# Patient Record
Sex: FEMALE | Race: WHITE | NOT HISPANIC OR LATINO | Employment: OTHER | URBAN - NONMETROPOLITAN AREA
[De-identification: names, ages, dates, MRNs, and addresses within clinical notes are randomized per-mention and may not be internally consistent; named-entity substitution may affect disease eponyms.]

---

## 2017-01-03 ENCOUNTER — OFFICE VISIT (OUTPATIENT)
Dept: UROLOGY | Facility: CLINIC | Age: 57
End: 2017-01-03

## 2017-01-03 ENCOUNTER — OUTSIDE FACILITY SERVICE (OUTPATIENT)
Dept: UROLOGY | Facility: CLINIC | Age: 57
End: 2017-01-03

## 2017-01-03 VITALS
WEIGHT: 250 LBS | HEIGHT: 70 IN | HEART RATE: 77 BPM | DIASTOLIC BLOOD PRESSURE: 73 MMHG | BODY MASS INDEX: 35.79 KG/M2 | SYSTOLIC BLOOD PRESSURE: 145 MMHG | RESPIRATION RATE: 16 BRPM | OXYGEN SATURATION: 97 %

## 2017-01-03 DIAGNOSIS — N20.1 URETERAL CALCULI: Primary | ICD-10-CM

## 2017-01-03 PROCEDURE — S0260 H&P FOR SURGERY: HCPCS | Performed by: UROLOGY

## 2017-01-03 PROCEDURE — 99204 OFFICE O/P NEW MOD 45 MIN: CPT | Performed by: UROLOGY

## 2017-01-03 NOTE — PROGRESS NOTES
Chief Complaint  Nephrolithiasis (8mm left ureteral stone.)      HPI  Katelyn Wilson is a 56 y.o.female who is referred by the emergency room for evaluation of an 8 mm calculus in her distal left ureter. She's been having intermittent pain for a week and was previously seen at an emergency room in [place].  She has now formed 3 stones since she started taking Topamax. This the first one that she has not been able to pass spontaneously.  In the office today she is vomiting and is in uncontrollable pain so I suggested admitting her for ureteroscopy and laser lithotripsy in the a.m.    Vitals:    01/03/17 1511   BP: 145/73   Pulse: 77   Resp: 16   SpO2: 97%       Past Medical History  Past Medical History   Diagnosis Date   • Ataxia    • Calculus of distal right ureter    • Chest pain      [facility] feb 2003-near normal coronaries, Cardiolite 2007-Exercised for 7 minutes and 22 seconds-no evidence of ischemia, LVEF 84%, ER presentation on 3/17/16 Dr. 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    • Hyperlipidemia    • Hypertension    • Muscle weakness (generalized)    • Neck pain    • Obesity    • Osteoarthritis    • Stroke      MRI of brain 2001 revealing Microvascular changes and evidence of 3mm, left basal fanglia lacunar infarct with initiation of Plavix   • Syncope      neg Tilt Table 11/09/2011   • Tendonitis    • Ureteral stone        Past Surgical History  Past Surgical History   Procedure Laterality Date   • Appendectomy     • Cholecystectomy     • Hysterectomy     • Rotator cuff repair Left        Medications  has a current medication list which includes the following prescription(s): albuterol, amitriptyline, aspirin, clopidogrel,  cyanocobalamin, ezetimibe, gabapentin, isosorbide mononitrate, meloxicam, metoprolol tartrate, montelukast, nitroglycerin, salsalate, and topiramate.    Allergies  Allergies   Allergen Reactions   • Atorvastatin    • Codeine Other (See Comments)     Tachycardia   • Diltiazem Other (See Comments)     Headache   • Estrogens    • Oxycodone    • Propoxyphene    • Diovan [Valsartan] Rash       Social History  Social History     Social History   • Marital status:      Spouse name: N/A   • Number of children: N/A   • Years of education: N/A     Occupational History   • Not on file.     Social History Main Topics   • Smoking status: Former Smoker     Types: Cigarettes     Quit date: 1992   • Smokeless tobacco: Never Used   • Alcohol use No   • Drug use: No   • Sexual activity: Defer     Other Topics Concern   • Not on file     Social History Narrative       Family History  Family History   Problem Relation Age of Onset   • Colon cancer Mother    • Diabetes Mother    • Hypertension Mother    • Colon cancer Father    • Hypertension Father    • Prostate cancer Father    • Diabetes Maternal Grandmother    • Hypertension Maternal Grandmother    • Diabetes Maternal Grandfather    • Prostate cancer Maternal Grandfather    • Hypertension Paternal Grandmother    • Lung cancer Paternal Grandfather    • Prostate cancer Paternal Grandfather        Review of Systems  Review of Systems  Positive for fatigue and weight gain itching chronic headaches-nosebleeds diarrhea hematuria kidney stones back pain joint pain dizziness depression easy bruising  Physical Exam  Physical Exam   Constitutional: She is oriented to person, place, and time. She appears well-developed and well-nourished.   HENT:   Head: Normocephalic and atraumatic.   Neck: Normal range of motion.   Cardiovascular: Normal rate, regular rhythm, normal heart sounds and intact distal pulses.    Pulmonary/Chest: Effort normal. No respiratory distress. She has no wheezes.    Abdominal: Soft. She exhibits no distension and no mass. There is no tenderness. No hernia.   Musculoskeletal: Normal range of motion.   Lymphadenopathy:     She has no cervical adenopathy.   Neurological: She is alert and oriented to person, place, and time.   Skin: Skin is warm and dry.   Psychiatric: She has a normal mood and affect. Her behavior is normal.   Vitals reviewed.      Labs recent and today in the office:  Results for orders placed or performed during the hospital encounter of 01/03/17   Urinalysis With / Culture If Indicated   Result Value Ref Range    Color, UA Yellow     Appearance, UA Slightly Cloudy     Glucose, UA Negative NEGATIVE    Bilirubin, UA Negative NEGATIVE    Ketones, UA Negative NEGATIVE    Specific Gravity, UA 1.020 1.003 - 1.035    Blood, UA Moderate NEGATIVE    pH, UA 7.0 5.0 - 8.0    Protein, UA Negative NEGATIVE    Urobilinogen, UA 0.2 0.2    Nitrite, UA Negative NEGATIVE    Leukocytes, UA Negative NEGATIVE    WBC, UA 0-3 0 - 3    RBC, UA 10-20 0 - 3    Epithelial Cells, UA 4-10 0 - 3    Bacteria, UA TRACE NONE SEEN    Amorphous Crystals, UA TRACE    Basic Metabolic Panel   Result Value Ref Range    Sodium 141 137 - 145 mmol/L    Potassium 4.0 3.5 - 5.1 mmol/L    Chloride 100 98 - 107 mmol/L    CO2 30 26 - 30 mmol/L    Anion Gap 16 10 - 20 mmol/L    BUN 19 7 - 20 mg/dL    Creatinine 1.1 0.6 - 1.3 mg/dL    eGFR 51 mL/min    Glucose 227 (H) 74 - 98 mg/dL    Calcium 9.3 8.4 - 10.2 mg/dL   CBC & Differential   Result Value Ref Range    WBC 6.8 4.8 - 10.8 THOUS    RBC 4.89 4.20 - 5.40 m/uL    Hemoglobin 14.9 12.0 - 16.0 g/dL    Hematocrit 43 37 - 47 %    MCV 88.5 81.0 - 99.0 fL    MCH 30.5 27.0 - 31.0 uug    MCHC 34.4 30.0 - 37.0 g/dL    RDW 12.2 11.5 - 14.5 %    Platelets 173 130 - 400 THOUS    Neutrophil Rel % 74.8 37.0 - 80.0 %    Lymphocyte Rel % 13.7 10.0 - 50.0 %    Monocyte Rel % 8.0 0.0 - 12.0 %    Eosinophil Rel % 2.5 0.0 - 7.0 %    Basophil Rel % 0.70 0.00 - 2.50 %     Immature Granulocyte Rel % 0.30 0.00 - 2.50 %    Neutrophils Absolute 5.08 2.00 - 6.90 THOUS    Lymphocytes Absolute 0.93 0.60 - 3.40 THOUS    Monocytes Absolute 0.54 0.00 - 0.90 THOUS    Eosinophils Absolute 0.17 0.00 - 0.70 THOUS    Basophils Absolute 0.05 0.00 - 0.20 THOUS    Abs Imm Gran 0.02 0.00 - 0.60 THOUS         Assessment & Plan  She'll be admitted for parenteral hydration pain control and in the a.m. Undergo ureteroscopy laser lithotripsy and insertion of ureteral stent  Please see the separately dictated history and physical for additional detail.

## 2017-01-03 NOTE — MR AVS SNAPSHOT
Katelynjonelle Wilson   1/3/2017 3:15 PM   Office Visit    Dept Phone:  [phone]   Encounter #:  [id number]    Provider:  Jalil Mac MD   Department:  [facility] UROLOGY                Your Full Care Plan              Your Updated Medication List          This list is accurate as of: 1/3/17  4:01 PM.  Always use your most recent med list.                amitriptyline 10 MG tablet   Commonly known as:  ELAVIL       aspirin 325 MG tablet       cyanocobalamin 1000 MCG/ML injection       gabapentin 600 MG tablet   Commonly known as:  NEURONTIN       isosorbide mononitrate 60 MG 24 hr tablet   Commonly known as:  IMDUR       meloxicam 7.5 MG tablet   Commonly known as:  MOBIC       metoprolol tartrate 25 MG tablet   Commonly known as:  LOPRESSOR       nitroglycerin 0.4 MG SL tablet   Commonly known as:  NITROSTAT       PLAVIX 75 MG tablet   Generic drug:  clopidogrel       PROAIR  (90 BASE) MCG/ACT inhaler   Generic drug:  albuterol       salsalate 500 MG tablet   Commonly known as:  DISALCID       SINGULAIR 10 MG tablet   Generic drug:  montelukast       TOPAMAX 50 MG tablet   Generic drug:  topiramate       ZETIA 10 MG tablet   Generic drug:  ezetimibe               Instructions     None    Patient Instructions History      Upcoming Appointments     Visit Type Date Time Department    NEW PATIENT 1/3/2017  3:15 PM [facility]Y [place]    FOLLOW UP 12/21/2017 10:00 AM [facility] NE[facility]O [facility] [place]      MyChart Signup     Our records indicate that you have declined [facility] sign[facility]. If you would like to sign up for [facility], please email [email or web address] or call [phone] to obtain an activation code.             Other Info from Your Visit           Your Appointments     Dec 21, 2017 10:00 AM EST   Follow Up with Edvin Andrade MD   [facility] NEUROLOGY (--)    [address]  [facility] [address]    "  [phone]           Arrive 15 minutes prior to appointment.              Allergies     Atorvastatin      Codeine  Other (See Comments)    Tachycardia    Diltiazem  Other (See Comments)    Headache    Estrogens      Oxycodone      Propoxyphene      Diovan [Valsartan]  Rash      Reason for Visit     Nephrolithiasis 8mm left ureteral stone.      Vital Signs     Blood Pressure Pulse Respirations Height Weight Oxygen Saturation    145/73 77 16 69.5\" (176.5 cm) 250 lb (113 kg) 97%    Body Mass Index Smoking Status                36.39 kg/m2 Former Smoker            "

## 2017-01-04 ENCOUNTER — OUTSIDE FACILITY SERVICE (OUTPATIENT)
Dept: UROLOGY | Facility: CLINIC | Age: 57
End: 2017-01-04

## 2017-01-04 PROCEDURE — 74420 UROGRAPHY RTRGR +-KUB: CPT | Performed by: UROLOGY

## 2017-01-04 PROCEDURE — 52356 CYSTO/URETERO W/LITHOTRIPSY: CPT | Performed by: UROLOGY

## 2017-01-09 ENCOUNTER — OFFICE VISIT (OUTPATIENT)
Dept: UROLOGY | Facility: CLINIC | Age: 57
End: 2017-01-09

## 2017-01-09 VITALS
TEMPERATURE: 98.5 F | DIASTOLIC BLOOD PRESSURE: 76 MMHG | HEART RATE: 85 BPM | OXYGEN SATURATION: 97 % | RESPIRATION RATE: 18 BRPM | SYSTOLIC BLOOD PRESSURE: 144 MMHG

## 2017-01-09 DIAGNOSIS — Z87.898 NO POST-OP COMPLICATIONS: Primary | ICD-10-CM

## 2017-01-09 LAB
BILIRUB BLD-MCNC: NEGATIVE MG/DL
CLARITY, POC: CLEAR
COLOR UR: YELLOW
GLUCOSE UR STRIP-MCNC: NEGATIVE MG/DL
KETONES UR QL: NEGATIVE
LEUKOCYTE EST, POC: NEGATIVE
NITRITE UR-MCNC: NEGATIVE MG/ML
PH UR: 6 [PH] (ref 5–8)
PROT UR STRIP-MCNC: ABNORMAL MG/DL
RBC # UR STRIP: ABNORMAL /UL
SP GR UR: 1.03 (ref 1–1.03)
UROBILINOGEN UR QL: NORMAL

## 2017-01-09 PROCEDURE — 81003 URINALYSIS AUTO W/O SCOPE: CPT | Performed by: UROLOGY

## 2017-01-09 PROCEDURE — 99213 OFFICE O/P EST LOW 20 MIN: CPT | Performed by: UROLOGY

## 2017-01-09 NOTE — PROGRESS NOTES
Chief Complaint  Post-op (patient is here for post op stent removal.)      HPI  Katelyn Wilson is a 56 y.o.female who returns today after laser lithotripsy of her force ureteral calculus that she is formed since taking Topamax.  Then is easily removed with the protruding urethral suture.    Vitals:    01/09/17 1315   BP: 144/76   Pulse: 85   Resp: 18   Temp: 98.5 °F (36.9 °C)   SpO2: 97%       Past Medical History  Past Medical History   Diagnosis Date   • Ataxia    • Calculus of distal right ureter    • Chest pain      [facility] feb 2003-near normal coronaries, Cardiolite 2007-Exercised for 7 minutes and 22 seconds-no evidence of ischemia, LVEF 84%, ER presentation on 3/17/16 Dr. 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    • Hyperlipidemia    • Hypertension    • Muscle weakness (generalized)    • Neck pain    • Obesity    • Osteoarthritis    • Stroke      MRI of brain 2001 revealing Microvascular changes and evidence of 3mm, left basal fanglia lacunar infarct with initiation of Plavix   • Syncope      neg Tilt Table 11/09/2011   • Tendonitis    • Ureteral stone        Past Surgical History  Past Surgical History   Procedure Laterality Date   • Appendectomy     • Cholecystectomy     • Hysterectomy     • Rotator cuff repair Left        Medications  has a current medication list which includes the following prescription(s): albuterol, amitriptyline, aspirin, clopidogrel, cyanocobalamin, ezetimibe, gabapentin, isosorbide mononitrate, meloxicam, metoprolol tartrate, montelukast, nitroglycerin, salsalate, and topiramate.    Allergies  Allergies   Allergen Reactions   • Atorvastatin    • Codeine Other (See Comments)     Tachycardia   • Diltiazem Other  (See Comments)     Headache   • Estrogens    • Oxycodone    • Propoxyphene    • Diovan [Valsartan] Rash       Social History  Social History     Social History   • Marital status:      Spouse name: N/A   • Number of children: N/A   • Years of education: N/A     Occupational History   • Not on file.     Social History Main Topics   • Smoking status: Former Smoker     Types: Cigarettes     Quit date: 1992   • Smokeless tobacco: Never Used   • Alcohol use No   • Drug use: No   • Sexual activity: Defer     Other Topics Concern   • Not on file     Social History Narrative       Family History  Family History   Problem Relation Age of Onset   • Colon cancer Mother    • Diabetes Mother    • Hypertension Mother    • Colon cancer Father    • Hypertension Father    • Prostate cancer Father    • Diabetes Maternal Grandmother    • Hypertension Maternal Grandmother    • Diabetes Maternal Grandfather    • Prostate cancer Maternal Grandfather    • Hypertension Paternal Grandmother    • Lung cancer Paternal Grandfather    • Prostate cancer Paternal Grandfather        Review of Systems  Review of Systems   Constitutional: Negative.    Genitourinary: Negative.    All other systems reviewed and are negative.      Physical Exam  Physical Exam    Labs recent and today in the office:  Results for orders placed or performed in visit on 01/09/17   POC Urinalysis Dipstick, Automated   Result Value Ref Range    Color Yellow Yellow, Straw, Dark Yellow, Marissa    Clarity, UA Clear Clear    Glucose, UA Negative Negative, 1000 mg/dL (3+) mg/dL    Bilirubin Negative Negative    Ketones, UA Negative Negative    Specific Gravity  1.030 1.005 - 1.030    Blood, UA 3+ (A) Negative    pH, Urine 6.0 5.0 - 8.0    Protein, POC 2+ (A) Negative mg/dL    Urobilinogen, UA Normal Normal    Leukocytes Negative Negative    Nitrite, UA Negative Negative         Assessment & Plan  She is informed of the dietary changes she needs to make to reduce her risk of  additional kidney stones and informed about the possibility of a metabolic formation.  She will check back with her neurologist to see if there is an alternative to the Topamax for migraines.

## 2017-01-09 NOTE — MR AVS SNAPSHOT
Katelyn Wilson   1/9/2017 1:00 PM   Office Visit    Dept Phone:  [phone]   Encounter #:  [id number]    Provider:  Jalil Mac MD   Department:  [facility] UROLOGY                Your Full Care Plan              Your Updated Medication List          This list is accurate as of: 1/9/17  1:34 PM.  Always use your most recent med list.                amitriptyline 10 MG tablet   Commonly known as:  ELAVIL       aspirin 325 MG tablet       cyanocobalamin 1000 MCG/ML injection       gabapentin 600 MG tablet   Commonly known as:  NEURONTIN       isosorbide mononitrate 60 MG 24 hr tablet   Commonly known as:  IMDUR       meloxicam 7.5 MG tablet   Commonly known as:  MOBIC       metoprolol tartrate 25 MG tablet   Commonly known as:  LOPRESSOR       nitroglycerin 0.4 MG SL tablet   Commonly known as:  NITROSTAT       PLAVIX 75 MG tablet   Generic drug:  clopidogrel       PROAIR  (90 BASE) MCG/ACT inhaler   Generic drug:  albuterol       salsalate 500 MG tablet   Commonly known as:  DISALCID       SINGULAIR 10 MG tablet   Generic drug:  montelukast       TOPAMAX 50 MG tablet   Generic drug:  topiramate       ZETIA 10 MG tablet   Generic drug:  ezetimibe               We Performed the Following     POC Urinalysis Dipstick, Automated       You Were Diagnosed With        Codes Comments    No post-op complications    -  Primary ICD-10-CM: Z87.898  ICD-9-CM: V49.9       Instructions     None    Patient Instructions History      Upcoming Appointments     Visit Type Date Time Department    FOLLOW UP 1/9/2017  1:00 PM [facility] UROLOGY [place]    FOLLOW UP 2/22/2017  3:15 PM [facility] UROLOGY [place]    FOLLOW UP 12/21/2017 10:00 AM [facility] NEURO CONSULTS [facility]      WISE[employer] Signup     Our records indicate that you have declined [facility] signup. If you would like to sign up for [employer], please email [employer] [email or web address] or call [phone] to obtain an activation  code.             Other Info from Your Visit           Your Appointments     Feb 22, 2017  3:15 PM EST   Follow Up with Jalil Mac MD   [facility] UROLOGY (--)    [address]  [facility] [id number] [phone]           Arrive 15 minutes prior to appointment.            Dec 21, 2017 10:00 AM EST   Follow Up with Edvin Andrade MD   [facility] NEUROLOGY (--)    [address]  [facility] [address] [phone]           Arrive 15 minutes prior to appointment.              Allergies     Atorvastatin      Codeine  Other (See Comments)    Tachycardia    Diltiazem  Other (See Comments)    Headache    Estrogens      Oxycodone      Propoxyphene      Diovan [Valsartan]  Rash      Reason for Visit     Post-op patient is here for post op stent removal.      Vital Signs     Blood Pressure Pulse Temperature Respirations Oxygen Saturation Smoking Status    144/76 85 98.5 °F (36.9 °C) (Temporal Artery ) 18 97% Former Smoker      Problems and Diagnoses Noted     No post-op complications    -  Primary      Results     POC Urinalysis Dipstick, Automated      Component Value Standard Range & Units    Color Yellow Yellow, Straw, Dark Yellow, Marissa    Clarity, UA Clear Clear    Glucose, UA Negative Negative, 1000 mg/dL (3+) mg/dL    Bilirubin Negative Negative    Ketones, UA Negative Negative    Specific Gravity  1.030 1.005 - 1.030    Blood, UA 3+ Negative    pH, Urine 6.0 5.0 - 8.0    Protein, POC 2+ Negative mg/dL    Urobilinogen, UA Normal Normal    Leukocytes Negative Negative    Nitrite, UA Negative Negative

## 2017-03-29 ENCOUNTER — OFFICE VISIT (OUTPATIENT)
Dept: UROLOGY | Facility: CLINIC | Age: 57
End: 2017-03-29

## 2017-03-29 VITALS — WEIGHT: 250 LBS | HEIGHT: 70 IN | BODY MASS INDEX: 35.79 KG/M2

## 2017-03-29 DIAGNOSIS — Z87.442 PERSONAL HISTORY OF KIDNEY STONES: Primary | ICD-10-CM

## 2017-03-29 LAB
BILIRUB BLD-MCNC: NEGATIVE MG/DL
CLARITY, POC: CLEAR
COLOR UR: YELLOW
GLUCOSE UR STRIP-MCNC: NEGATIVE MG/DL
KETONES UR QL: NEGATIVE
LEUKOCYTE EST, POC: NEGATIVE
NITRITE UR-MCNC: NEGATIVE MG/ML
PH UR: 6.5 [PH] (ref 5–8)
PROT UR STRIP-MCNC: ABNORMAL MG/DL
RBC # UR STRIP: NEGATIVE /UL
SP GR UR: 1.01 (ref 1–1.03)
UROBILINOGEN UR QL: NORMAL

## 2017-03-29 PROCEDURE — 99213 OFFICE O/P EST LOW 20 MIN: CPT | Performed by: UROLOGY

## 2017-03-29 PROCEDURE — 81003 URINALYSIS AUTO W/O SCOPE: CPT | Performed by: UROLOGY

## 2017-03-29 NOTE — PROGRESS NOTES
Chief Complaint  Nephrolithiasis (6 week fup.)      KAMILA Wilson is a 56 y.o.female who returns today for follow-up 6 weeks after she required ureteroscopy laser lithotripsy for a large ureteral calculus.  She had never had stones until she was started on Topamax.  She has an appointment to see her neurologist next week to consider alternative medications.  Currently she is asymptomatic with no signs of stones in her urine is clear negative even for blood.    There were no vitals filed for this visit.    Past Medical History  Past Medical History:   Diagnosis Date   • Ataxia    • Calculus of distal right ureter    • Chest pain     [facility] feb 2003-near normal coronaries, Cardiolite 2007-Exercised for 7 minutes and 22 seconds-no evidence of ischemia, LVEF 84%, ER presentation on 3/17/16 Dr. 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    • Hyperlipidemia    • Hypertension    • Muscle weakness (generalized)    • Neck pain    • Obesity    • Osteoarthritis    • Stroke     MRI of brain 2001 revealing Microvascular changes and evidence of 3mm, left basal fanglia lacunar infarct with initiation of Plavix   • Syncope     neg Tilt Table 11/09/2011   • Tendonitis    • Ureteral stone        Past Surgical History  Past Surgical History:   Procedure Laterality Date   • APPENDECTOMY     • CHOLECYSTECTOMY     • HYSTERECTOMY     • ROTATOR CUFF REPAIR Left        Medications  has a current medication list which includes the following prescription(s): albuterol, amitriptyline, aspirin, clopidogrel, cyanocobalamin, ezetimibe, gabapentin, isosorbide mononitrate, meloxicam, metoprolol tartrate, montelukast, nitroglycerin, salsalate, and  topiramate.    Allergies  Allergies   Allergen Reactions   • Atorvastatin    • Codeine Other (See Comments)     Tachycardia   • Diltiazem Other (See Comments)     Headache   • Estrogens    • Oxycodone    • Propoxyphene    • Diovan [Valsartan] Rash       Social History  Social History     Social History   • Marital status:      Spouse name: N/A   • Number of children: N/A   • Years of education: N/A     Occupational History   • Not on file.     Social History Main Topics   • Smoking status: Former Smoker     Types: Cigarettes     Quit date: 1992   • Smokeless tobacco: Never Used   • Alcohol use No   • Drug use: No   • Sexual activity: Defer     Other Topics Concern   • Not on file     Social History Narrative       Family History  Family History   Problem Relation Age of Onset   • Colon cancer Mother    • Diabetes Mother    • Hypertension Mother    • Colon cancer Father    • Hypertension Father    • Prostate cancer Father    • Diabetes Maternal Grandmother    • Hypertension Maternal Grandmother    • Diabetes Maternal Grandfather    • Prostate cancer Maternal Grandfather    • Hypertension Paternal Grandmother    • Lung cancer Paternal Grandfather    • Prostate cancer Paternal Grandfather        Review of Systems  Review of Systems   Constitutional: Negative.    Genitourinary: Negative.    All other systems reviewed and are negative.      Physical Exam  Physical Exam    Labs recent and today in the office:  Results for orders placed or performed in visit on 03/29/17   POC Urinalysis Dipstick, Automated   Result Value Ref Range    Color Yellow Yellow, Straw, Dark Yellow, Marissa    Clarity, UA Clear Clear    Glucose, UA Negative Negative, 1000 mg/dL (3+) mg/dL    Bilirubin Negative Negative    Ketones, UA Negative Negative    Specific Gravity  1.015 1.005 - 1.030    Blood, UA Negative Negative    pH, Urine 6.5 5.0 - 8.0    Protein, POC Trace (A) Negative mg/dL    Urobilinogen, UA Normal Normal    Leukocytes  Negative Negative    Nitrite, UA Negative Negative         Assessment & Plan  I suggested a heart healthy diet with plenty of water to reduce her risk of getting kidney stones.  If she has additional formation I would recommend a metabolic evaluation.

## 2017-05-04 ENCOUNTER — OFFICE VISIT (OUTPATIENT)
Dept: NEUROLOGY | Facility: CLINIC | Age: 57
End: 2017-05-04

## 2017-05-04 VITALS
WEIGHT: 255 LBS | DIASTOLIC BLOOD PRESSURE: 78 MMHG | HEIGHT: 70 IN | BODY MASS INDEX: 36.51 KG/M2 | OXYGEN SATURATION: 97 % | SYSTOLIC BLOOD PRESSURE: 110 MMHG | HEART RATE: 81 BPM

## 2017-05-04 DIAGNOSIS — G56.03 BILATERAL CARPAL TUNNEL SYNDROME: Primary | ICD-10-CM

## 2017-05-04 DIAGNOSIS — M47.812 SPONDYLOSIS OF CERVICAL REGION WITHOUT MYELOPATHY OR RADICULOPATHY: ICD-10-CM

## 2017-05-04 DIAGNOSIS — G43.009 MIGRAINE WITHOUT AURA AND WITHOUT STATUS MIGRAINOSUS, NOT INTRACTABLE: ICD-10-CM

## 2017-05-04 DIAGNOSIS — M54.2 NECK PAIN: ICD-10-CM

## 2017-05-04 PROCEDURE — 99214 OFFICE O/P EST MOD 30 MIN: CPT | Performed by: PSYCHIATRY & NEUROLOGY

## 2017-05-04 RX ORDER — AMITRIPTYLINE HYDROCHLORIDE 50 MG/1
50 TABLET, FILM COATED ORAL NIGHTLY
Qty: 30 TABLET | Refills: 11 | Status: SHIPPED | OUTPATIENT
Start: 2017-05-04 | End: 2018-09-26

## 2017-08-25 ENCOUNTER — TRANSCRIBE ORDERS (OUTPATIENT)
Dept: ADMINISTRATIVE | Facility: HOSPITAL | Age: 57
End: 2017-08-25

## 2017-08-25 DIAGNOSIS — R51.9 HEADACHE, UNSPECIFIED HEADACHE TYPE: Primary | ICD-10-CM

## 2017-09-01 ENCOUNTER — TRANSCRIBE ORDERS (OUTPATIENT)
Dept: CT IMAGING | Facility: HOSPITAL | Age: 57
End: 2017-09-01

## 2017-09-01 ENCOUNTER — HOSPITAL ENCOUNTER (OUTPATIENT)
Dept: CT IMAGING | Facility: HOSPITAL | Age: 57
Discharge: HOME | End: 2017-09-01
Admitting: NURSE PRACTITIONER

## 2017-09-01 DIAGNOSIS — R51.9 HEADACHE, UNSPECIFIED HEADACHE TYPE: ICD-10-CM

## 2017-09-01 PROCEDURE — 70450 CT HEAD/BRAIN W/O DYE: CPT

## 2017-09-09 ENCOUNTER — APPOINTMENT (OUTPATIENT)
Dept: GENERAL RADIOLOGY | Facility: HOSPITAL | Age: 57
End: 2017-09-09

## 2017-09-09 ENCOUNTER — HOSPITAL ENCOUNTER (EMERGENCY)
Facility: HOSPITAL | Age: 57
Discharge: HOME | End: 2017-09-09
Attending: STUDENT IN AN ORGANIZED HEALTH CARE EDUCATION/TRAINING PROGRAM | Admitting: EMERGENCY MEDICINE

## 2017-09-09 ENCOUNTER — APPOINTMENT (OUTPATIENT)
Dept: CT IMAGING | Facility: HOSPITAL | Age: 57
End: 2017-09-09

## 2017-09-09 VITALS
BODY MASS INDEX: 35.79 KG/M2 | TEMPERATURE: 97.9 F | HEART RATE: 83 BPM | DIASTOLIC BLOOD PRESSURE: 88 MMHG | HEIGHT: 70 IN | OXYGEN SATURATION: 95 % | WEIGHT: 250 LBS | SYSTOLIC BLOOD PRESSURE: 120 MMHG | RESPIRATION RATE: 20 BRPM

## 2017-09-09 DIAGNOSIS — S80.01XA CONTUSION OF RIGHT KNEE, INITIAL ENCOUNTER: Primary | ICD-10-CM

## 2017-09-09 DIAGNOSIS — S40.012A CONTUSION OF LEFT SHOULDER, INITIAL ENCOUNTER: ICD-10-CM

## 2017-09-09 PROCEDURE — 73030 X-RAY EXAM OF SHOULDER: CPT

## 2017-09-09 PROCEDURE — 99283 EMERGENCY DEPT VISIT LOW MDM: CPT

## 2017-09-09 PROCEDURE — 70450 CT HEAD/BRAIN W/O DYE: CPT

## 2017-09-09 PROCEDURE — 73562 X-RAY EXAM OF KNEE 3: CPT

## 2017-09-09 RX ORDER — HYDROCODONE BITARTRATE AND ACETAMINOPHEN 5; 325 MG/1; MG/1
1 TABLET ORAL EVERY 6 HOURS PRN
Qty: 10 TABLET | Refills: 0 | Status: SHIPPED | OUTPATIENT
Start: 2017-09-09 | End: 2018-09-26

## 2017-09-09 RX ORDER — HYDROCODONE BITARTRATE AND ACETAMINOPHEN 5; 325 MG/1; MG/1
1 TABLET ORAL ONCE
Status: COMPLETED | OUTPATIENT
Start: 2017-09-09 | End: 2017-09-09

## 2017-09-09 RX ORDER — MOMETASONE FUROATE 50 UG/1
2 SPRAY, METERED NASAL DAILY
COMMUNITY
End: 2018-11-09

## 2017-09-09 RX ORDER — ESOMEPRAZOLE MAGNESIUM 40 MG/1
40 CAPSULE, DELAYED RELEASE ORAL
COMMUNITY
End: 2018-11-09

## 2017-09-09 RX ORDER — DULOXETIN HYDROCHLORIDE 60 MG/1
60 CAPSULE, DELAYED RELEASE ORAL DAILY
COMMUNITY

## 2017-09-09 RX ORDER — LISINOPRIL 10 MG/1
10 TABLET ORAL DAILY
COMMUNITY

## 2017-09-09 RX ORDER — DESLORATADINE 5 MG/1
5 TABLET ORAL DAILY
COMMUNITY

## 2017-09-09 RX ORDER — AMITRIPTYLINE HYDROCHLORIDE 10 MG/1
10 TABLET, FILM COATED ORAL 3 TIMES DAILY
COMMUNITY
End: 2018-09-26

## 2017-09-09 RX ADMIN — HYDROCODONE BITARTRATE AND ACETAMINOPHEN 1 TABLET: 5; 325 TABLET ORAL at 23:14

## 2017-09-10 NOTE — DISCHARGE INSTRUCTIONS
Patient should use warm compresses, ibuprofen.  Patient will be given small amount of Norco for the next 48 hours.  Patient should follow with primary care return for worsening symptoms.

## 2017-09-10 NOTE — ED PROVIDER NOTES
"Subjective   HPI Comments: Patient is 57-year-old female who is here today complaining of pain of the right knee.  Patient states that she fell down some stairs approximately one week prior and has had slowly worsening pain she describes as \"killing her\" of her right knee.  Patient states pain is significant that ambulation is difficult.  Stabbing shooting.  Patient also complains of continued left shoulder pain worse with movement, posterior scalp pain worse with light touch.  Patient was seen by her primary care doctor this past Wednesday.  She denied any loss of consciousness and was not seen at the hospital.  Patient states she has had previous history of injury to the right knee      History provided by:  Patient      Review of Systems   Musculoskeletal:        Right knee pain, left shoulder pain   Skin:        Ecchymosis left shoulder   All other systems reviewed and are negative.      Past Medical History:   Diagnosis Date   • Ataxia    • Calculus of distal right ureter    • Chest pain     [facility] feb 2003-near normal coronaries, Cardiolite 2007-Exercised for 7 minutes and 22 seconds-no evidence of ischemia, LVEF 84%, ER presentation on 3/17/16 Dr. 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    • Hyperlipidemia    • Hypertension    • Muscle weakness (generalized)    • Neck pain    • Obesity    • Osteoarthritis    • Stroke     MRI of brain 2001 revealing Microvascular changes and evidence of 3mm, left basal fanglia lacunar infarct with initiation of Plavix   • Syncope     neg Tilt Table 11/09/2011   • Tendonitis    • Ureteral stone        Allergies   Allergen Reactions   • Atorvastatin    • Codeine Other (See Comments)     Tachycardia   • " Diltiazem Other (See Comments)     Headache   • Estrogens    • Oxycodone    • Propoxyphene    • Diovan [Valsartan] Rash       Past Surgical History:   Procedure Laterality Date   • APPENDECTOMY     • CHOLECYSTECTOMY     • HYSTERECTOMY     • ROTATOR CUFF REPAIR Left        Family History   Problem Relation Age of Onset   • Colon cancer Mother    • Diabetes Mother    • Hypertension Mother    • Colon cancer Father    • Hypertension Father    • Prostate cancer Father    • Diabetes Maternal Grandmother    • Hypertension Maternal Grandmother    • Diabetes Maternal Grandfather    • Prostate cancer Maternal Grandfather    • Hypertension Paternal Grandmother    • Lung cancer Paternal Grandfather    • Prostate cancer Paternal Grandfather        Social History     Social History   • Marital status:      Spouse name: N/A   • Number of children: N/A   • Years of education: N/A     Social History Main Topics   • Smoking status: Former Smoker     Types: Cigarettes     Quit date: 1992   • Smokeless tobacco: Never Used   • Alcohol use No   • Drug use: No   • Sexual activity: Defer     Other Topics Concern   • None     Social History Narrative           Objective   Physical Exam   Constitutional: She is oriented to person, place, and time. She appears well-developed and well-nourished.   HENT:   Head: Normocephalic.   There is tenderness in the left posterior ear scalp, there is no ecchymosis, abrasions or redness.  There is mild soft tissue swelling in the area of tenderness.   Eyes: EOM are normal. Pupils are equal, round, and reactive to light.   Neck: Normal range of motion.   Cardiovascular: Normal rate and regular rhythm.    Pulmonary/Chest: Effort normal.   Musculoskeletal: Normal range of motion.   There is tenderness to palpation of the shoulder, right knee   Neurological: She is alert and oriented to person, place, and time.   Skin: Skin is warm and dry.   Ecchymosis of the left posterior shoulder, ecchymosis of the  right knee   Psychiatric: She has a normal mood and affect. Her behavior is normal. Thought content normal.       Procedures         ED Course  ED Course        X-rays were negative for abnormality, CT head was normal-I was specifically looking for small undiagnosed skull fracture.  I will give patient a small amount of Norco for her pain over the next 48 hours.  Patient should follow with primary care.  Possible referral to orthopedics for continued right knee pain.          MDM    Final diagnoses:   Contusion of right knee, initial encounter   Contusion of left shoulder, initial encounter            Jenelle Ye PA-C  09/09/17 [id number]

## 2017-09-13 ENCOUNTER — OFFICE VISIT (OUTPATIENT)
Dept: NEUROLOGY | Facility: CLINIC | Age: 57
End: 2017-09-13

## 2017-09-13 VITALS
HEIGHT: 69 IN | DIASTOLIC BLOOD PRESSURE: 97 MMHG | SYSTOLIC BLOOD PRESSURE: 137 MMHG | HEART RATE: 92 BPM | WEIGHT: 255 LBS | OXYGEN SATURATION: 97 % | BODY MASS INDEX: 37.77 KG/M2

## 2017-09-13 DIAGNOSIS — G43.009 MIGRAINE WITHOUT AURA AND WITHOUT STATUS MIGRAINOSUS, NOT INTRACTABLE: ICD-10-CM

## 2017-09-13 DIAGNOSIS — S09.90XA HEAD INJURY, INITIAL ENCOUNTER: ICD-10-CM

## 2017-09-13 DIAGNOSIS — G56.03 BILATERAL CARPAL TUNNEL SYNDROME: ICD-10-CM

## 2017-09-13 DIAGNOSIS — R42 DIZZINESS: Primary | ICD-10-CM

## 2017-09-13 DIAGNOSIS — M54.2 NECK PAIN: ICD-10-CM

## 2017-09-13 PROCEDURE — 99214 OFFICE O/P EST MOD 30 MIN: CPT | Performed by: PSYCHIATRY & NEUROLOGY

## 2017-10-06 ENCOUNTER — TELEPHONE (OUTPATIENT)
Dept: CARDIOLOGY | Facility: CLINIC | Age: 57
End: 2017-10-06

## 2017-10-06 NOTE — TELEPHONE ENCOUNTER
PT scheduled for right knee replacement in January or march with Dr. Deirdre Fontaine - she had a cath in 3/2016- near normal coronaries- she has a ascending aorta that measures 3.4x3.8 anis due to repeat CT whens he comes back in January 2018-  Last CT was March 2016    Ok to proceed? Should we repeat the CT first? She has no complaints at this time-

## 2018-04-12 ENCOUNTER — OFFICE VISIT (OUTPATIENT)
Dept: NEUROLOGY | Facility: CLINIC | Age: 58
End: 2018-04-12

## 2018-04-12 VITALS
BODY MASS INDEX: 34.66 KG/M2 | DIASTOLIC BLOOD PRESSURE: 85 MMHG | WEIGHT: 234 LBS | SYSTOLIC BLOOD PRESSURE: 119 MMHG | HEART RATE: 81 BPM | HEIGHT: 69 IN

## 2018-04-12 DIAGNOSIS — G56.03 BILATERAL CARPAL TUNNEL SYNDROME: ICD-10-CM

## 2018-04-12 DIAGNOSIS — R26.89 POOR BALANCE: ICD-10-CM

## 2018-04-12 DIAGNOSIS — G43.009 MIGRAINE WITHOUT AURA AND WITHOUT STATUS MIGRAINOSUS, NOT INTRACTABLE: Primary | ICD-10-CM

## 2018-04-12 DIAGNOSIS — M54.2 NECK PAIN: ICD-10-CM

## 2018-04-12 PROCEDURE — 99214 OFFICE O/P EST MOD 30 MIN: CPT | Performed by: PSYCHIATRY & NEUROLOGY

## 2018-04-12 RX ORDER — ONDANSETRON 8 MG/1
TABLET, ORALLY DISINTEGRATING ORAL
Refills: 0 | COMMUNITY
Start: 2018-03-14 | End: 2018-11-09

## 2018-04-12 RX ORDER — FEXOFENADINE HYDROCHLORIDE 180 MG/1
TABLET, FILM COATED ORAL
Refills: 0 | COMMUNITY
Start: 2018-02-09 | End: 2018-11-09

## 2018-04-12 RX ORDER — CYCLOBENZAPRINE HCL 10 MG
TABLET ORAL
Refills: 0 | COMMUNITY
Start: 2018-03-28 | End: 2018-09-26

## 2018-04-12 NOTE — PROGRESS NOTES
Subjective:     Patient ID: Katelyn Wilson is a 57 y.o. female.    CC:   Chief Complaint   Patient presents with   • Carpal Tunnel       HPI:   History of Present Illness  The following portions of the patient's history were reviewed and updated as appropriate: allergies, current medications, past family history, past medical history, past social history, past surgical history and problem list.     Reports increase in headaches, neck pain, wrist pain, poor balance. She has had another fall with possible head and neck injury. She has benefited from neck injections and wrist injections in the past.    Past Medical History:   Diagnosis Date   • Ataxia    • Calculus of distal right ureter    • Chest pain     [facility] feb 2003-near normal coronaries, Cardiolite 2007-Exercised for 7 minutes and 22 seconds-no evidence of ischemia, LVEF 84%, ER presentation on 3/17/16 Dr. 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    • Hyperlipidemia    • Hypertension    • Muscle weakness (generalized)    • Neck pain    • Obesity    • Osteoarthritis    • Stroke     MRI of brain 2001 revealing Microvascular changes and evidence of 3mm, left basal fanglia lacunar infarct with initiation of Plavix   • Syncope     neg Tilt Table 11/09/2011   • Tendonitis    • Ureteral stone        Past Surgical History:   Procedure Laterality Date   • APPENDECTOMY     • CHOLECYSTECTOMY     • HYSTERECTOMY     • ROTATOR CUFF REPAIR Left        Social History     Social History   • Marital status:      Spouse name: N/A   • Number of children: N/A   • Years of education: N/A     Occupational History   • Not on file.     Social History Main Topics   • Smoking status: Former Smoker      Types: Cigarettes     Quit date: 1992   • Smokeless tobacco: Never Used   • Alcohol use No   • Drug use: No   • Sexual activity: Defer     Other Topics Concern   • Not on file     Social History Narrative   • No narrative on file       Family History   Problem Relation Age of Onset   • Colon cancer Mother    • Diabetes Mother    • Hypertension Mother    • Colon cancer Father    • Hypertension Father    • Prostate cancer Father    • Diabetes Maternal Grandmother    • Hypertension Maternal Grandmother    • Diabetes Maternal Grandfather    • Prostate cancer Maternal Grandfather    • Hypertension Paternal Grandmother    • Lung cancer Paternal Grandfather    • Prostate cancer Paternal Grandfather         Review of Systems   Constitutional: Positive for appetite change and fatigue. Negative for chills, fever and unexpected weight change.   HENT: Positive for hearing loss and nosebleeds. Negative for ear pain, rhinorrhea and sore throat.    Eyes: Positive for visual disturbance. Negative for photophobia, pain, discharge and itching.   Respiratory: Positive for shortness of breath. Negative for cough, chest tightness and wheezing.    Cardiovascular: Negative for chest pain, palpitations and leg swelling.   Gastrointestinal: Positive for nausea. Negative for abdominal pain, blood in stool, constipation, diarrhea and vomiting.   Genitourinary: Negative for dysuria, frequency, hematuria and urgency.   Musculoskeletal: Positive for back pain, neck pain and neck stiffness. Negative for arthralgias, gait problem, joint swelling and myalgias.   Skin: Negative for rash and wound.   Allergic/Immunologic: Negative for environmental allergies and food allergies.   Neurological: Positive for dizziness, light-headedness and headaches. Negative for tremors, seizures, syncope, speech difficulty, weakness and numbness.   Hematological: Negative for adenopathy. Bruises/bleeds easily.   Psychiatric/Behavioral: Positive for dysphoric mood.  Negative for agitation, confusion, decreased concentration, hallucinations, sleep disturbance and suicidal ideas. The patient is not nervous/anxious.         Objective:    Neurologic Exam     Mental Status   Oriented to person, place, and time.       Physical Exam   Constitutional: She is oriented to person, place, and time. She appears well-developed and well-nourished.   Cardiovascular: Normal rate and regular rhythm.    Pulmonary/Chest: Effort normal.   Musculoskeletal:   Decreased ROM cspine   Neurological: She is alert and oriented to person, place, and time. She has normal reflexes.   Gait somewhat wide based, cautious   Psychiatric: She has a normal mood and affect. Her behavior is normal. Thought content normal.       Assessment/Plan:       Katelyn was seen today for carpal tunnel.    Diagnoses and all orders for this visit:    Migraine without aura and without status migrainosus, not intractable  -     MRI Brain Without Contrast    Neck pain  -     Ambulatory Referral to Physical Therapy  -     MRI Cervical Spine Without Contrast    Bilateral carpal tunnel syndrome    Poor balance  -     Ambulatory Referral to Physical Therapy  -     MRI Brain Without Contrast  -     MRI Cervical Spine Without Contrast         MRI of brain and cspine are requested upon consideration of brain base mass and cervical spinal stenosis.    Edvin Andrade MD  4/12/2018

## 2018-04-16 ENCOUNTER — TELEPHONE (OUTPATIENT)
Dept: NEUROLOGY | Facility: CLINIC | Age: 58
End: 2018-04-16

## 2018-04-16 NOTE — TELEPHONE ENCOUNTER
----- Message from Rolanda Maldonado sent at 4/13/2018  3:05 PM EDT -----  Regarding: DR. SARA GRACIA [facility] CONTACTED PATIENT ABOUT THERAPY, AND SHE DECLINED, AND IS GOING TO A FACILITY NEARER TO HER.

## 2018-05-10 ENCOUNTER — TELEPHONE (OUTPATIENT)
Dept: NEUROLOGY | Facility: CLINIC | Age: 58
End: 2018-05-10

## 2018-05-10 NOTE — TELEPHONE ENCOUNTER
----- Message from Kareen Walsh sent at 5/9/2018 11:20 AM EDT -----  Regarding: PAPERWORK   Contact: [phone]  PT CALLED ABOUT PAPERWORK,WANTING TO KNOW IF IT WAS COMPLETED...PLEASE CALL HER BACK

## 2018-05-24 ENCOUNTER — TELEPHONE (OUTPATIENT)
Dept: NEUROLOGY | Facility: CLINIC | Age: 58
End: 2018-05-24

## 2018-09-14 ENCOUNTER — APPOINTMENT (OUTPATIENT)
Dept: CT IMAGING | Facility: HOSPITAL | Age: 58
End: 2018-09-14

## 2018-09-14 ENCOUNTER — APPOINTMENT (OUTPATIENT)
Dept: GENERAL RADIOLOGY | Facility: HOSPITAL | Age: 58
End: 2018-09-14

## 2018-09-14 ENCOUNTER — HOSPITAL ENCOUNTER (EMERGENCY)
Facility: HOSPITAL | Age: 58
Discharge: HOME | End: 2018-09-15
Attending: EMERGENCY MEDICINE | Admitting: EMERGENCY MEDICINE

## 2018-09-14 DIAGNOSIS — R55 NEAR SYNCOPE: Primary | ICD-10-CM

## 2018-09-14 DIAGNOSIS — Z87.898 HISTORY OF SYNCOPE: ICD-10-CM

## 2018-09-14 DIAGNOSIS — Z86.73 HISTORY OF STROKE: ICD-10-CM

## 2018-09-14 LAB
ALBUMIN SERPL-MCNC: 4.68 G/DL (ref 3.2–4.8)
ALBUMIN/GLOB SERPL: 2.3 G/DL (ref 1.5–2.5)
ALP SERPL-CCNC: 79 U/L (ref 25–100)
ALT SERPL W P-5'-P-CCNC: 53 U/L (ref 7–40)
ANION GAP SERPL CALCULATED.3IONS-SCNC: 10 MMOL/L (ref 3–11)
AST SERPL-CCNC: 54 U/L (ref 0–33)
BASOPHILS # BLD AUTO: 0.04 10*3/MM3 (ref 0–0.2)
BASOPHILS NFR BLD AUTO: 0.6 % (ref 0–1)
BILIRUB SERPL-MCNC: 0.5 MG/DL (ref 0.3–1.2)
BNP SERPL-MCNC: <2 PG/ML (ref 0–100)
BUN BLD-MCNC: 10 MG/DL (ref 9–23)
BUN/CREAT SERPL: 13 (ref 7–25)
CALCIUM SPEC-SCNC: 9.6 MG/DL (ref 8.7–10.4)
CHLORIDE SERPL-SCNC: 99 MMOL/L (ref 99–109)
CO2 SERPL-SCNC: 28 MMOL/L (ref 20–31)
CREAT BLD-MCNC: 0.77 MG/DL (ref 0.6–1.3)
DEPRECATED RDW RBC AUTO: 43.6 FL (ref 37–54)
EOSINOPHIL # BLD AUTO: 0.18 10*3/MM3 (ref 0–0.3)
EOSINOPHIL NFR BLD AUTO: 2.7 % (ref 0–3)
ERYTHROCYTE [DISTWIDTH] IN BLOOD BY AUTOMATED COUNT: 13.2 % (ref 11.3–14.5)
GFR SERPL CREATININE-BSD FRML MDRD: 77 ML/MIN/1.73
GLOBULIN UR ELPH-MCNC: 2 GM/DL
GLUCOSE BLD-MCNC: 150 MG/DL (ref 70–100)
HCT VFR BLD AUTO: 48 % (ref 34.5–44)
HGB BLD-MCNC: 15.8 G/DL (ref 11.5–15.5)
HOLD SPECIMEN: NORMAL
IMM GRANULOCYTES # BLD: 0.02 10*3/MM3 (ref 0–0.03)
IMM GRANULOCYTES NFR BLD: 0.3 % (ref 0–0.6)
LIPASE SERPL-CCNC: 83 U/L (ref 6–51)
LYMPHOCYTES # BLD AUTO: 1.97 10*3/MM3 (ref 0.6–4.8)
LYMPHOCYTES NFR BLD AUTO: 29.5 % (ref 24–44)
MCH RBC QN AUTO: 29.9 PG (ref 27–31)
MCHC RBC AUTO-ENTMCNC: 32.9 G/DL (ref 32–36)
MCV RBC AUTO: 90.7 FL (ref 80–99)
MONOCYTES # BLD AUTO: 0.41 10*3/MM3 (ref 0–1)
MONOCYTES NFR BLD AUTO: 6.1 % (ref 0–12)
NEUTROPHILS # BLD AUTO: 4.08 10*3/MM3 (ref 1.5–8.3)
NEUTROPHILS NFR BLD AUTO: 61.1 % (ref 41–71)
PLATELET # BLD AUTO: 189 10*3/MM3 (ref 150–450)
PMV BLD AUTO: 10.8 FL (ref 6–12)
POTASSIUM BLD-SCNC: 4.1 MMOL/L (ref 3.5–5.5)
PROT SERPL-MCNC: 6.7 G/DL (ref 5.7–8.2)
RBC # BLD AUTO: 5.29 10*6/MM3 (ref 3.89–5.14)
SODIUM BLD-SCNC: 137 MMOL/L (ref 132–146)
TROPONIN I SERPL-MCNC: 0 NG/ML (ref 0–0.07)
TROPONIN I SERPL-MCNC: 0 NG/ML (ref 0–0.07)
WBC NRBC COR # BLD: 6.68 10*3/MM3 (ref 3.5–10.8)
WHOLE BLOOD HOLD SPECIMEN: NORMAL
WHOLE BLOOD HOLD SPECIMEN: NORMAL

## 2018-09-14 PROCEDURE — 93005 ELECTROCARDIOGRAM TRACING: CPT

## 2018-09-14 PROCEDURE — 80053 COMPREHEN METABOLIC PANEL: CPT | Performed by: EMERGENCY MEDICINE

## 2018-09-14 PROCEDURE — 84484 ASSAY OF TROPONIN QUANT: CPT

## 2018-09-14 PROCEDURE — 99285 EMERGENCY DEPT VISIT HI MDM: CPT

## 2018-09-14 PROCEDURE — 71275 CT ANGIOGRAPHY CHEST: CPT

## 2018-09-14 PROCEDURE — 0 IOPAMIDOL PER 1 ML: Performed by: EMERGENCY MEDICINE

## 2018-09-14 PROCEDURE — 71045 X-RAY EXAM CHEST 1 VIEW: CPT

## 2018-09-14 PROCEDURE — 96360 HYDRATION IV INFUSION INIT: CPT

## 2018-09-14 PROCEDURE — 85025 COMPLETE CBC W/AUTO DIFF WBC: CPT

## 2018-09-14 PROCEDURE — 83880 ASSAY OF NATRIURETIC PEPTIDE: CPT | Performed by: EMERGENCY MEDICINE

## 2018-09-14 PROCEDURE — 83690 ASSAY OF LIPASE: CPT | Performed by: EMERGENCY MEDICINE

## 2018-09-14 PROCEDURE — 93005 ELECTROCARDIOGRAM TRACING: CPT | Performed by: EMERGENCY MEDICINE

## 2018-09-14 RX ORDER — AMOXICILLIN AND CLAVULANATE POTASSIUM 875; 125 MG/1; MG/1
1 TABLET, FILM COATED ORAL 2 TIMES DAILY
COMMUNITY
End: 2019-06-26

## 2018-09-14 RX ORDER — ASPIRIN 81 MG/1
324 TABLET, CHEWABLE ORAL ONCE
Status: DISCONTINUED | OUTPATIENT
Start: 2018-09-14 | End: 2018-09-14

## 2018-09-14 RX ORDER — SODIUM CHLORIDE 0.9 % (FLUSH) 0.9 %
10 SYRINGE (ML) INJECTION AS NEEDED
Status: DISCONTINUED | OUTPATIENT
Start: 2018-09-14 | End: 2018-09-15 | Stop reason: HOSPADM

## 2018-09-14 RX ORDER — OLOPATADINE HYDROCHLORIDE 2 MG/ML
1 SOLUTION/ DROPS OPHTHALMIC DAILY
COMMUNITY
End: 2023-01-17

## 2018-09-14 RX ORDER — ESOMEPRAZOLE MAGNESIUM 40 MG/1
40 CAPSULE, DELAYED RELEASE ORAL
COMMUNITY
End: 2018-09-26

## 2018-09-14 RX ORDER — ROSUVASTATIN CALCIUM 10 MG/1
10 TABLET, COATED ORAL DAILY
COMMUNITY

## 2018-09-14 RX ADMIN — IOPAMIDOL 95 ML: 755 INJECTION, SOLUTION INTRAVENOUS at 21:30

## 2018-09-14 RX ADMIN — SODIUM CHLORIDE 1000 ML: 9 INJECTION, SOLUTION INTRAVENOUS at 21:06

## 2018-09-15 VITALS
BODY MASS INDEX: 33.62 KG/M2 | OXYGEN SATURATION: 93 % | WEIGHT: 227 LBS | RESPIRATION RATE: 18 BRPM | HEART RATE: 95 BPM | SYSTOLIC BLOOD PRESSURE: 131 MMHG | TEMPERATURE: 97.9 F | DIASTOLIC BLOOD PRESSURE: 79 MMHG | HEIGHT: 69 IN

## 2018-09-15 NOTE — ED PROVIDER NOTES
Subjective   Ms. Katelyn Wilson is a 58 y.o. female who presents to the ED with c/o dizziness onset 1 day ago. Pt reports yesterday while sitting in her car she had sudden onset of light-headedness, nausea, pallor, mild cervical back pain, and severe diaphoresis, all of which were followed by sudden onset of bladder incontinence and vomiting. Following initial onset pt returned home and the symptoms gradually resolved. She denies any vomiting episodes today, however she complains of fatigue, intermittent dizziness onset three weeks ago, and intermittent SoA. She also denies chest pain, diarrhea, abd pain, and any other acute sx at this time. Pt notes recent change in her allergy medication and she has hx of HTN, fibromyalgia, HLD, obesity, DM, osteoarthritis, CVA, chronic anti-coagulation with Plavix, uretral stone, tendonitis, calculus distal right ureter, ataxia, MI, aortic aneurysm, appendectomy, cholecystectomy, hysterectomy.             History provided by:  Patient  Dizziness   Quality:  Lightheadedness  Severity:  Moderate  Onset quality:  Sudden  Timing:  Intermittent  Progression:  Waxing and waning  Chronicity:  New  Relieved by:  None tried  Worsened by:  Nothing  Ineffective treatments:  None tried  Associated symptoms: nausea, shortness of breath and vomiting    Associated symptoms: no chest pain and no diarrhea    Risk factors: hx of stroke        Review of Systems   Constitutional: Positive for diaphoresis and fatigue.   Respiratory: Positive for shortness of breath.    Cardiovascular: Negative for chest pain.   Gastrointestinal: Positive for nausea and vomiting. Negative for abdominal pain and diarrhea.   Musculoskeletal: Positive for back pain.   Skin: Positive for color change (pallor).   Neurological: Positive for dizziness and light-headedness.   All other systems reviewed and are negative.      Past Medical History:   Diagnosis Date   • Ataxia    • Calculus of distal right ureter    •  Chest pain     [facility] feb 2003-near normal coronaries, Cardiolite 2007-Exercised for 7 minutes and 22 seconds-no evidence of ischemia, LVEF 84%, ER presentation on 3/17/16 Dr. 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    • Hyperlipidemia    • Hypertension    • Muscle weakness (generalized)    • Neck pain    • Obesity    • Osteoarthritis    • Stroke (CMS/HCC)     MRI of brain 2001 revealing Microvascular changes and evidence of 3mm, left basal fanglia lacunar infarct with initiation of Plavix   • Syncope     neg Tilt Table 11/09/2011   • Tendonitis    • Ureteral stone        Allergies   Allergen Reactions   • Atorvastatin    • Codeine Other (See Comments)     Tachycardia   • Diltiazem Other (See Comments)     Headache   • Estrogens    • Oxycodone    • Propoxyphene    • Diovan [Valsartan] Rash       Past Surgical History:   Procedure Laterality Date   • APPENDECTOMY     • CHOLECYSTECTOMY     • HYSTERECTOMY     • ROTATOR CUFF REPAIR Left        Family History   Problem Relation Age of Onset   • Colon cancer Mother    • Diabetes Mother    • Hypertension Mother    • Colon cancer Father    • Hypertension Father    • Prostate cancer Father    • Diabetes Maternal Grandmother    • Hypertension Maternal Grandmother    • Diabetes Maternal Grandfather    • Prostate cancer Maternal Grandfather    • Hypertension Paternal Grandmother    • Lung cancer Paternal Grandfather    • Prostate cancer Paternal Grandfather        Social History     Social History   • Marital status:      Social History Main Topics   • Smoking status: Former Smoker     Types: Cigarettes     Quit date: 1992   • Smokeless tobacco: Never Used   • Alcohol use No   • Drug use: No    • Sexual activity: Defer     Other Topics Concern   • Not on file         Objective   Physical Exam   Constitutional: She is oriented to person, place, and time. She appears well-developed and well-nourished. No distress.   Pt appears apprehensive.    HENT:   Head: Normocephalic and atraumatic.   Right Ear: External ear normal.   Left Ear: External ear normal.   Nose: Nose normal.   Eyes: Conjunctivae are normal. No scleral icterus.   Neck: Normal range of motion.   Cardiovascular: Normal rate, regular rhythm and normal heart sounds.  Exam reveals no gallop and no friction rub.    No murmur heard.  Pulmonary/Chest: Effort normal and breath sounds normal. No respiratory distress. She has no wheezes. She has no rales. She exhibits no tenderness.   Abdominal: Soft. Bowel sounds are normal.   Musculoskeletal: Normal range of motion.   Neurological: She is alert and oriented to person, place, and time.   Skin: Skin is warm and dry. She is not diaphoretic.   Psychiatric: She has a normal mood and affect. Her behavior is normal.   Nursing note and vitals reviewed.      Procedures         ED Course  ED Course as of Sep 15 0023   Fri Sep 14, 2018   2035 Old records reviewed. [facility] notes last CT chest was 08/2016 revealing aortic root 3.5x3.9cm   [AT]   2342 AST (SGOT): (!) 54 [ML]   Sat Sep 15, 2018   0021 Patient has ambulated in the department well without orthostatic dizziness or near syncope.    [ML]      ED Course User Index  [AT] Jake Nicole  [ML] Samantha Jernigan APRN     Recent Results (from the past 24 hour(s))   Comprehensive Metabolic Panel    Collection Time: 09/14/18  7:05 PM   Result Value Ref Range    Glucose 150 (H) 70 - 100 mg/dL    BUN 10 9 - 23 mg/dL    Creatinine 0.77 0.60 - 1.30 mg/dL    Sodium 137 132 - 146 mmol/L    Potassium 4.1 3.5 - 5.5 mmol/L    Chloride 99 99 - 109 mmol/L    CO2 28.0 20.0 - 31.0 mmol/L    Calcium 9.6 8.7 - 10.4 mg/dL    Total Protein 6.7 5.7 - 8.2 g/dL    Albumin  4.68 3.20 - 4.80 g/dL    ALT (SGPT) 53 (H) 7 - 40 U/L    AST (SGOT) 54 (H) 0 - 33 U/L    Alkaline Phosphatase 79 25 - 100 U/L    Total Bilirubin 0.5 0.3 - 1.2 mg/dL    eGFR Non African Amer 77 >60 mL/min/1.73    Globulin 2.0 gm/dL    A/G Ratio 2.3 1.5 - 2.5 g/dL    BUN/Creatinine Ratio 13.0 7.0 - 25.0    Anion Gap 10.0 3.0 - 11.0 mmol/L   Lipase    Collection Time: 09/14/18  7:05 PM   Result Value Ref Range    Lipase 83 (H) 6 - 51 U/L   BNP    Collection Time: 09/14/18  7:05 PM   Result Value Ref Range    BNP <2.0 0.0 - 100.0 pg/mL   Light Blue Top    Collection Time: 09/14/18  7:05 PM   Result Value Ref Range    Extra Tube hold for add-on    Green Top (Gel)    Collection Time: 09/14/18  7:05 PM   Result Value Ref Range    Extra Tube Hold for add-ons.    Lavender Top    Collection Time: 09/14/18  7:05 PM   Result Value Ref Range    Extra Tube hold for add-on    CBC Auto Differential    Collection Time: 09/14/18  7:05 PM   Result Value Ref Range    WBC 6.68 3.50 - 10.80 10*3/mm3    RBC 5.29 (H) 3.89 - 5.14 10*6/mm3    Hemoglobin 15.8 (H) 11.5 - 15.5 g/dL    Hematocrit 48.0 (H) 34.5 - 44.0 %    MCV 90.7 80.0 - 99.0 fL    MCH 29.9 27.0 - 31.0 pg    MCHC 32.9 32.0 - 36.0 g/dL    RDW 13.2 11.3 - 14.5 %    RDW-SD 43.6 37.0 - 54.0 fl    MPV 10.8 6.0 - 12.0 fL    Platelets 189 150 - 450 10*3/mm3    Neutrophil % 61.1 41.0 - 71.0 %    Lymphocyte % 29.5 24.0 - 44.0 %    Monocyte % 6.1 0.0 - 12.0 %    Eosinophil % 2.7 0.0 - 3.0 %    Basophil % 0.6 0.0 - 1.0 %    Immature Grans % 0.3 0.0 - 0.6 %    Neutrophils, Absolute 4.08 1.50 - 8.30 10*3/mm3    Lymphocytes, Absolute 1.97 0.60 - 4.80 10*3/mm3    Monocytes, Absolute 0.41 0.00 - 1.00 10*3/mm3    Eosinophils, Absolute 0.18 0.00 - 0.30 10*3/mm3    Basophils, Absolute 0.04 0.00 - 0.20 10*3/mm3    Immature Grans, Absolute 0.02 0.00 - 0.03 10*3/mm3   POC Troponin, Rapid    Collection Time: 09/14/18  7:11 PM   Result Value Ref Range    Troponin I 0.00 0.00 - 0.07 ng/mL   POC  Troponin, Rapid    Collection Time: 09/14/18  9:17 PM   Result Value Ref Range    Troponin I 0.00 0.00 - 0.07 ng/mL     Note: In addition to lab results from this visit, the labs listed above may include labs taken at another facility or during a different encounter within the last 24 hours. Please correlate lab times with ED admission and discharge times for further clarification of the services performed during this visit.    CT Angiogram Chest With & Without Contrast   Final Result     No acute findings.      THIS DOCUMENT HAS BEEN ELECTRONICALLY SIGNED BY JAYE BOUDREAUX MD      XR Chest 1 View   Final Result     Normal chest x-ray.      THIS DOCUMENT HAS BEEN ELECTRONICALLY SIGNED BY JAYE BOUDREAUX MD        Vitals:    09/15/18 0000 09/15/18 0015 09/15/18 0016 09/15/18 0019   BP: 110/67 123/88 115/82 131/79   Patient Position:  Sitting Standing Lying   Pulse: 72 112 94 95   Resp:       Temp:       TempSrc:       SpO2: 93%      Weight:       Height:         Medications   sodium chloride 0.9 % flush 10 mL (not administered)   sodium chloride 0.9 % flush 10 mL (not administered)   sodium chloride 0.9 % bolus 1,000 mL (0 mL Intravenous Stopped 9/14/18 2159)   iopamidol (ISOVUE-370) 76 % injection 100 mL (95 mL Intravenous Given 9/14/18 2130)     ECG/EMG Results (last 24 hours)     Procedure Component Value Units Date/Time    ECG 12 Lead [[id number]] Collected:  09/14/18 1900     Updated:  09/14/18 1900                      MDM    Final diagnoses:   History of syncope   History of stroke   Near syncope       Documentation assistance provided by vince Nicole.  Information recorded by the vince was done at my direction and has been verified and validated by me.     Jake Nicole  09/14/18 2034       Jake Nicole  09/15/18 0007       Samantha Jernigan APRN  09/15/18 0023       Samantha Jernigan APRN  09/15/18 0023

## 2018-09-15 NOTE — DISCHARGE INSTRUCTIONS
Follow up with Dr. Andrade, neurology, at next available. Call to schedule an appointment.     Follow up with your primary care physician at next available. Call to schedule this appointment.     Immediately return to the emergency department for any new or worsening symptoms.     Keep a written log of your blood pressure readings and present to your provider on MOnday or Tuesday for review.

## 2018-09-26 ENCOUNTER — OFFICE VISIT (OUTPATIENT)
Dept: CARDIOLOGY | Facility: CLINIC | Age: 58
End: 2018-09-26

## 2018-09-26 VITALS
WEIGHT: 229 LBS | DIASTOLIC BLOOD PRESSURE: 68 MMHG | HEIGHT: 69 IN | HEART RATE: 78 BPM | SYSTOLIC BLOOD PRESSURE: 110 MMHG | BODY MASS INDEX: 33.92 KG/M2

## 2018-09-26 DIAGNOSIS — R42 DIZZINESS: Primary | ICD-10-CM

## 2018-09-26 DIAGNOSIS — I77.810 AORTIC ECTASIA, THORACIC (HCC): ICD-10-CM

## 2018-09-26 PROCEDURE — 99213 OFFICE O/P EST LOW 20 MIN: CPT | Performed by: NURSE PRACTITIONER

## 2018-09-26 NOTE — PROGRESS NOTES
Katelyn Berrios Steve  1960  [phone]      09/26/2018    Hsu, OTTO Gaytan    Chief Complaint   Patient presents with   • Other chest pain     Problem List  1.  Chest pain with mixed features of angina pectoris:   A.  [facility], [place], 2/2003:  Revealing near normal coronaries   B.  Cardiolite [facility], 12/14/07:  Exercise for 7 minutes and 22 seconds reaching target heart rate with no chest pain or severe dyspnea and no evidence of ischemia.  Peak blood pressure 160/90 and EF of 84%   C.  Normal echocardiogram 11/2010   D.  Normal dobutamine stress echo, 12/20/2010   E.  [facility], also, 3/17/16: Near normal coronary arteries, no evidence of hemodynamically significant coronary artery disease, left ejection fraction 65%, incidental aneurysm noted with CT follow-up recommended  2.  Thoracic aortic aneurysm   A.  CT angiogram of chest 3/17/16: Mild ectasia of the ascending aorta measuring 3.4 x 3.8 cm, no acute chest pathology  3.  Syncope   A.  Normal EEG, 7/28/11   B.  Normal Holter, 7/19/11   C.  Negative tilt table, 11/9/11  4.  Hypertension  5.  Dyslipidemia  6.  Obesity  7.  Fibromyalgia  8.  Osteoarthritis  9.  Borderline diabetes mellitus    Allergies   Allergen Reactions   • Atorvastatin    • Codeine Other (See Comments)     Tachycardia   • Diltiazem Other (See Comments)     Headache   • Estrogens    • Oxycodone    • Propoxyphene    • Diovan [Valsartan] Rash       Current Medications    Current Outpatient Prescriptions:   •  albuterol (PROAIR HFA) 108 (90 BASE) MCG/ACT inhaler, ProAir  (90 Base) MCG/ACT Inhalation Aerosol Solution; Patient Sig: ProAir  (90 Base) MCG/ACT Inhalation Aerosol Solution ; 8; 0; 20-Feb-2014; Active, Disp: , Rfl:   •  ALLERGY RELIEF 180 MG tablet, , Disp: , Rfl: 0  •  amoxicillin-clavulanate (AUGMENTIN) 875-125 MG per tablet, Take 1 tablet by mouth 2 (Two) Times a Day., Disp: , Rfl:   •  cyanocobalamin 1000 MCG/ML injection, Cyanocobalamin 1000 MCG/ML Injection  Solution; Patient Sig: Cyanocobalamin 1000 MCG/ML Injection Solution ; 1; 0; 22-Apr-2013; Active, Disp: , Rfl:   •  Dapagliflozin Propanediol (FARXIGA) 10 MG tablet, Take 10 mg by mouth Daily., Disp: , Rfl:   •  desloratadine (CLARINEX) 5 MG tablet, Take 5 mg by mouth Daily., Disp: , Rfl:   •  DULoxetine (CYMBALTA) 60 MG capsule, Take 60 mg by mouth Daily., Disp: , Rfl:   •  esomeprazole (nexIUM) 40 MG capsule, Take 40 mg by mouth Every Morning Before Breakfast., Disp: , Rfl:   •  ezetimibe (ZETIA) 10 MG tablet, Take  by mouth Daily., Disp: , Rfl:   •  gabapentin (NEURONTIN) 600 MG tablet, Take 600 mg by mouth 5 (Five) Times a Day. 2 tabs bid, Disp: , Rfl:   •  lisinopril (PRINIVIL,ZESTRIL) 10 MG tablet, Take 10 mg by mouth Daily., Disp: , Rfl:   •  metFORMIN (GLUCOPHAGE) 1000 MG tablet, Take 1,000 mg by mouth 2 (Two) Times a Day With Meals., Disp: , Rfl:   •  metoprolol tartrate (LOPRESSOR) 25 MG tablet, Take 25 mg by mouth daily. 1/2 tablet daily, Disp: , Rfl:   •  mometasone (NASONEX) 50 MCG/ACT nasal spray, 2 sprays into each nostril Daily., Disp: , Rfl:   •  montelukast (SINGULAIR) 10 MG tablet, Take  by mouth Every Night., Disp: , Rfl:   •  nitroglycerin (NITROSTAT) 0.4 MG SL tablet, Place 0.4 mg under the tongue every 5 (five) minutes as needed for chest pain. Take no more than 3 doses in 15 minutes., Disp: , Rfl:   •  olopatadine (PATADAY) 0.2 % solution ophthalmic solution, 1 drop Daily., Disp: , Rfl:   •  ondansetron ODT (ZOFRAN-ODT) 8 MG disintegrating tablet, , Disp: , Rfl: 0  •  Oxymetazoline HCl (AFRIN 12 HOUR NA), into each nostril., Disp: , Rfl:   •  rosuvastatin (CRESTOR) 10 MG tablet, Take 10 mg by mouth Daily., Disp: , Rfl:   •  salsalate (DISALCID) 500 MG tablet, Take  by mouth 2 (Two) Times a Day., Disp: , Rfl:   •  SITagliptin (JANUVIA) 100 MG tablet, Take 100 mg by mouth Daily., Disp: , Rfl:   •  clopidogrel (PLAVIX) 75 MG tablet, Take  by mouth Daily., Disp: , Rfl:     History of Present  Illness   HPI  Patient is a very pleasant 58-year-old female who presents today for ER follow-up regarding an episode of dizziness.  She states that she's been suffering from this for the past 3-4 weeks and was initially given antibiotics and meclizine by her primary care.  She does tend to have frequent sinusitis.  2 weeks ago, while waiting in the truck for a friend to show up, she had a sudden onset of dizziness associated with nausea, vomiting ×2 and pain across the top of her back.  She states that there was no tunnel vision and she had no palpitations or fluttering sensations to her chest.  She is unsure as to what her blood pressures are during these episodes.  She was seen and treated in our emergency department and was discharged home in stable condition.  Her orthostatics were negative and she had a negative cardiac workup.  They did do a CT scan of her abdomen indicating that her aneurysm is measuring at 4 cm.  Since that ER visit she has followed up with ENT and thinks that it may be related to inner ear issues.  She currently is on antibiotics for one month along with nasal spray.  She states that her symptoms have improved though she still gets random episodes of dizziness that are brief in duration and not as severe.  She does note that the dizziness is significantly worse with lying down and improves while sitting up.  I have asked her to check her blood pressures when she feels dizzy to see if there is a hypotensive component to this.  We could consider cutting her lisinopril to 5 mg daily however, I need her blood pressure to stay low in regards to her aneurysm.  At this point in time I don't know that we need to pursue cardiac testing until she completes the course of therapy for inner ear issues.  If she remains symptomatic at that time that we can pursue further options.  The pain across her back seemed to be a single occurrence and she did have a normal cardiac catheterization approximately 2  "years ago.  She is not interested in percent cardiac testing at this time but will let us know if something should change.  She denies having any current chest pain, shortness of breath, dyspnea on exertion, edema, fatigue, palpitations, and syncope.      The following portions of the patient's history were reviewed and updated as appropriate: allergies, current medications and problem list.    Pertinent positives as listed in the HPI.  All other systems reviewed are negative.    Vitals:    09/26/18 1105   BP: 110/68   BP Location: Right arm   Patient Position: Sitting   Pulse: 78   Weight: 104 kg (229 lb)   Height: 175.3 cm (69\")       Physical Exam  GENERAL: well-developed, well-nourished; in no acute distress.   NECK:  There is no jugular venous distention at 30°.  Carotid upstrokes are 2+ and  symmetrical without bruits.   LUNGS: Clear to auscultation bilaterally without wheezing, rhonchi, or rales noted.   CARDIOVASCULAR: The heart has a regular rate with a normal S1 and S2. There is no murmur, gallop, rub, or click appreciated. The PMI is nondisplaced.   ABDOMEN: Soft and nontender  NEUROLOGICAL: Nonfocal; Alert and oriented  PERIPHERAL VASCULAR:  Posterior tibial pulses are 2+ and symmetrical. There is no peripheral edema.   SKIN:  Warm and dry  PSYCHIATRIC: normal mood and affect; behavior appropriate    Diagnostic Data:  Procedures    Assessment:      ICD-10-CM ICD-9-CM   1. Dizziness R42 780.4   2. Aortic ectasia, thoracic (CMS/[facility]) I77.810 447.71       Plan:  Can decrease Lisinopril to 5mg if hypotensive during dizzy spells.    Monitor bp when dizzy.  Call after course of therapy completed with ENT; if still symptomatic, then consider further cardiac testing.   F/up in 6 months or sooner if needed.    Seen independently by OTTO Maria on September 26, 2018 @ 1005      "

## 2018-11-01 ENCOUNTER — TRANSCRIBE ORDERS (OUTPATIENT)
Dept: ADMINISTRATIVE | Facility: HOSPITAL | Age: 58
End: 2018-11-01

## 2018-11-01 DIAGNOSIS — J01.81 OTHER ACUTE RECURRENT SINUSITIS: Primary | ICD-10-CM

## 2018-11-08 ENCOUNTER — HOSPITAL ENCOUNTER (OUTPATIENT)
Dept: CT IMAGING | Facility: HOSPITAL | Age: 58
Discharge: HOME | End: 2018-11-08
Admitting: OTOLARYNGOLOGY

## 2018-11-08 DIAGNOSIS — J01.81 OTHER ACUTE RECURRENT SINUSITIS: ICD-10-CM

## 2018-11-08 PROCEDURE — 70486 CT MAXILLOFACIAL W/O DYE: CPT

## 2019-02-05 ENCOUNTER — TRANSCRIBE ORDERS (OUTPATIENT)
Dept: ADMINISTRATIVE | Facility: HOSPITAL | Age: 59
End: 2019-02-05

## 2019-02-05 DIAGNOSIS — R10.9 PAIN, FLANK, BILATERAL: Primary | ICD-10-CM

## 2019-02-08 ENCOUNTER — APPOINTMENT (OUTPATIENT)
Dept: ULTRASOUND IMAGING | Facility: HOSPITAL | Age: 59
End: 2019-02-08

## 2019-06-25 NOTE — PROGRESS NOTES
Katelyn Yash Wilson  1960  [phone]  Work phone not available    06/26/2019    Hsu, OTTO Gaytan    Chief Complaint   Patient presents with   • Dizziness     Problem List  1.  Chest pain with mixed features of angina pectoris:              A.  [facility], [place], 2/2003:  Revealing near normal coronaries              B.  Cardiolite [facility], 12/14/07:  Exercise for 7 minutes and 22 seconds reaching target heart rate with no chest pain or severe dyspnea and no evidence of ischemia.  Peak blood pressure 160/90 and EF of 84%              C.  Normal echocardiogram 11/2010              D.  Normal dobutamine stress echo, 12/20/2010              E.  [facility], also, 3/17/16: Near normal coronary arteries, no evidence of hemodynamically significant coronary artery disease, left ejection fraction 65%, incidental aneurysm noted with CT follow-up recommended  2.  Thoracic aortic aneurysm              A.  CT angiogram of chest 3/17/16: Mild ectasia of the ascending aorta measuring 3.4 x 3.8 cm, no acute chest pathology   B.  CT of chest, 9/14/18: Ascending aorta is upper limits of normal diameter, 4 cm   3.  Syncope              A.  Normal EEG, 7/28/11              B.  Normal Holter, 7/19/11              C.  Negative tilt table, 11/9/11  4.  Hypertension  5.  Dyslipidemia  6.  Obesity  7.  Fibromyalgia  8.  Osteoarthritis  9.  Borderline diabetes mellitus    Allergies   Allergen Reactions   • Atorvastatin    • Codeine Other (See Comments)     Tachycardia   • Diltiazem Other (See Comments)     Headache   • Estrogens    • Oxycodone    • Propoxyphene    • Diovan [Valsartan] Rash       Current Medications    Current Outpatient Medications:   •  albuterol (PROAIR HFA) 108 (90 BASE) MCG/ACT inhaler, ProAir  (90 Base) MCG/ACT Inhalation Aerosol Solution; Patient Sig: ProAir  (90 Base) MCG/ACT Inhalation Aerosol Solution ; 8; 0; 20-Feb-2014; Active, Disp: , Rfl:   •  ARIPiprazole (ABILIFY) 10 MG tablet, Take 10 mg by  mouth Daily., Disp: , Rfl: 2  •  CloNIDine (CATAPRES) 0.1 MG tablet, Take 0.1 mg by mouth Daily., Disp: , Rfl: 2  •  clopidogrel (PLAVIX) 75 MG tablet, Take 75 mg by mouth Daily., Disp: , Rfl:   •  cyanocobalamin 1000 MCG/ML injection, Cyanocobalamin 1000 MCG/ML Injection Solution; Patient Sig: Cyanocobalamin 1000 MCG/ML Injection Solution ; 1; 0; 22-Apr-2013; Active, Disp: , Rfl:   •  Dapagliflozin Propanediol (FARXIGA) 10 MG tablet, Take 10 mg by mouth Daily., Disp: , Rfl:   •  desloratadine (CLARINEX) 5 MG tablet, Take 5 mg by mouth Daily., Disp: , Rfl:   •  DULoxetine (CYMBALTA) 30 MG capsule, Take 30 mg by mouth Daily., Disp: , Rfl: 1  •  DULoxetine (CYMBALTA) 60 MG capsule, Take 60 mg by mouth Daily., Disp: , Rfl:   •  ezetimibe (ZETIA) 10 MG tablet, Take 10 mg by mouth Daily., Disp: , Rfl:   •  gabapentin (NEURONTIN) 600 MG tablet, Take 600 mg by mouth 5 (Five) Times a Day. 2 tabs bid, Disp: , Rfl:   •  HYDROcodone-acetaminophen (NORCO)  MG per tablet, Take 1 tablet by mouth As Needed., Disp: , Rfl: 0  •  lisinopril (PRINIVIL,ZESTRIL) 10 MG tablet, Take 10 mg by mouth Daily., Disp: , Rfl:   •  metFORMIN (GLUCOPHAGE) 1000 MG tablet, Take 1,000 mg by mouth 2 (Two) Times a Day With Meals., Disp: , Rfl:   •  metoprolol tartrate (LOPRESSOR) 25 MG tablet, Take 25 mg by mouth daily. 1/2 tablet daily, Disp: , Rfl:   •  montelukast (SINGULAIR) 10 MG tablet, Take 10 mg by mouth Every Night., Disp: , Rfl:   •  nitroglycerin (NITROSTAT) 0.4 MG SL tablet, Place 0.4 mg under the tongue every 5 (five) minutes as needed for chest pain. Take no more than 3 doses in 15 minutes., Disp: , Rfl:   •  olopatadine (PATADAY) 0.2 % solution ophthalmic solution, 1 drop Daily., Disp: , Rfl:   •  QUEtiapine (SEROquel) 25 MG tablet, Take 25 mg by mouth Every Night., Disp: , Rfl: 2  •  rosuvastatin (CRESTOR) 10 MG tablet, Take 10 mg by mouth Daily., Disp: , Rfl:   •  SITagliptin (JANUVIA) 100 MG tablet, Take 100 mg by mouth Daily.,  Disp: , Rfl:   •  traMADol (ULTRAM) 50 MG tablet, Take 50 mg by mouth As Needed., Disp: , Rfl: 0    History of Present Illness   HPI  Patient is a pleasant 58-year-old female with the above-noted medical history presents today for her routine follow-up of thoracic aortic aneurysm, hypertension and hyperlipidemia.  Since she was last seen, she had been doing well from the cardiac standpoint up until last week when she experienced an episode of dizziness while sitting on the couch watching television.  She said that she became diaphoretic and nauseated and laid down, which made her symptoms worse.  She states the total episode lasted approximately 5 minutes in duration and gradually resolved on its own.  She states this is similar to the same episode she had back in September.  She does have a known history of an inner ear issue but states that she has not had any dizziness stemming from that.  It does not bother her to turn her head from side to side.  She states that she hydrates herself appropriately and does not eat salt.  She states that she recently was placed on clonidine and Seroquel for depression and anxiety.  I am concerned that the clonidine is lowering her blood pressure too much.  I also wonder if she meant Klonopin as she states that it was first to help her sleep.  She is going to double check her medications when she gets home to be sure.  She states she does not know what her blood pressure runs during her dizzy episodes that she is afraid to stand up to check it.  She was not orthostatic today.  She is going to monitor her blood pressures accordingly and give us a call in a few weeks to let us know how they are doing.  She denies having any chest pain, shortness of breath, dyspnea on exertion, edema, fatigue, palpitations, or syncope.    The following portions of the patient's history were reviewed and updated as appropriate: allergies, current medications and problem list.    Pertinent positives as  "listed in the HPI.  All other systems reviewed are negative.    Vitals:    06/26/19 1113   BP: 92/62   BP Location: Right arm   Patient Position: Sitting   Pulse: 74   Weight: 100 kg (221 lb)   Height: 175.3 cm (69\")       Physical Exam  GENERAL: well-developed, well-nourished; in no acute distress.   NECK:    Carotid upstrokes are 2+ and  symmetrical without bruits.   LUNGS: Clear to auscultation bilaterally without wheezing, rhonchi, or rales noted.   CARDIOVASCULAR: The heart has a regular rate with a normal S1 and S2. There is 1/6 murmur, gallop, rub, or click appreciated. The PMI is nondisplaced.   ABDOMEN: Soft and nontender  NEUROLOGICAL: Nonfocal; Alert and oriented  PERIPHERAL VASCULAR:  Posterior tibial pulses are 2+ and symmetrical. There is no peripheral edema.   SKIN:  Warm and dry  PSYCHIATRIC: normal mood and affect; behavior appropriate    Diagnostic Data:  Procedures    Assessment:      ICD-10-CM ICD-9-CM   1. Cardiac murmur R01.1 785.2   2. Dizziness R42 780.4   3. Aortic ectasia, thoracic (CMS/[facility]) I77.810 447.71   4. Essential hypertension I10 401.9   5. Hyperlipidemia LDL goal <100 E78.5 272.4       Plan:  Echocardiogram for dizziness and murmur  Stop clonidine for hypotension and dizziness  Check blood pressures at home and call in a few weeks with readings   Continue lisinopril daily for hypertension  Continue Crestor daily for hyperlipidemia  Continue all other medications as directed  F/up in 6 months or sooner if needed.      Seen independently by OTTO Maria on June 26, 2019 @ 1102      "

## 2019-06-26 ENCOUNTER — OFFICE VISIT (OUTPATIENT)
Dept: CARDIOLOGY | Facility: CLINIC | Age: 59
End: 2019-06-26

## 2019-06-26 VITALS
DIASTOLIC BLOOD PRESSURE: 62 MMHG | HEIGHT: 69 IN | WEIGHT: 221 LBS | SYSTOLIC BLOOD PRESSURE: 92 MMHG | BODY MASS INDEX: 32.73 KG/M2 | HEART RATE: 74 BPM

## 2019-06-26 DIAGNOSIS — R42 DIZZINESS: ICD-10-CM

## 2019-06-26 DIAGNOSIS — I10 ESSENTIAL HYPERTENSION: ICD-10-CM

## 2019-06-26 DIAGNOSIS — R01.1 CARDIAC MURMUR: Primary | ICD-10-CM

## 2019-06-26 DIAGNOSIS — E78.5 HYPERLIPIDEMIA LDL GOAL <100: ICD-10-CM

## 2019-06-26 DIAGNOSIS — I77.810 AORTIC ECTASIA, THORACIC (HCC): ICD-10-CM

## 2019-06-26 PROCEDURE — 99214 OFFICE O/P EST MOD 30 MIN: CPT | Performed by: NURSE PRACTITIONER

## 2019-06-26 RX ORDER — HYDROCODONE BITARTRATE AND ACETAMINOPHEN 10; 325 MG/1; MG/1
1 TABLET ORAL AS NEEDED
Refills: 0 | COMMUNITY
Start: 2019-05-13

## 2019-06-26 RX ORDER — DULOXETIN HYDROCHLORIDE 30 MG/1
30 CAPSULE, DELAYED RELEASE ORAL DAILY
Refills: 1 | COMMUNITY
Start: 2019-05-28

## 2019-06-26 RX ORDER — QUETIAPINE FUMARATE 25 MG/1
25 TABLET, FILM COATED ORAL NIGHTLY
Refills: 2 | COMMUNITY
Start: 2019-05-08

## 2019-06-26 RX ORDER — TRAMADOL HYDROCHLORIDE 50 MG/1
50 TABLET ORAL AS NEEDED
Refills: 0 | COMMUNITY
Start: 2019-06-19 | End: 2020-04-14

## 2019-06-26 RX ORDER — ARIPIPRAZOLE 10 MG/1
10 TABLET ORAL DAILY
Refills: 2 | COMMUNITY
Start: 2019-06-19

## 2019-06-26 RX ORDER — CLONIDINE HYDROCHLORIDE 0.1 MG/1
0.1 TABLET ORAL DAILY
Refills: 2 | COMMUNITY
Start: 2019-06-19

## 2019-07-29 ENCOUNTER — HOSPITAL ENCOUNTER (OUTPATIENT)
Dept: CARDIOLOGY | Facility: HOSPITAL | Age: 59
Discharge: HOME | End: 2019-07-29
Admitting: NURSE PRACTITIONER

## 2019-07-29 VITALS — WEIGHT: 221 LBS | HEIGHT: 69 IN | BODY MASS INDEX: 32.73 KG/M2

## 2019-07-29 DIAGNOSIS — R01.1 CARDIAC MURMUR: ICD-10-CM

## 2019-07-29 PROCEDURE — 93306 TTE W/DOPPLER COMPLETE: CPT | Performed by: INTERNAL MEDICINE

## 2019-07-29 PROCEDURE — 93306 TTE W/DOPPLER COMPLETE: CPT

## 2019-08-05 LAB
ASCENDING AORTA: 4.1 CM
BH CV ECHO MEAS - AI DEC SLOPE: 315.5 CM/SEC^2
BH CV ECHO MEAS - AI MAX PG: 66.6 MMHG
BH CV ECHO MEAS - AI MAX VEL: 407.7 CM/SEC
BH CV ECHO MEAS - AI P1/2T: 378.5 MSEC
BH CV ECHO MEAS - AO MAX PG (FULL): 10.7 MMHG
BH CV ECHO MEAS - AO MAX PG: 15.7 MMHG
BH CV ECHO MEAS - AO MEAN PG (FULL): 4.9 MMHG
BH CV ECHO MEAS - AO MEAN PG: 7.6 MMHG
BH CV ECHO MEAS - AO ROOT AREA (BSA CORRECTED): 1.6
BH CV ECHO MEAS - AO ROOT AREA: 8.9 CM^2
BH CV ECHO MEAS - AO ROOT DIAM: 3.4 CM
BH CV ECHO MEAS - AO V2 MAX: 198.1 CM/SEC
BH CV ECHO MEAS - AO V2 MEAN: 126.6 CM/SEC
BH CV ECHO MEAS - AO V2 VTI: 37.4 CM
BH CV ECHO MEAS - ASC AORTA: 4.1 CM
BH CV ECHO MEAS - AVA(I,A): 2.4 CM^2
BH CV ECHO MEAS - AVA(I,D): 2.4 CM^2
BH CV ECHO MEAS - AVA(V,A): 2 CM^2
BH CV ECHO MEAS - AVA(V,D): 2 CM^2
BH CV ECHO MEAS - BSA(HAYCOCK): 2.2 M^2
BH CV ECHO MEAS - BSA: 2.2 M^2
BH CV ECHO MEAS - BZI_BMI: 32.6 KILOGRAMS/M^2
BH CV ECHO MEAS - BZI_METRIC_HEIGHT: 175.3 CM
BH CV ECHO MEAS - BZI_METRIC_WEIGHT: 100.2 KG
BH CV ECHO MEAS - EDV(CUBED): 9.3 ML
BH CV ECHO MEAS - EDV(MOD-SP2): 90 ML
BH CV ECHO MEAS - EDV(MOD-SP4): 97 ML
BH CV ECHO MEAS - EDV(TEICH): 14.5 ML
BH CV ECHO MEAS - EF(CUBED): 44.2 %
BH CV ECHO MEAS - EF(MOD-BP): 63 %
BH CV ECHO MEAS - EF(MOD-SP2): 66.7 %
BH CV ECHO MEAS - EF(MOD-SP4): 59.8 %
BH CV ECHO MEAS - EF(TEICH): 39.1 %
BH CV ECHO MEAS - ESV(CUBED): 5.2 ML
BH CV ECHO MEAS - ESV(MOD-SP2): 30 ML
BH CV ECHO MEAS - ESV(MOD-SP4): 39 ML
BH CV ECHO MEAS - ESV(TEICH): 8.8 ML
BH CV ECHO MEAS - FS: 17.7 %
BH CV ECHO MEAS - IVS/LVPW: 2.5
BH CV ECHO MEAS - IVSD: 2.2 CM
BH CV ECHO MEAS - LAD MAJOR: 4.8 CM
BH CV ECHO MEAS - LAT PEAK E' VEL: 9 CM/SEC
BH CV ECHO MEAS - LATERAL E/E' RATIO: 9.8
BH CV ECHO MEAS - LV DIASTOLIC VOL/BSA (35-75): 45 ML/M^2
BH CV ECHO MEAS - LV IVRT: 0.08 SEC
BH CV ECHO MEAS - LV MASS(C)D: 108.5 GRAMS
BH CV ECHO MEAS - LV MASS(C)DI: 50.3 GRAMS/M^2
BH CV ECHO MEAS - LV MAX PG: 5 MMHG
BH CV ECHO MEAS - LV MEAN PG: 2.7 MMHG
BH CV ECHO MEAS - LV SYSTOLIC VOL/BSA (12-30): 18.1 ML/M^2
BH CV ECHO MEAS - LV V1 MAX: 111.6 CM/SEC
BH CV ECHO MEAS - LV V1 MEAN: 75.3 CM/SEC
BH CV ECHO MEAS - LV V1 VTI: 26 CM
BH CV ECHO MEAS - LVIDD: 2.1 CM
BH CV ECHO MEAS - LVIDS: 1.7 CM
BH CV ECHO MEAS - LVLD AP2: 8.5 CM
BH CV ECHO MEAS - LVLD AP4: 8.6 CM
BH CV ECHO MEAS - LVLS AP2: 6.4 CM
BH CV ECHO MEAS - LVLS AP4: 6.9 CM
BH CV ECHO MEAS - LVOT AREA (M): 3.5 CM^2
BH CV ECHO MEAS - LVOT AREA: 3.5 CM^2
BH CV ECHO MEAS - LVOT DIAM: 2.1 CM
BH CV ECHO MEAS - LVPWD: 0.89 CM
BH CV ECHO MEAS - MED PEAK E' VEL: 7.1 CM/SEC
BH CV ECHO MEAS - MEDIAL E/E' RATIO: 12.3
BH CV ECHO MEAS - MPA AREA: 8.2 CM^2
BH CV ECHO MEAS - MPA DIAM: 3.2 CM
BH CV ECHO MEAS - MV A MAX VEL: 100.2 CM/SEC
BH CV ECHO MEAS - MV DEC TIME: 0.32 SEC
BH CV ECHO MEAS - MV E MAX VEL: 89.3 CM/SEC
BH CV ECHO MEAS - MV E/A: 0.89
BH CV ECHO MEAS - PA ACC SLOPE: 1092 CM/SEC^2
BH CV ECHO MEAS - PA ACC TIME: 0.09 SEC
BH CV ECHO MEAS - PA PR(ACCEL): 37 MMHG
BH CV ECHO MEAS - PULM A REVS VEL: 44.4 CM/SEC
BH CV ECHO MEAS - PULM DIAS VEL: 37.5 CM/SEC
BH CV ECHO MEAS - PULM S/D: 1.7
BH CV ECHO MEAS - PULM SYS VEL: 64.7 CM/SEC
BH CV ECHO MEAS - RAP SYSTOLE: 3 MMHG
BH CV ECHO MEAS - SI(AO): 154.2 ML/M^2
BH CV ECHO MEAS - SI(CUBED): 1.9 ML/M^2
BH CV ECHO MEAS - SI(LVOT): 42.2 ML/M^2
BH CV ECHO MEAS - SI(MOD-SP2): 27.8 ML/M^2
BH CV ECHO MEAS - SI(MOD-SP4): 26.9 ML/M^2
BH CV ECHO MEAS - SI(TEICH): 2.6 ML/M^2
BH CV ECHO MEAS - SV(AO): 332.3 ML
BH CV ECHO MEAS - SV(CUBED): 4.1 ML
BH CV ECHO MEAS - SV(LVOT): 90.9 ML
BH CV ECHO MEAS - SV(MOD-SP2): 60 ML
BH CV ECHO MEAS - SV(MOD-SP4): 58 ML
BH CV ECHO MEAS - SV(TEICH): 5.7 ML
BH CV ECHO MEAS - TAPSE (>1.6): 2.3 CM2
BH CV ECHO MEASUREMENTS AVERAGE E/E' RATIO: 11.09
BH CV VAS BP LEFT ARM: NORMAL MMHG
BH CV XLRA - RV BASE: 3.7 CM
BH CV XLRA - RV LENGTH: 8.1 CM
BH CV XLRA - RV MID: 3.3 CM
BH CV XLRA - TDI S': 14 CM/SEC
LEFT ATRIUM VOLUME INDEX: 24.6 ML/M^2
LEFT ATRIUM VOLUME: 53 ML
LV EF 2D ECHO EST: 60 %

## 2019-08-08 ENCOUNTER — TELEPHONE (OUTPATIENT)
Dept: CARDIOLOGY | Facility: CLINIC | Age: 59
End: 2019-08-08

## 2019-08-08 NOTE — TELEPHONE ENCOUNTER
Discussed recent echo results- no source of dizziness-     PT states that she has not had any dizziness since she was here in June- her BP is better- running 110/70's- she will call me if she has further episodes of dizziness, call me and we will consider a monitor-     PT verbalized understanding-

## 2019-08-19 ENCOUNTER — OFFICE VISIT (OUTPATIENT)
Dept: NEUROLOGY | Facility: CLINIC | Age: 59
End: 2019-08-19

## 2019-08-19 VITALS
BODY MASS INDEX: 34.21 KG/M2 | HEIGHT: 69 IN | HEART RATE: 98 BPM | WEIGHT: 231 LBS | DIASTOLIC BLOOD PRESSURE: 78 MMHG | SYSTOLIC BLOOD PRESSURE: 114 MMHG | OXYGEN SATURATION: 94 %

## 2019-08-19 DIAGNOSIS — M54.2 NECK PAIN: Primary | ICD-10-CM

## 2019-08-19 DIAGNOSIS — R26.89 POOR BALANCE: ICD-10-CM

## 2019-08-19 DIAGNOSIS — G43.009 MIGRAINE WITHOUT AURA AND WITHOUT STATUS MIGRAINOSUS, NOT INTRACTABLE: ICD-10-CM

## 2019-08-19 PROCEDURE — 99214 OFFICE O/P EST MOD 30 MIN: CPT | Performed by: NURSE PRACTITIONER

## 2019-08-19 NOTE — PROGRESS NOTES
Subjective:     Patient ID: Katelyn Wilson is a 59 y.o. female.    CC:   Chief Complaint   Patient presents with   • Migraine       HPI:   History of Present Illness     Ms. Wilson is a 59-year-old patient of Dr. Andrade here for annual follow-up.  She has been seeing Dr. Andrade for several years for migraines, fibromyalgia,, neck pain and problems with balance.  When she was seen here last in April 2018 Dr. Andrade did attempt to get an MRI of the brain due to problems with balance as well as an MRI of the C-spine due to balance issues complains of neck pain and left arm radiculopathy.  Apparently MRIs were denied.  She has not done physical therapy.  She would like to try to reorder both MRIs today.  She states her migraines are stable, she is currently having approximately 15 headaches per month however she states they respond well to tramadol.  She has been on Topamax, amitriptyline, Inderal in the past.  She is currently on gabapentin and Cymbalta.  She has no new symptoms to report today.  She has had no stroke symptoms.  The following portions of the patient's history were reviewed and updated as appropriate: allergies, current medications, past family history, past medical history, past social history, past surgical history and problem list.    Past Medical History:   Diagnosis Date   • Ataxia    • Calculus of distal right ureter    • Cardiac murmur 6/26/2019   • Chest pain     [facility] feb 2003-near normal coronaries, Cardiolite 2007-Exercised for 7 minutes and 22 seconds-no evidence of ischemia, LVEF 84%, ER presentation on 3/17/16 Dr. 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    • Hyperlipidemia    • Hypertension    • Muscle weakness (generalized)    • Neck pain    • Obesity    • Osteoarthritis    • Stroke (CMS/HCC)     MRI of brain  revealing Microvascular changes and evidence of 3mm, left basal fanglia lacunar infarct with initiation of Plavix   • Syncope     neg Tilt Table 2011   • Tendonitis    • Ureteral stone        Past Surgical History:   Procedure Laterality Date   • APPENDECTOMY     • CHOLECYSTECTOMY     • HYSTERECTOMY     • ROTATOR CUFF REPAIR Left        Social History     Socioeconomic History   • Marital status:      Spouse name: Not on file   • Number of children: Not on file   • Years of education: Not on file   • Highest education level: Not on file   Tobacco Use   • Smoking status: Former Smoker     Types: Cigarettes     Last attempt to quit:      Years since quittin.6   • Smokeless tobacco: Never Used   Substance and Sexual Activity   • Alcohol use: No   • Drug use: No   • Sexual activity: Defer       Family History   Problem Relation Age of Onset   • Colon cancer Mother    • Diabetes Mother    • Hypertension Mother    • Colon cancer Father    • Hypertension Father    • Prostate cancer Father    • Diabetes Maternal Grandmother    • Hypertension Maternal Grandmother    • Diabetes Maternal Grandfather    • Prostate cancer Maternal Grandfather    • Hypertension Paternal Grandmother    • Lung cancer Paternal Grandfather    • Prostate cancer Paternal Grandfather         Review of Systems   Constitutional: Negative.    Eyes: Negative.    Respiratory: Negative.    Cardiovascular: Negative.    Gastrointestinal: Negative.    Endocrine: Negative.    Genitourinary: Negative.    Musculoskeletal: Positive for gait problem, neck pain and neck stiffness.   Skin: Negative.    Allergic/Immunologic: Negative.    Neurological: Positive for numbness and headaches. Negative for dizziness, tremors, seizures, syncope, facial asymmetry,  speech difficulty, weakness and light-headedness.   Hematological: Negative.    Psychiatric/Behavioral: Negative.         Objective:    Neurologic Exam     Mental Status   Oriented to person, place, and time.   Attention: normal. Concentration: normal.   Speech: speech is normal   Level of consciousness: alert  Knowledge: consistent with education.   Able to read. Able to write. Normal comprehension.     Cranial Nerves   Cranial nerves II through XII intact.     CN II   Visual fields full to confrontation.   Right visual field deficit: none  Left visual field deficit: none     CN III, IV, VI   Pupils are equal, round, and reactive to light.  Extraocular motions are normal.   Right pupil: Shape: regular. Reactivity: brisk. Consensual response: intact. Accommodation: intact.   Left pupil: Shape: regular. Reactivity: brisk. Consensual response: intact. Accommodation: intact.   CN III: no CN III palsy  CN VI: no CN VI palsy  Nystagmus: none   Upgaze: normal  Downgaze: normal    CN V   Facial sensation intact.     CN VII   Facial expression full, symmetric.     CN VIII   CN VIII normal.     CN IX, X   CN IX normal.   CN X normal.     CN XI   CN XI normal.     CN XII   CN XII normal.     Motor Exam   Muscle bulk: normal  Overall muscle tone: normal  Right arm tone: normal  Left arm tone: normal  Right arm pronator drift: absent  Left arm pronator drift: absent  Right leg tone: normal  Left leg tone: normal    Strength   Strength 5/5 throughout.     Sensory Exam   Light touch normal.     Gait, Coordination, and Reflexes     Gait  Gait: (Wide-based gait)    Coordination   Romberg: negative  Finger to nose coordination: normal  Heel to shin coordination: normal  Tandem walking coordination: abnormal    Tremor   Resting tremor: absent  Intention tremor: absent  Action tremor: absent    Reflexes   Reflexes 2+ except as noted.   Right plantar: normal  Left plantar: normal  Right Buchanan: absent  Left Buchanan:  absent      Physical Exam   Constitutional: She is oriented to person, place, and time. She appears well-developed and well-nourished. No distress.   HENT:   Head: Normocephalic and atraumatic.   Eyes: Conjunctivae and EOM are normal. Pupils are equal, round, and reactive to light. No scleral icterus.   Neck: Normal range of motion. Neck supple.   Pulmonary/Chest: Effort normal. No respiratory distress.   Neurological: She is alert and oriented to person, place, and time. She has normal strength. She has an abnormal Tandem Gait Test. She has a normal Finger-Nose-Finger Test, a normal Heel to Candelaria Test and a normal Romberg Test.   Skin: Skin is warm.   Psychiatric: She has a normal mood and affect. Her speech is normal and behavior is normal. Judgment and thought content normal.   Vitals reviewed.      Assessment/Plan:       Katelyn was seen today for migraine.    Diagnoses and all orders for this visit:    Neck pain  -     Ambulatory Referral to Physical Therapy Evaluate and treat  -     MRI Cervical Spine Without Contrast    Poor balance  -     Ambulatory Referral to Physical Therapy Evaluate and treat  -     MRI Brain Without Contrast  -     MRI Cervical Spine Without Contrast    Migraine without aura and without status migrainosus, not intractable  -     MRI Brain Without Contrast     reports that she is doing well in regards to migraines.  She does complain of continued problems with balance, she is had no falls recently.  She also complains of neck pain with radiation of pain and tingling in the left fingertips.  MRI was attempted one year ago however she reports insurance denied this.  She has not tried physical therapy.  I reordered physical therapy and encouraged her to start.  MRI of the C-spine ordered to rule out myelopathy, cord compression.  MRI of the brain ordered to rule out tumor lesion or ventriculomegaly  We also did discuss further management of her migraines, she is currently having 15  "headaches per month which she reports is \"stable\".  She has been on Topamax, Inderal and amitriptyline in the past and was intolerant of these medicines.  I have offered her Botox which she declines today, should she decide she would like to try Botox therapy she will let me know.  Reviewed medications, potential side effects and signs and symptoms to report. Discussed risk versus benefits of treatment plan with patient and/or family-including medications, labs and radiology that may be ordered. Addressed questions and concerns during visit. Patient and/or family verbalized understanding and agree with plan.  We reviewed possible headache triggers, stress relief techniques, and alternative treatments for headaches. The patient was in understanding and all questions were addressed. We reviewed the diagnosis and treatment plan and medication side effect profile. The patient will follow up as scheduled.  EMR Dragon/Transcription Disclaimer:  Much of this encounter note is an electronic transcription of spoken language to printed text. Electronic transcription of spoken language may permit erroneous words or phrases to be inadvertently transcribed. Although I have reviewed the note for such errors, some may still exist in this documentation.             Marissa Nazario, APRN  8/19/2019        "

## 2019-09-04 ENCOUNTER — APPOINTMENT (OUTPATIENT)
Dept: MRI IMAGING | Facility: HOSPITAL | Age: 59
End: 2019-09-04

## 2019-09-12 ENCOUNTER — HOSPITAL ENCOUNTER (OUTPATIENT)
Dept: MRI IMAGING | Facility: HOSPITAL | Age: 59
Discharge: HOME | End: 2019-09-12
Admitting: NURSE PRACTITIONER

## 2019-09-12 PROCEDURE — 70551 MRI BRAIN STEM W/O DYE: CPT

## 2019-09-18 ENCOUNTER — TELEPHONE (OUTPATIENT)
Dept: NEUROLOGY | Facility: CLINIC | Age: 59
End: 2019-09-18

## 2019-09-18 NOTE — TELEPHONE ENCOUNTER
Please let her know this.  MRI brain read as stable from previous by radiologist  Does she want to start PT for neck pain?  Keep follow up

## 2019-09-18 NOTE — TELEPHONE ENCOUNTER
Pt is informed.  Pt's insurance requires her to do physical therapy prior to considering authorization.

## 2019-09-18 NOTE — TELEPHONE ENCOUNTER
----- Message from OTTO Perry sent at 9/13/2019 11:20 PM EDT -----  Please let her know her MRI read as stable from previous  Has she had c spine MRI?

## 2019-09-19 NOTE — TELEPHONE ENCOUNTER
Pt is currently doing physical therapy.  We will discuss progress at f/u to determine if we still need to do MRI C SPINE.

## 2019-10-23 ENCOUNTER — HOSPITAL ENCOUNTER (OUTPATIENT)
Dept: ULTRASOUND IMAGING | Facility: HOSPITAL | Age: 59
Discharge: HOME | End: 2019-10-23
Payer: COMMERCIAL

## 2019-10-23 DIAGNOSIS — R09.89 ABNORMAL FOOT PULSE: ICD-10-CM

## 2019-10-23 PROCEDURE — 93925 LOWER EXTREMITY STUDY: CPT

## 2020-04-14 ENCOUNTER — OFFICE VISIT (OUTPATIENT)
Dept: CARDIOLOGY | Facility: CLINIC | Age: 60
End: 2020-04-14

## 2020-04-14 VITALS
SYSTOLIC BLOOD PRESSURE: 133 MMHG | HEART RATE: 80 BPM | HEIGHT: 69 IN | BODY MASS INDEX: 33.03 KG/M2 | DIASTOLIC BLOOD PRESSURE: 76 MMHG | WEIGHT: 223 LBS

## 2020-04-14 DIAGNOSIS — I77.810 AORTIC ECTASIA, THORACIC (HCC): Primary | ICD-10-CM

## 2020-04-14 DIAGNOSIS — E78.5 HYPERLIPIDEMIA LDL GOAL <100: ICD-10-CM

## 2020-04-14 DIAGNOSIS — R55 SYNCOPE, UNSPECIFIED SYNCOPE TYPE: ICD-10-CM

## 2020-04-14 DIAGNOSIS — I10 ESSENTIAL HYPERTENSION: ICD-10-CM

## 2020-04-14 PROCEDURE — 99213 OFFICE O/P EST LOW 20 MIN: CPT | Performed by: NURSE PRACTITIONER

## 2020-04-14 RX ORDER — ERGOCALCIFEROL 1.25 MG/1
50000 CAPSULE ORAL WEEKLY
COMMUNITY
End: 2023-01-17

## 2020-04-14 NOTE — PROGRESS NOTES
[facility] Cardiology    Patient ID: Katelyn Wilson is a 59 y.o. female.  : 1960   Contact: [phone]    Encounter date: 2020    PCP: Dina Hsu APRN      Chief complaint: Chest pain, Thoracic aneurysm, syncope, HTN, HLD    PROBLEM LIST:  1.  Chest pain with mixed features of angina pectoris:              A.  [facility], [place], 2003:  Revealing near normal coronaries              B.  Cardiolite GXT, 07:  Exercise for 7 minutes and 22 seconds reaching target heart rate with no chest pain or severe dyspnea and no evidence of ischemia.  Peak blood pressure 160/90 and EF of 84%              C.  Normal echocardiogram 2010              D.  Normal dobutamine stress echo, 2010              E.  [facility], also, 3/17/16: Near normal coronary arteries, no evidence of hemodynamically significant coronary artery disease, left ejection fraction 65%, incidental aneurysm noted with CT follow-up recommended   F. Echo 19: EF 60%, sclerotic aortic valve without stenosis, trace AI, trace MR, trace to mild TR.   2.  Thoracic aortic aneurysm              A.  CT angiogram of chest 3/17/16: Mild ectasia of the ascending aorta measuring 3.4 x 3.8 cm, no acute chest pathology              B.  CT of chest, 18: Ascending aorta is upper limits of normal diameter, 4 cm   3.  Syncope              A.  Normal EEG, 11              B.  Normal Holter, 11              C.  Negative tilt table, 11  4.  Hypertension  5.  Dyslipidemia  6.  Obesity  7.  Fibromyalgia  8.  Osteoarthritis  9.  Borderline diabetes mellitus       Allergies   Allergen Reactions   • Atorvastatin    • Codeine Other (See Comments)     Tachycardia   • Diltiazem Other (See Comments)     Headache   • Estrogens    • Oxycodone    • Propoxyphene    • Diovan [Valsartan] Rash       Current Medications:    Current Outpatient Medications:   •  albuterol (PROAIR HFA) 108 (90 BASE) MCG/ACT inhaler,  ProAir  (90 Base) MCG/ACT Inhalation Aerosol Solution; Patient Sig: ProAir  (90 Base) MCG/ACT Inhalation Aerosol Solution ; 8; 0; 20-Feb-2014; Active, Disp: , Rfl:   •  ARIPiprazole (ABILIFY) 10 MG tablet, Take 10 mg by mouth Daily., Disp: , Rfl: 2  •  CloNIDine (CATAPRES) 0.1 MG tablet, Take 0.1 mg by mouth Daily., Disp: , Rfl: 2  •  clopidogrel (PLAVIX) 75 MG tablet, Take 75 mg by mouth Daily., Disp: , Rfl:   •  cyanocobalamin 1000 MCG/ML injection, 1 (One) Time Per Week., Disp: , Rfl:   •  Dapagliflozin Propanediol (FARXIGA) 10 MG tablet, Take 10 mg by mouth Daily., Disp: , Rfl:   •  desloratadine (CLARINEX) 5 MG tablet, Take 5 mg by mouth Daily., Disp: , Rfl:   •  DULoxetine (CYMBALTA) 30 MG capsule, Take 30 mg by mouth Daily., Disp: , Rfl: 1  •  DULoxetine (CYMBALTA) 60 MG capsule, Take 60 mg by mouth Daily., Disp: , Rfl:   •  ezetimibe (ZETIA) 10 MG tablet, Take 10 mg by mouth Daily., Disp: , Rfl:   •  gabapentin (NEURONTIN) 600 MG tablet, Take 600 mg by mouth 6 (Six) Times a Day. 2 tabs bid, Disp: , Rfl:   •  HYDROcodone-acetaminophen (NORCO)  MG per tablet, Take 1 tablet by mouth As Needed., Disp: , Rfl: 0  •  lisinopril (PRINIVIL,ZESTRIL) 10 MG tablet, Take 10 mg by mouth Daily., Disp: , Rfl:   •  metFORMIN (GLUCOPHAGE) 1000 MG tablet, Take 1,000 mg by mouth 2 (Two) Times a Day With Meals., Disp: , Rfl:   •  metoprolol tartrate (LOPRESSOR) 25 MG tablet, Take 25 mg by mouth daily. 1/2 tablet daily, Disp: , Rfl:   •  montelukast (SINGULAIR) 10 MG tablet, Take 10 mg by mouth Every Night., Disp: , Rfl:   •  nitroglycerin (NITROSTAT) 0.4 MG SL tablet, Place 0.4 mg under the tongue every 5 (five) minutes as needed for chest pain. Take no more than 3 doses in 15 minutes., Disp: , Rfl:   •  olopatadine (PATADAY) 0.2 % solution ophthalmic solution, 1 drop Daily., Disp: , Rfl:   •  QUEtiapine (SEROquel) 25 MG tablet, Take 25 mg by mouth Every Night., Disp: , Rfl: 2  •  rosuvastatin (CRESTOR) 10 MG  "tablet, Take 10 mg by mouth Daily., Disp: , Rfl:   •  SITagliptin (JANUVIA) 100 MG tablet, Take 100 mg by mouth Daily., Disp: , Rfl:   •  vitamin D (ERGOCALCIFEROL) 1.25 MG (41415 UT) capsule capsule, Take 50,000 Units by mouth 1 (One) Time Per Week., Disp: , Rfl:     HPI    Katelyn Wilson is a 59 y.o. female who presents today for follow up on CAD, HTN, HLD, thoracic aneursym. Since last visit, patient has done well from a cardiovascular standpoint. Denies CP, SOB, LH, dizziness, near syncope, syncope, PND, orthopnea and GAMAL. BP has been well controlled. Usually <130/80. She is compliant with all medications. Her last FLP was 10/2019 with acceptable values.        The following portions of the patient's history were reviewed and updated as appropriate: allergies, current medications and problem list.    Pertinent positives as listed in the HPI.  All other systems reviewed are negative.       Vitals:    04/14/20 1332   BP: 133/76   BP Location: Left arm   Patient Position: Sitting   Pulse: 80   Weight: 101 kg (223 lb)   Height: 175.3 cm (69\")       Physical Exam:  AAOx3, normal mood and affect.     Diagnostic Data:  Lab Results   Component Value Date    GLUCOSE 150 (H) 09/14/2018    BUN 10 09/14/2018    CREATININE 0.77 09/14/2018    EGFRIFNONA 77 09/14/2018    BCR 13.0 09/14/2018    K 4.1 09/14/2018    CO2 28.0 09/14/2018    CALCIUM 9.6 09/14/2018    ALBUMIN 4.68 09/14/2018    AST 54 (H) 09/14/2018    ALT 53 (H) 09/14/2018     Lab Results   Component Value Date    GLUCOSE 150 (H) 09/14/2018    CALCIUM 9.6 09/14/2018     09/14/2018    K 4.1 09/14/2018    CO2 28.0 09/14/2018    CL 99 09/14/2018    BUN 10 09/14/2018    CREATININE 0.77 09/14/2018    EGFRIFNONA 77 09/14/2018    BCR 13.0 09/14/2018    ANIONGAP 10.0 09/14/2018     Lab Results   Component Value Date    CHOL TNP 01/04/2017    CHLPL 199 03/17/2016    TRIG 606 (H) 03/17/2016    HDL 29 (L) 03/17/2016     (H) 03/17/2016     Lab Results "   Component Value Date    WBC 6.68 09/14/2018    HGB 15.8 (H) 09/14/2018    HCT 48.0 (H) 09/14/2018    MCV 90.7 09/14/2018     09/14/2018     No results found for: TSH    Procedures          ASSESSMENT:    ICD-10-CM ICD-9-CM   1. Aortic ectasia, thoracic (CMS/[facility]) I77.810 447.71   2. Hyperlipidemia LDL goal <100 E78.5 272.4   3. Essential hypertension I10 401.9   4. Syncope, unspecified syncope type R55 780.2     Lab results found above were reviewed with the patient.      PLAN:  1. Continue Clonidine, lopressor for HTN  2. Continue Crestor, Zetia for HLD  3. Continue all other current medications.  4. F/up in 12 months, sooner if needed.    Electronically signed by OTTO Duran, 04/14/20, 1:48 PM.    This patient has consented to a telehealth visit via televisit. The visit was scheduled as a phone visit to comply with patient safety concerns in accordance with CDC recommendations.  All vitals recorded within this visit are reported by the patient.  I spent  30 minutes in total including but not limited to the 10 minutes spent in direct conversation with this patient.

## 2020-07-23 ENCOUNTER — HOSPITAL ENCOUNTER (OUTPATIENT)
Dept: MAMMOGRAPHY | Facility: HOSPITAL | Age: 60
Discharge: HOME | End: 2020-07-23
Payer: COMMERCIAL

## 2020-07-23 PROCEDURE — 77067 SCR MAMMO BI INCL CAD: CPT

## 2020-08-12 ENCOUNTER — PREP FOR SURGERY (OUTPATIENT)
Dept: OTHER | Facility: HOSPITAL | Age: 60
End: 2020-08-12

## 2020-08-12 DIAGNOSIS — Z01.818 PREOP TESTING: Primary | ICD-10-CM

## 2020-08-12 DIAGNOSIS — Z12.11 COLON CANCER SCREENING: Primary | ICD-10-CM

## 2020-08-12 RX ORDER — POLYETHYLENE GLYCOL 3350 17 G/17G
POWDER, FOR SOLUTION ORAL
Qty: 238 G | Refills: 0 | Status: SHIPPED | OUTPATIENT
Start: 2020-08-12 | End: 2021-03-15

## 2020-08-12 RX ORDER — BISACODYL 5 MG/1
TABLET, DELAYED RELEASE ORAL
Qty: 4 TABLET | Refills: 0 | Status: SHIPPED | OUTPATIENT
Start: 2020-08-12 | End: 2020-08-19

## 2020-08-13 ENCOUNTER — HOSPITAL ENCOUNTER (OUTPATIENT)
Dept: ULTRASOUND IMAGING | Facility: HOSPITAL | Age: 60
Discharge: HOME | End: 2020-08-13
Payer: COMMERCIAL

## 2020-08-13 ENCOUNTER — HOSPITAL ENCOUNTER (OUTPATIENT)
Dept: MAMMOGRAPHY | Facility: HOSPITAL | Age: 60
Discharge: HOME | End: 2020-08-13
Payer: COMMERCIAL

## 2020-08-13 PROCEDURE — 77065 DX MAMMO INCL CAD UNI: CPT

## 2020-08-13 PROCEDURE — 76642 ULTRASOUND BREAST LIMITED: CPT

## 2020-08-19 ENCOUNTER — LAB (OUTPATIENT)
Dept: LAB | Facility: HOSPITAL | Age: 60
End: 2020-08-19

## 2020-08-19 ENCOUNTER — OFFICE VISIT (OUTPATIENT)
Dept: NEUROLOGY | Facility: CLINIC | Age: 60
End: 2020-08-19

## 2020-08-19 VITALS
WEIGHT: 233 LBS | HEART RATE: 77 BPM | OXYGEN SATURATION: 95 % | BODY MASS INDEX: 34.51 KG/M2 | SYSTOLIC BLOOD PRESSURE: 110 MMHG | HEIGHT: 69 IN | TEMPERATURE: 96.4 F | DIASTOLIC BLOOD PRESSURE: 80 MMHG

## 2020-08-19 DIAGNOSIS — R41.3 MEMORY LOSS: Primary | ICD-10-CM

## 2020-08-19 DIAGNOSIS — M54.2 NECK PAIN: ICD-10-CM

## 2020-08-19 DIAGNOSIS — R26.89 POOR BALANCE: ICD-10-CM

## 2020-08-19 LAB
FOLATE SERPL-MCNC: >20 NG/ML (ref 4.78–24.2)
RPR SER QL: NORMAL
VIT B12 BLD-MCNC: 669 PG/ML (ref 211–946)

## 2020-08-19 PROCEDURE — 36415 COLL VENOUS BLD VENIPUNCTURE: CPT | Performed by: NURSE PRACTITIONER

## 2020-08-19 PROCEDURE — 86592 SYPHILIS TEST NON-TREP QUAL: CPT | Performed by: NURSE PRACTITIONER

## 2020-08-19 PROCEDURE — 83921 ORGANIC ACID SINGLE QUANT: CPT | Performed by: NURSE PRACTITIONER

## 2020-08-19 PROCEDURE — 99213 OFFICE O/P EST LOW 20 MIN: CPT | Performed by: NURSE PRACTITIONER

## 2020-08-19 PROCEDURE — 82607 VITAMIN B-12: CPT | Performed by: NURSE PRACTITIONER

## 2020-08-19 PROCEDURE — 82746 ASSAY OF FOLIC ACID SERUM: CPT | Performed by: NURSE PRACTITIONER

## 2020-08-19 NOTE — PROGRESS NOTES
Subjective:     Patient ID: Katelyn Wilson is a 60 y.o. female.    CC:   Chief Complaint   Patient presents with   • Neck Pain       HPI:   History of Present Illness     Ms. Wilson is a 59-year-old patient of Dr. Andrade here for annual follow-up.      She has been seeing Dr. Andrade for several years for migraines, fibromyalgia,, neck pain and problems with balance.  When she was seen here last in April 2018 Dr. Andrade did attempt to get an MRI of the brain due to problems with balance as well as an MRI of the C-spine due to balance issues complains of neck pain and left arm radiculopathy.  Apparently MRIs were denied.  She ha had not done physical therapy.   At last visit she asked me to reorder MRI of the brain and C-spine, I did also put in an order for physical therapy.  She did not go through physical therapy, insurance again denied MRI of the C-spine as she had not completed physical therapy.  They did however get MRI of the brain and this was read as stable.  Today she tells me that she is doing about the same.  She has had no falls, her headaches are controlled and rare.  She has recently been started on B12 injections, she completed weekly injections for 6 weeks and is now on monthly injections.  She does tell me she is having trouble with her memory, apparently this is been going on for a couple years but she has never mentioned this to us.  She is having trouble with short-term recall  She struggles with depression and anxiety, she is currently treated with Abilify and Cymbalta through primary care as well as Seroquel  Blood sugars are doing okay  She is had no confusional episodes or stroke symptoms by report   The following portions of the patient's history were reviewed and updated as appropriate: allergies, current medications, past family history, past medical history, past social history, past surgical history and problem list.    Past Medical History:   Diagnosis Date   • Ataxia    •  Calculus of distal right ureter    • Cardiac murmur 2019   • Chest pain     [facility] 2003-near normal coronaries, Cardiolite -Exercised for 7 minutes and 22 seconds-no evidence of ischemia, LVEF 84%, ER presentation on 3/17/16 Dr. 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    • Hyperlipidemia    • Hypertension    • Muscle weakness (generalized)    • Neck pain    • Obesity    • Osteoarthritis    • Stroke (CMS/HCC)     MRI of brain  revealing Microvascular changes and evidence of 3mm, left basal fanglia lacunar infarct with initiation of Plavix   • Syncope     neg Tilt Table 2011   • Tendonitis    • Ureteral stone        Past Surgical History:   Procedure Laterality Date   • APPENDECTOMY     • CHOLECYSTECTOMY     • HYSTERECTOMY     • ROTATOR CUFF REPAIR Left        Social History     Socioeconomic History   • Marital status:      Spouse name: Not on file   • Number of children: Not on file   • Years of education: Not on file   • Highest education level: Not on file   Tobacco Use   • Smoking status: Former Smoker     Types: Cigarettes     Last attempt to quit:      Years since quittin.6   • Smokeless tobacco: Never Used   Substance and Sexual Activity   • Alcohol use: No   • Drug use: No   • Sexual activity: Defer       Family History   Problem Relation Age of Onset   • Colon cancer Mother    • Diabetes Mother    • Hypertension Mother    • Colon cancer Father    • Hypertension Father    • Prostate cancer Father    • Diabetes Maternal Grandmother    • Hypertension Maternal Grandmother    • Diabetes Maternal Grandfather    • Prostate cancer  "Maternal Grandfather    • Hypertension Paternal Grandmother    • Lung cancer Paternal Grandfather    • Prostate cancer Paternal Grandfather         Review of Systems   Constitutional: Negative.    HENT: Negative.    Eyes: Negative.    Respiratory: Negative.    Cardiovascular: Negative.    Gastrointestinal: Negative.    Endocrine: Negative.    Genitourinary: Negative.    Musculoskeletal: Positive for neck pain.        Feels off balance at times   Skin: Negative.    Allergic/Immunologic: Negative.    Neurological: Negative.  Negative for dizziness, tremors, seizures, syncope, facial asymmetry, speech difficulty, weakness, light-headedness, numbness and headaches.   Hematological: Negative.    Psychiatric/Behavioral: Negative.         Objective:  /80   Pulse 77   Temp 96.4 °F (35.8 °C)   Ht 175.3 cm (69\")   Wt 106 kg (233 lb)   SpO2 95%   BMI 34.41 kg/m²     Neurologic Exam     Mental Status   Oriented to person, place, and time.   Attention: normal. Concentration: normal.   Speech: speech is normal   Level of consciousness: alert  Knowledge: consistent with education.   Able to read. Able to write. Normal comprehension.     Cranial Nerves   Cranial nerves II through XII intact.     CN II   Visual fields full to confrontation.   Right visual field deficit: none  Left visual field deficit: none     CN III, IV, VI   Pupils are equal, round, and reactive to light.  Extraocular motions are normal.   Right pupil: Shape: regular. Reactivity: brisk. Consensual response: intact. Accommodation: intact.   Left pupil: Shape: regular. Reactivity: brisk. Consensual response: intact. Accommodation: intact.   CN III: no CN III palsy  CN VI: no CN VI palsy  Nystagmus: none   Upgaze: normal  Downgaze: normal    CN V   Facial sensation intact.     CN VII   Facial expression full, symmetric.     CN VIII   CN VIII normal.     CN IX, X   CN IX normal.   CN X normal.     CN XI   CN XI normal.     CN XII   CN XII normal. "     Motor Exam   Muscle bulk: normal  Overall muscle tone: normal  Right arm tone: normal  Left arm tone: normal  Right arm pronator drift: absent  Left arm pronator drift: absent  Right leg tone: normal  Left leg tone: normal    Strength   Strength 5/5 throughout.     Sensory Exam   Light touch normal.     Gait, Coordination, and Reflexes     Gait  Gait: normal    Coordination   Romberg: negative  Finger to nose coordination: normal  Heel to shin coordination: normal  Tandem walking coordination: normal    Tremor   Resting tremor: absent  Intention tremor: absent  Action tremor: absent    Reflexes   Right Buchanan: absent  Left Buchanan: absentDTRs 1+ throughout       Physical Exam   Constitutional: She is oriented to person, place, and time. She appears well-developed and well-nourished.   Obese white female   HENT:   Head: Normocephalic and atraumatic.   Eyes: Pupils are equal, round, and reactive to light. Conjunctivae and EOM are normal. No scleral icterus.   Neck: Normal range of motion. Neck supple.   Pulmonary/Chest: Effort normal. No respiratory distress.   Neurological: She is alert and oriented to person, place, and time. She has normal strength. She has a normal Finger-Nose-Finger Test, a normal Heel to Shin Test, a normal Romberg Test and a normal Tandem Gait Test. Gait normal.   Skin: Skin is warm. Capillary refill takes less than 2 seconds.   Psychiatric: She has a normal mood and affect. Her speech is normal and behavior is normal. Judgment and thought content normal.   Vitals reviewed.      Assessment/Plan:       Katelyn was seen today for neck pain.    Diagnoses and all orders for this visit:    Memory loss  -     Ambulatory Referral to Neuropsychology  -     Methylmalonic Acid, Serum; Future  -     Vitamin B12; Future  -     Folate; Future  -     RPR; Future  -     Methylmalonic Acid, Serum  -     Vitamin B12  -     Folate  -     RPR    Poor balance  -     Ambulatory Referral to Physical Therapy  Evaluate and treat    Neck pain  -     Ambulatory Referral to Physical Therapy Evaluate and treat    MMSE score today 29 out of 30  We will check labs as noted above, she does feel like her depression and anxiety may be contributing to her memory issues as she is preoccupied by stressors, we will set her up for formal neuropsych testing  She would like me to reorder physical therapy, she will complete at least 6 weeks and we will see her back here in the office in order MRI at that time if indicated           Reviewed medications, potential side effects and signs and symptoms to report. Discussed risk versus benefits of treatment plan with patient and/or family-including medications, labs and radiology that may be ordered. Addressed questions and concerns during visit. Patient and/or family verbalized understanding and agree with plan.      Marissa Nazario, APRN  8/19/2020

## 2020-08-22 LAB
Lab: NORMAL
METHYLMALONATE SERPL-SCNC: 179 NMOL/L (ref 0–378)

## 2020-09-09 ENCOUNTER — HOSPITAL ENCOUNTER (OUTPATIENT)
Facility: HOSPITAL | Age: 60
Discharge: HOME | End: 2020-09-09
Payer: COMMERCIAL

## 2020-09-09 LAB — SARS-COV-2, NAAT: NOT DETECTED

## 2020-09-09 PROCEDURE — U0002 COVID-19 LAB TEST NON-CDC: HCPCS

## 2020-09-10 ENCOUNTER — OUTSIDE FACILITY SERVICE (OUTPATIENT)
Dept: SURGERY | Facility: CLINIC | Age: 60
End: 2020-09-10

## 2020-09-10 ENCOUNTER — ANESTHESIA EVENT (OUTPATIENT)
Dept: ENDOSCOPY | Facility: HOSPITAL | Age: 60
End: 2020-09-10
Payer: COMMERCIAL

## 2020-09-10 ENCOUNTER — ANESTHESIA (OUTPATIENT)
Dept: ENDOSCOPY | Facility: HOSPITAL | Age: 60
End: 2020-09-10
Payer: COMMERCIAL

## 2020-09-10 ENCOUNTER — HOSPITAL ENCOUNTER (OUTPATIENT)
Facility: HOSPITAL | Age: 60
Setting detail: OUTPATIENT SURGERY
Discharge: HOME | End: 2020-09-10
Attending: SURGERY | Admitting: SURGERY
Payer: COMMERCIAL

## 2020-09-10 VITALS
DIASTOLIC BLOOD PRESSURE: 85 MMHG | OXYGEN SATURATION: 96 % | HEIGHT: 69 IN | HEART RATE: 93 BPM | RESPIRATION RATE: 17 BRPM | BODY MASS INDEX: 34.07 KG/M2 | TEMPERATURE: 97.5 F | WEIGHT: 230 LBS | SYSTOLIC BLOOD PRESSURE: 135 MMHG

## 2020-09-10 VITALS
OXYGEN SATURATION: 88 % | SYSTOLIC BLOOD PRESSURE: 141 MMHG | RESPIRATION RATE: 22 BRPM | DIASTOLIC BLOOD PRESSURE: 97 MMHG

## 2020-09-10 LAB
GLUCOSE BLD-MCNC: 143 MG/DL (ref 74–106)
PERFORMED ON: ABNORMAL

## 2020-09-10 PROCEDURE — 7100000011 HC PHASE II RECOVERY - ADDTL 15 MIN: Performed by: SURGERY

## 2020-09-10 PROCEDURE — 6360000002 HC RX W HCPCS: Performed by: NURSE ANESTHETIST, CERTIFIED REGISTERED

## 2020-09-10 PROCEDURE — 3609010400 HC COLONOSCOPY POLYPECTOMY HOT BIOPSY: Performed by: SURGERY

## 2020-09-10 PROCEDURE — 3700000000 HC ANESTHESIA ATTENDED CARE: Performed by: SURGERY

## 2020-09-10 PROCEDURE — 7100000010 HC PHASE II RECOVERY - FIRST 15 MIN: Performed by: SURGERY

## 2020-09-10 PROCEDURE — 2500000003 HC RX 250 WO HCPCS: Performed by: NURSE ANESTHETIST, CERTIFIED REGISTERED

## 2020-09-10 PROCEDURE — 2580000003 HC RX 258: Performed by: SURGERY

## 2020-09-10 PROCEDURE — 45384 COLONOSCOPY W/LESION REMOVAL: CPT | Performed by: SURGERY

## 2020-09-10 PROCEDURE — 2709999900 HC NON-CHARGEABLE SUPPLY: Performed by: SURGERY

## 2020-09-10 PROCEDURE — 3700000001 HC ADD 15 MINUTES (ANESTHESIA): Performed by: SURGERY

## 2020-09-10 RX ORDER — CYANOCOBALAMIN 1000 UG/ML
1000 INJECTION INTRAMUSCULAR; SUBCUTANEOUS
COMMUNITY

## 2020-09-10 RX ORDER — HYDROCODONE BITARTRATE AND ACETAMINOPHEN 7.5; 325 MG/1; MG/1
TABLET ORAL
COMMUNITY

## 2020-09-10 RX ORDER — POLYETHYLENE GLYCOL 3350 17 G/17G
POWDER, FOR SOLUTION ORAL
COMMUNITY
Start: 2020-08-12

## 2020-09-10 RX ORDER — EZETIMIBE 10 MG/1
10 TABLET ORAL DAILY
COMMUNITY

## 2020-09-10 RX ORDER — SODIUM CHLORIDE, SODIUM LACTATE, POTASSIUM CHLORIDE, CALCIUM CHLORIDE 600; 310; 30; 20 MG/100ML; MG/100ML; MG/100ML; MG/100ML
INJECTION, SOLUTION INTRAVENOUS CONTINUOUS
Status: DISCONTINUED | OUTPATIENT
Start: 2020-09-10 | End: 2020-09-10 | Stop reason: HOSPADM

## 2020-09-10 RX ORDER — PROPOFOL 10 MG/ML
INJECTION, EMULSION INTRAVENOUS PRN
Status: DISCONTINUED | OUTPATIENT
Start: 2020-09-10 | End: 2020-09-10 | Stop reason: SDUPTHER

## 2020-09-10 RX ORDER — ALBUTEROL SULFATE 90 UG/1
2 AEROSOL, METERED RESPIRATORY (INHALATION) EVERY 6 HOURS PRN
COMMUNITY

## 2020-09-10 RX ORDER — CLONIDINE HYDROCHLORIDE 0.1 MG/1
0.1 TABLET ORAL DAILY
COMMUNITY
Start: 2019-06-19

## 2020-09-10 RX ORDER — CLOPIDOGREL BISULFATE 75 MG/1
75 TABLET ORAL DAILY
COMMUNITY

## 2020-09-10 RX ORDER — DESLORATADINE 5 MG/1
5 TABLET ORAL DAILY
COMMUNITY

## 2020-09-10 RX ORDER — ROSUVASTATIN CALCIUM 10 MG/1
TABLET, COATED ORAL
COMMUNITY

## 2020-09-10 RX ORDER — QUETIAPINE FUMARATE 25 MG/1
TABLET, FILM COATED ORAL
COMMUNITY
Start: 2019-05-08

## 2020-09-10 RX ORDER — ARIPIPRAZOLE 10 MG/1
10 TABLET ORAL DAILY
COMMUNITY
Start: 2019-06-19

## 2020-09-10 RX ORDER — MONTELUKAST SODIUM 10 MG/1
TABLET ORAL
COMMUNITY

## 2020-09-10 RX ORDER — LISINOPRIL 10 MG/1
TABLET ORAL
COMMUNITY

## 2020-09-10 RX ORDER — OLOPATADINE HYDROCHLORIDE 2 MG/ML
SOLUTION/ DROPS OPHTHALMIC
COMMUNITY

## 2020-09-10 RX ADMIN — PROPOFOL 70 MG: 10 INJECTION, EMULSION INTRAVENOUS at 08:39

## 2020-09-10 RX ADMIN — PROPOFOL 30 MG: 10 INJECTION, EMULSION INTRAVENOUS at 08:51

## 2020-09-10 RX ADMIN — PROPOFOL 40 MG: 10 INJECTION, EMULSION INTRAVENOUS at 08:42

## 2020-09-10 RX ADMIN — PROPOFOL 30 MG: 10 INJECTION, EMULSION INTRAVENOUS at 08:47

## 2020-09-10 RX ADMIN — PROPOFOL 30 MG: 10 INJECTION, EMULSION INTRAVENOUS at 08:48

## 2020-09-10 RX ADMIN — PROPOFOL 30 MG: 10 INJECTION, EMULSION INTRAVENOUS at 08:45

## 2020-09-10 RX ADMIN — SODIUM CHLORIDE, POTASSIUM CHLORIDE, SODIUM LACTATE AND CALCIUM CHLORIDE: 600; 310; 30; 20 INJECTION, SOLUTION INTRAVENOUS at 08:09

## 2020-09-10 RX ADMIN — LIDOCAINE HYDROCHLORIDE 20 MG: 10 INJECTION, SOLUTION INFILTRATION; PERINEURAL at 08:39

## 2020-09-10 ASSESSMENT — PAIN SCALES - GENERAL: PAINLEVEL_OUTOF10: 0

## 2020-09-10 ASSESSMENT — PAIN - FUNCTIONAL ASSESSMENT: PAIN_FUNCTIONAL_ASSESSMENT: 0-10

## 2020-09-10 NOTE — ANESTHESIA POSTPROCEDURE EVALUATION
Department of Anesthesiology  Postprocedure Note    Patient: Tiff Pal  MRN: [id number]  YOB: 1960  Date of evaluation: 9/10/2020  Time:  10:14 AM     Procedure Summary     Date:  09/10/20 Room / Location:  [address] [place] [address] 01 / [address] [facility]    Anesthesia Start:  9240 Anesthesia Stop:  2424    Procedure:  COLONOSCOPY DIAGNOSTIC (N/A ) Diagnosis:  (Z12.11)    Surgeon:  Ran Tovar MD Responsible Provider:  VILMA Booth CRNA    Anesthesia Type:  MAC ASA Status:  3          Anesthesia Type: MAC    Vincent Phase I: Vincent Score: 10    Vincent Phase II: Vincent Score: 10    Last vitals: Reviewed and per EMR flowsheets.        Anesthesia Post Evaluation    Patient location during evaluation: bedside  Patient participation: complete - patient participated  Level of consciousness: awake and alert  Airway patency: patent  Nausea & Vomiting: no nausea and no vomiting  Complications: no  Cardiovascular status: blood pressure returned to baseline  Respiratory status: acceptable  Hydration status: stable

## 2020-09-10 NOTE — PROGRESS NOTES
Pt maxwell PO well without N/V.  IV DC'd with tip intact and pressure held. DC instructions with F/U given without questions. Condition stable. Belongings with pt. Pt ambulatory at d/c and accompanied to  in waiting area. Pt denies dizziness/ light headedness.

## 2020-09-10 NOTE — PROGRESS NOTES
Pt arrived ambulatory to endoscopy. , Dioni Forth to drive home. Consent verified without questions. LR at 80cc/hr per gravity. Prep completed and effective per pt. DC instructions completed with pt. No questions at this time. Denies need to void at this time. Will continue to monitor closely.

## 2020-09-10 NOTE — ANESTHESIA PRE PROCEDURE
Component Value Date    WBC 7.6 05/07/2014    RBC 4.74 05/07/2014    HGB 14.4 05/07/2014    HCT 42.7 05/07/2014    MCV 90.1 05/07/2014     05/07/2014       CMP:   Lab Results   Component Value Date    .0 05/07/2014    K 3.4 05/07/2014    .1 05/07/2014    CO2 27.0 05/07/2014    BUN 15.0 05/07/2014    CREATININE 1.1 05/07/2014    AGRATIO 2.0 05/07/2014    LABGLOM 55 05/07/2014    GLUCOSE 115.0 05/07/2014    PROT 6.80 05/07/2014    CALCIUM 9.5 05/07/2014    BILITOT 0.2 05/07/2014    ALKPHOS 101.0 05/07/2014    AST 27.0 05/07/2014    ALT 37.0 05/07/2014       POC Tests:   Recent Labs     09/10/20  0754   POCGLU 143*       Coags: No results found for: PROTIME, INR, APTT    HCG (If Applicable): No results found for: PREGTESTUR, PREGSERUM, HCG, HCGQUANT     ABGs: No results found for: PHART, PO2ART, FIW7QBQ, XVJ8PHS, BEART, Z6EIHSII     Type & Screen (If Applicable):  No results found for: LABABO, LABRH    Drug/Infectious Status (If Applicable):  No results found for: HIV, HEPCAB    COVID-19 Screening (If Applicable):   Lab Results   Component Value Date    COVID19 Not Detected 09/09/2020         Anesthesia Evaluation  Patient summary reviewed and Nursing notes reviewed  Airway: Mallampati: II        Dental:          Pulmonary:normal exam  breath sounds clear to auscultation  (+) asthma:                            Cardiovascular:  Exercise tolerance: good (>4 METS),   (+) hypertension:, hyperlipidemia        Rhythm: regular  Rate: normal                    Neuro/Psych:   (+) TIA,             GI/Hepatic/Renal:            ROS comment: BMI 33.   Endo/Other:    (+) DiabetesType II DM, poorly controlled, , .          Pt had PAT visit. Abdominal:           Vascular:   + PVD, aortic or cerebral, . ROS comment: Hx aortic arch aneurysm . Anesthesia Plan      MAC     ASA 3       Induction: intravenous. Anesthetic plan and risks discussed with patient.     Use of blood products discussed with patient whom.                    VILMA Negron - CRNA   9/10/2020

## 2020-09-10 NOTE — FLOWSHEET NOTE
Results for Eddie King (MRN [id number]) as of 9/10/2020 08:16   Ref.  Range 9/10/2020 07:54   POC Glucose Latest Ref Range: 74 - 106 mg/dl 143 (H)

## 2020-09-10 NOTE — OP NOTE
PATIENT:    Polina Dominguez    DATE OF SURGERY:  09/10/20    PHYSICIAN:    Page Urbano MD, FACS    REFERRING PHYSICIAN:  VILMA Narayan CNP      YOB: 1960    PREOPERATIVE DIAGNOSIS:   Family history of colon cancer    POSTOPERATIVE DIAGNOSIS: Sigmoid colon polyp      Estimated blood loss: None    PROCEDURE:  Colonoscopy with polypectomy via hot forcep    HISTORY:   The patient was sent to me as a consultation via VILMA Narayan CNP for evaluation and treatment of the above-mentioned symptomatology. The patient is here now today for elective colonoscopy. I did meet with the patient preoperatively who understands the full risks and benefits of the above-mentioned procedure. We will proceed with this today on an elective basis. ANESTHESIA:  The patient was monitored both preoperatively and postoperatively in the normal fashion from a cardiovascular standpoint. Oxygen was delivered at 2 liters per nasal cannula, and oxygen saturations were monitored during the procedure. The patient remained stable from a cardiovascular standpoint throughout the entire procedure. OPERATIVE PROCEDURE:  The patient was taken to the endoscopy unit and placed in the supine position and given anesthesia as mentioned above. The patient was then placed in the left lateral decubitus position and the scope was introduced into the patients anus and then into the rectum without difficulty. The scope was carefully advanced throughout the colon to the ileocecal valve. All mucosal surfaces were visualized. Upon careful withdrawal of the scope, there was noted to be a 5 mm polyp in the mid sigmoid colon, removed with hot forceps at the pathology. No other findings in the colon. .      A retroflexed view was obtained of the patients rectum and this showed no hemorrhoids. Digital rectal examination was performed and revealed good sphincter tone and no obvious masses.      The patient was stable

## 2020-09-10 NOTE — PROGRESS NOTES
Pt to room via stretcher, NAD, No C/O noted. Assessment  Completed. Pt drowsy but arouses. Pt on 3 L O2 NC. Abd soft/NT/ND; + flatus, +BS, Denies N/V/abdpain.

## 2020-09-30 ENCOUNTER — OFFICE VISIT (OUTPATIENT)
Dept: NEUROLOGY | Facility: CLINIC | Age: 60
End: 2020-09-30

## 2020-09-30 DIAGNOSIS — R26.89 POOR BALANCE: ICD-10-CM

## 2020-09-30 DIAGNOSIS — M54.2 NECK PAIN: Primary | ICD-10-CM

## 2020-09-30 PROCEDURE — 99441 PR PHYS/QHP TELEPHONE EVALUATION 5-10 MIN: CPT | Performed by: NURSE PRACTITIONER

## 2020-09-30 NOTE — PROGRESS NOTES
Subjective:     Patient ID: Katelyn Wilson is a 60 y.o. female.    CC:   Chief Complaint   Patient presents with   • Extremity Weakness   • Neck Pain       HPI:   History of Present Illness   Ms. Wilson is a 59-year-old patient of Dr. Andrade here for follow-up via telephone visit with consent.  I saw her last 6 weeks ago.     She has been seeing Dr. Andrade for several years for migraines, fibromyalgia,, neck pain and problems with balance.  When she was seen by Dr. Andrade April 2018. Dr. Andrade did attempt to get an MRI of the brain due to problems with balance as well as an MRI of the C-spine due to balance issues complains of neck pain and left arm radiculopathy.  Apparently MRIs were denied.  She had not done physical therapy.   At last visit she asked me to reorder MRI of the brain and C-spine, I did also put in an order for physical therapy.  She did not go through physical therapy, insurance again denied MRI of the C-spine as she had not completed physical therapy.  They did however get MRI of the brain and this was read as stable.  Also her last I did reorder physical therapy and she was to follow-up here after 6 weeks and we are going to reorder MRI of the C-spine.  Unfortunately she was diagnosed with COVID and was unable to complete physical therapy.  She continues to complain of problems with balance, weakness in her extremities and neck pain.  The following portions of the patient's history were reviewed and updated as appropriate: allergies, current medications, past family history, past medical history, past social history, past surgical history and problem list.    Past Medical History:   Diagnosis Date   • Ataxia    • Calculus of distal right ureter    • Cardiac murmur 6/26/2019   • Chest pain     [facility] 2003-near normal coronaries, Cardiolite 2007-Exercised for 7 minutes and 22 seconds-no evidence of ischemia, LVEF 84%, ER presentation on 3/17/16 Dr. 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    • Hyperlipidemia    • Hypertension    • Muscle weakness (generalized)    • Neck pain    • Obesity    • Osteoarthritis    • Stroke (CMS/HCC)     MRI of brain  revealing Microvascular changes and evidence of 3mm, left basal fanglia lacunar infarct with initiation of Plavix   • Syncope     neg Tilt Table 2011   • Tendonitis    • Ureteral stone        Past Surgical History:   Procedure Laterality Date   • APPENDECTOMY     • CHOLECYSTECTOMY     • HYSTERECTOMY     • ROTATOR CUFF REPAIR Left        Social History     Socioeconomic History   • Marital status:      Spouse name: Not on file   • Number of children: Not on file   • Years of education: Not on file   • Highest education level: Not on file   Tobacco Use   • Smoking status: Former Smoker     Types: Cigarettes     Quit date:      Years since quittin.   • Smokeless tobacco: Never Used   Substance and Sexual Activity   • Alcohol use: No   • Drug use: No   • Sexual activity: Defer       Family History   Problem Relation Age of Onset   • Colon cancer Mother    • Diabetes Mother    • Hypertension Mother    • Colon cancer Father    • Hypertension Father    • Prostate cancer Father    • Diabetes Maternal Grandmother    • Hypertension Maternal Grandmother    • Diabetes Maternal Grandfather    • Prostate cancer Maternal Grandfather    • Hypertension Paternal Grandmother    • Lung cancer Paternal Grandfather    • Prostate cancer Paternal Grandfather         Review of Systems   Constitutional: Positive for fatigue.   Respiratory: Negative.    Cardiovascular: Negative.    Musculoskeletal: Positive  for neck pain.   Neurological: Positive for weakness and headaches ( Headaches during COVID infection, improved). Negative for tremors, seizures, syncope, speech difficulty, light-headedness and numbness. Dizziness:  Mild dizziness since COVID infection, improving.        Objective:  There were no vitals taken for this visit.    Neurologic Exam     Mental Status   Exam limited as this is a telephone visit, patient alert and oriented, speech clear       Physical Exam    Assessment/Plan:       Katelyn was seen today for extremity weakness and neck pain.    Diagnoses and all orders for this visit:    Neck pain  -     MRI Cervical Spine Without Contrast    Poor balance  -     MRI Cervical Spine Without Contrast    She is starting physical therapy, she unfortunately had to delay start due to COVID infection.  We will reorder MRI of the cervical spine without contrast due to her balance issues, neck pain, rule out cord compression causing myelopathy  Return to clinic in 3 months, sooner if needed       Time of telephone visit 8 minutes  Reviewed medications, potential side effects and signs and symptoms to report. Discussed risk versus benefits of treatment plan with patient and/or family-including medications, labs and radiology that may be ordered. Addressed questions and concerns during visit. Patient and/or family verbalized understanding and agree with plan.        Marissa Nazario, APRN  9/30/2020

## 2020-10-14 ENCOUNTER — TELEPHONE (OUTPATIENT)
Dept: NEUROLOGY | Facility: CLINIC | Age: 60
End: 2020-10-14

## 2020-10-14 NOTE — TELEPHONE ENCOUNTER
----- Message from OTTO Perry sent at 9/30/2020 10:48 AM EDT -----  Please schedule patient 3-month follow-up

## 2020-10-19 ENCOUNTER — HOSPITAL ENCOUNTER (OUTPATIENT)
Dept: MRI IMAGING | Facility: HOSPITAL | Age: 60
Discharge: HOME | End: 2020-10-19
Admitting: NURSE PRACTITIONER

## 2020-10-19 PROCEDURE — 72141 MRI NECK SPINE W/O DYE: CPT

## 2020-10-26 ENCOUNTER — TELEPHONE (OUTPATIENT)
Dept: NEUROLOGY | Facility: CLINIC | Age: 60
End: 2020-10-26

## 2020-10-26 NOTE — TELEPHONE ENCOUNTER
----- Message from OTTO Perry sent at 10/23/2020 10:56 AM EDT -----  Please let patient know her MRI of the neck showed very mild progression or change from her 2016 scan.  There was some evidence of narrowing on the left side where the nerve exits.  Has she tried physical therapy yet?  I do suggest that she do a round of PT

## 2020-10-28 NOTE — TELEPHONE ENCOUNTER
Katelyn notified of results. She has tried PT in the past and felt like it made it worse. She is not interested in doing PT.

## 2020-12-14 ENCOUNTER — TELEPHONE (OUTPATIENT)
Dept: SURGERY | Facility: CLINIC | Age: 60
End: 2020-12-14

## 2020-12-14 NOTE — TELEPHONE ENCOUNTER
Patient had no showed for appointment with Dr Love 09/17/20 follow up colonoscopy .Patient stated she wasn't aware of appointment and that she was doing fine.

## 2021-01-12 ENCOUNTER — OFFICE VISIT (OUTPATIENT)
Dept: NEUROLOGY | Facility: CLINIC | Age: 61
End: 2021-01-12

## 2021-01-12 VITALS
WEIGHT: 231 LBS | DIASTOLIC BLOOD PRESSURE: 70 MMHG | HEART RATE: 87 BPM | BODY MASS INDEX: 34.21 KG/M2 | HEIGHT: 69 IN | SYSTOLIC BLOOD PRESSURE: 100 MMHG | TEMPERATURE: 96.2 F | OXYGEN SATURATION: 96 %

## 2021-01-12 DIAGNOSIS — M47.812 CERVICAL SPONDYLOSIS: ICD-10-CM

## 2021-01-12 DIAGNOSIS — R42 DIZZINESS: Primary | ICD-10-CM

## 2021-01-12 DIAGNOSIS — M54.2 NECK PAIN: ICD-10-CM

## 2021-01-12 DIAGNOSIS — R26.89 POOR BALANCE: ICD-10-CM

## 2021-01-12 PROCEDURE — 99213 OFFICE O/P EST LOW 20 MIN: CPT | Performed by: NURSE PRACTITIONER

## 2021-01-12 RX ORDER — BACLOFEN 10 MG/1
TABLET ORAL
COMMUNITY
Start: 2020-11-27 | End: 2021-03-15

## 2021-01-12 RX ORDER — ESOMEPRAZOLE MAGNESIUM 40 MG/1
40 CAPSULE, DELAYED RELEASE ORAL
COMMUNITY
Start: 2020-11-27

## 2021-02-24 ENCOUNTER — HOSPITAL ENCOUNTER (OUTPATIENT)
Dept: CARDIOLOGY | Facility: HOSPITAL | Age: 61
Discharge: HOME | End: 2021-02-24
Admitting: NURSE PRACTITIONER

## 2021-02-24 VITALS — WEIGHT: 231 LBS | BODY MASS INDEX: 34.21 KG/M2 | HEIGHT: 69 IN

## 2021-02-24 LAB
BH CV ECHO MEAS - BSA(HAYCOCK): 2.3 M^2
BH CV ECHO MEAS - BSA: 2.2 M^2
BH CV ECHO MEAS - BZI_BMI: 34.1 KILOGRAMS/M^2
BH CV ECHO MEAS - BZI_METRIC_HEIGHT: 175.3 CM
BH CV ECHO MEAS - BZI_METRIC_WEIGHT: 104.8 KG
BH CV XLRA MEAS LEFT CCA RATIO VEL: 80.5 CM/SEC
BH CV XLRA MEAS LEFT DIST CCA EDV: 29.8 CM/SEC
BH CV XLRA MEAS LEFT DIST CCA PSV: 80.5 CM/SEC
BH CV XLRA MEAS LEFT DIST ICA EDV: 56.6 CM/SEC
BH CV XLRA MEAS LEFT DIST ICA PSV: 125 CM/SEC
BH CV XLRA MEAS LEFT ICA RATIO VEL: 92.8 CM/SEC
BH CV XLRA MEAS LEFT ICA/CCA RATIO: 1.2
BH CV XLRA MEAS LEFT MID CCA EDV: 29.8 CM/SEC
BH CV XLRA MEAS LEFT MID CCA PSV: 83.3 CM/SEC
BH CV XLRA MEAS LEFT MID ICA EDV: 40.8 CM/SEC
BH CV XLRA MEAS LEFT MID ICA PSV: 92.8 CM/SEC
BH CV XLRA MEAS LEFT PROX CCA EDV: 25.9 CM/SEC
BH CV XLRA MEAS LEFT PROX CCA PSV: 86.6 CM/SEC
BH CV XLRA MEAS LEFT PROX ECA EDV: 23.6 CM/SEC
BH CV XLRA MEAS LEFT PROX ECA PSV: 80 CM/SEC
BH CV XLRA MEAS LEFT PROX ICA EDV: 29.5 CM/SEC
BH CV XLRA MEAS LEFT PROX ICA PSV: 81.3 CM/SEC
BH CV XLRA MEAS LEFT PROX SCLA PSV: 115 CM/SEC
BH CV XLRA MEAS LEFT VERTEBRAL A EDV: 24.8 CM/SEC
BH CV XLRA MEAS LEFT VERTEBRAL A PSV: 50.9 CM/SEC
BH CV XLRA MEAS RIGHT CCA RATIO VEL: 73.2 CM/SEC
BH CV XLRA MEAS RIGHT DIST CCA EDV: 27 CM/SEC
BH CV XLRA MEAS RIGHT DIST CCA PSV: 73.2 CM/SEC
BH CV XLRA MEAS RIGHT DIST ICA EDV: 39.3 CM/SEC
BH CV XLRA MEAS RIGHT DIST ICA PSV: 91.8 CM/SEC
BH CV XLRA MEAS RIGHT ICA RATIO VEL: 71.2 CM/SEC
BH CV XLRA MEAS RIGHT ICA/CCA RATIO: 0.97
BH CV XLRA MEAS RIGHT MID CCA EDV: 25.5 CM/SEC
BH CV XLRA MEAS RIGHT MID CCA PSV: 68.3 CM/SEC
BH CV XLRA MEAS RIGHT MID ICA EDV: 23.6 CM/SEC
BH CV XLRA MEAS RIGHT MID ICA PSV: 59.4 CM/SEC
BH CV XLRA MEAS RIGHT PROX CCA EDV: 18.9 CM/SEC
BH CV XLRA MEAS RIGHT PROX CCA PSV: 91.1 CM/SEC
BH CV XLRA MEAS RIGHT PROX ECA EDV: 20.1 CM/SEC
BH CV XLRA MEAS RIGHT PROX ECA PSV: 100 CM/SEC
BH CV XLRA MEAS RIGHT PROX ICA EDV: 18.2 CM/SEC
BH CV XLRA MEAS RIGHT PROX ICA PSV: 71.2 CM/SEC
BH CV XLRA MEAS RIGHT PROX SCLA PSV: 103 CM/SEC
BH CV XLRA MEAS RIGHT VERTEBRAL A EDV: 13.8 CM/SEC
BH CV XLRA MEAS RIGHT VERTEBRAL A PSV: 40.9 CM/SEC
LEFT ARM BP: NORMAL MMHG
RIGHT ARM BP: NORMAL MMHG

## 2021-02-24 PROCEDURE — 93880 EXTRACRANIAL BILAT STUDY: CPT | Performed by: INTERNAL MEDICINE

## 2021-02-24 PROCEDURE — 93880 EXTRACRANIAL BILAT STUDY: CPT

## 2021-02-26 ENCOUNTER — TELEPHONE (OUTPATIENT)
Dept: NEUROLOGY | Facility: CLINIC | Age: 61
End: 2021-02-26

## 2021-02-26 NOTE — PROGRESS NOTES
Please let patient know her carotid artery ultrasound showed no significant blockage, she had mild plaque bilaterally, be sure to maintain good control of her blood pressure and cholesterol  If lipids are being followed by primary care I will be happy to put in order

## 2021-03-02 NOTE — TELEPHONE ENCOUNTER
Katelyn notified of results. She has her cholesterol checked every 3 months by her PCP. It has been ok

## 2021-03-15 ENCOUNTER — OFFICE VISIT (OUTPATIENT)
Dept: NEUROSURGERY | Facility: CLINIC | Age: 61
End: 2021-03-15

## 2021-03-15 VITALS
BODY MASS INDEX: 34.51 KG/M2 | DIASTOLIC BLOOD PRESSURE: 70 MMHG | HEIGHT: 69 IN | WEIGHT: 233 LBS | TEMPERATURE: 97.3 F | SYSTOLIC BLOOD PRESSURE: 110 MMHG

## 2021-03-15 DIAGNOSIS — M54.2 NECK PAIN: Primary | ICD-10-CM

## 2021-03-15 PROCEDURE — 99204 OFFICE O/P NEW MOD 45 MIN: CPT | Performed by: NEUROLOGICAL SURGERY

## 2021-03-15 NOTE — PROGRESS NOTES
NAME: JHOANA OLSEN   DOS: 3/15/2021  : 1960  PCP: Dina Hsu APRN    Chief Complaint:    Chief Complaint   Patient presents with   • Back Pain     Low back pain   • Cervical pain     Extends to left shoulder.        History of Present Illness:  60 y.o. female   I saw a 60-year-old with a history of fibromyalgia she presents with a chronic history of pain in the neck and arm and has had 4 cervical MRIs previously and is here for evaluation dating from .  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    PMHX  Allergies:  Allergies   Allergen Reactions   • Atorvastatin    • Codeine Other (See Comments)     Tachycardia   • Diltiazem Other (See Comments)     Headache   • Estrogens    • Oxycodone    • Propoxyphene    • Diovan [Valsartan] Rash     Medications    Current Outpatient Medications:   •  albuterol (PROAIR HFA) 108 (90 BASE) MCG/ACT inhaler, ProAir  (90 Base) MCG/ACT Inhalation Aerosol Solution; Patient Sig: ProAir  (90 Base) MCG/ACT Inhalation Aerosol Solution ; 8; 0; ; Active, Disp: , Rfl:   •  ARIPiprazole (ABILIFY) 10 MG tablet, Take 10 mg by mouth Daily., Disp: , Rfl: 2  •  CloNIDine (CATAPRES) 0.1 MG tablet, Take 0.1 mg by mouth Daily., Disp: , Rfl: 2  •  clopidogrel (PLAVIX) 75 MG tablet, Take 75 mg by mouth Daily., Disp: , Rfl:   •  cyanocobalamin 1000 MCG/ML injection, Every 30 (Thirty) Days., Disp: , Rfl:   •  Dapagliflozin Propanediol (FARXIGA) 10 MG tablet, Take 10 mg by mouth Daily., Disp: , Rfl:   •  desloratadine (CLARINEX) 5 MG tablet, Take 5 mg by mouth Daily., Disp: , Rfl:   •  DULoxetine (CYMBALTA) 30 MG capsule, Take 30 mg by mouth Daily., Disp: , Rfl: 1  •  DULoxetine (CYMBALTA) 60 MG capsule,  Take 60 mg by mouth Daily., Disp: , Rfl:   •  esomeprazole (nexIUM) 40 MG capsule, , Disp: , Rfl:   •  ezetimibe (ZETIA) 10 MG tablet, Take 10 mg by mouth Daily., Disp: , Rfl:   •  gabapentin (NEURONTIN) 600 MG tablet, Take 600 mg by mouth 3 (Three) Times a Day. 2 tabs three times a day, Disp: , Rfl:   •  HYDROcodone-acetaminophen (NORCO)  MG per tablet, Take 1 tablet by mouth As Needed., Disp: , Rfl: 0  •  lisinopril (PRINIVIL,ZESTRIL) 10 MG tablet, Take 10 mg by mouth Daily., Disp: , Rfl:   •  metFORMIN (GLUCOPHAGE) 1000 MG tablet, Take 1,000 mg by mouth 2 (Two) Times a Day With Meals., Disp: , Rfl:   •  metoprolol tartrate (LOPRESSOR) 25 MG tablet, Take 25 mg by mouth daily. 1/2 tablet daily, Disp: , Rfl:   •  montelukast (SINGULAIR) 10 MG tablet, Take 10 mg by mouth Every Night., Disp: , Rfl:   •  nitroglycerin (NITROSTAT) 0.4 MG SL tablet, Place 0.4 mg under the tongue As Needed for Chest Pain. Take no more than 3 doses in 15 minutes. , Disp: , Rfl:   •  olopatadine (PATADAY) 0.2 % solution ophthalmic solution, 1 drop Daily., Disp: , Rfl:   •  QUEtiapine (SEROquel) 25 MG tablet, Take 25 mg by mouth Every Night., Disp: , Rfl: 2  •  rosuvastatin (CRESTOR) 10 MG tablet, Take 10 mg by mouth Daily., Disp: , Rfl:   •  SITagliptin (JANUVIA) 100 MG tablet, Take 100 mg by mouth Daily., Disp: , Rfl:   •  vitamin D (ERGOCALCIFEROL) 1.25 MG (55182 UT) capsule capsule, Take 50,000 Units by mouth 1 (One) Time Per Week., Disp: , Rfl:   Past Medical History:  Past Medical History:   Diagnosis Date   • Asthma    • Ataxia    • Bronchitis    • Calculus of distal right ureter    • Cardiac murmur 6/26/2019   • Chest pain     C feb 2003-near normal coronaries, Cardiolite 2007-Exercised for 7 minutes and 22 seconds-no evidence of ischemia, LVEF 84%, ER presentation on 3/17/16 Dr. 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    • Hyperlipidemia    • Hypertension    • Low back pain    • Muscle weakness (generalized)    • Neck pain    • Obesity    • Osteoarthritis    • Osteoporosis    • Stroke (CMS/HCC)     MRI of brain 2001 revealing Microvascular changes and evidence of 3mm, left basal fanglia lacunar infarct with initiation of Plavix   • Syncope     neg Tilt Table 2011   • Tendonitis    • Ureteral stone      Past Surgical History:  Past Surgical History:   Procedure Laterality Date   • APPENDECTOMY     • CHOLECYSTECTOMY     • HYSTERECTOMY     • ROTATOR CUFF REPAIR Left      Social Hx:  Social History     Tobacco Use   • Smoking status: Former Smoker     Types: Cigarettes     Quit date:      Years since quittin.   • Smokeless tobacco: Never Used   Substance Use Topics   • Alcohol use: No   • Drug use: No     Family Hx:  Family History   Problem Relation Age of Onset   • Colon cancer Mother    • Diabetes Mother    • Hypertension Mother    • Cancer Mother    • Heart disease Mother    • Colon cancer Father    • Hypertension Father    • Prostate cancer Father    • Arthritis Father    • Cancer Father    • Heart disease Father    • Diabetes Maternal Grandmother    • Hypertension Maternal Grandmother    • Diabetes Maternal Grandfather    • Prostate cancer Maternal Grandfather    • Hypertension Paternal Grandmother    • Lung cancer Paternal Grandfather    • Prostate cancer Paternal Grandfather    • Bleeding Disorder Brother      Review of Systems:        Review of Systems   Constitutional: Positive for fatigue. Negative for activity change, appetite change, chills, diaphoresis, fever and unexpected weight change.   HENT: Positive for nosebleeds and sinus  pressure. Negative for congestion, dental problem, drooling, ear discharge, ear pain, facial swelling, hearing loss, mouth sores, postnasal drip, rhinorrhea, sinus pain, sneezing, sore throat, tinnitus, trouble swallowing and voice change.    Eyes: Negative for photophobia, pain, discharge, redness, itching and visual disturbance.   Respiratory: Negative for apnea, cough, choking, chest tightness, shortness of breath, wheezing and stridor.    Cardiovascular: Negative for chest pain, palpitations and leg swelling.   Gastrointestinal: Negative for abdominal distention, abdominal pain, anal bleeding, blood in stool, constipation, diarrhea, nausea, rectal pain and vomiting.   Endocrine: Negative for cold intolerance, heat intolerance, polydipsia, polyphagia and polyuria.   Genitourinary: Negative for decreased urine volume, difficulty urinating, dyspareunia, dysuria, enuresis, flank pain, frequency, genital sores, hematuria, menstrual problem, pelvic pain, urgency, vaginal bleeding, vaginal discharge and vaginal pain.   Musculoskeletal: Positive for back pain, neck pain and neck stiffness. Negative for arthralgias, gait problem, joint swelling and myalgias.   Skin: Negative for color change, pallor, rash and wound.   Allergic/Immunologic: Negative for environmental allergies, food allergies and immunocompromised state.   Neurological: Positive for dizziness and headaches. Negative for tremors, seizures, syncope, facial asymmetry, speech difficulty, weakness, light-headedness and numbness.   Hematological: Negative for adenopathy. Does not bruise/bleed easily.   Psychiatric/Behavioral: Positive for sleep disturbance. Negative for agitation, behavioral problems, confusion, decreased concentration, dysphoric mood, hallucinations, self-injury and suicidal ideas. The patient is nervous/anxious. The patient is not hyperactive.       I have reviewed this note template and all pertinent parts of the review of systems social,  family history, surgical history and medication list      Physical Examination:  Vitals:    03/15/21 1219   BP: 110/70   Temp: 97.3 °F (36.3 °C)      General Appearance:   Well developed, well nourished, well groomed, alert, and cooperative.  Neurological examination:  Neurologic Exam  Vital signs were reviewed and documented in the chart  Patient appeared in good neurologic function with normal comprehension fluent speech  Mood and affect are normal  Sense of smell deferred  Cranial nerves grossly intact  Muscle bulk and tone normal  5 out of 5 strength no motor drift  Gait normal intact  Negative Romberg  No clonus long tract signs or myelopathy  Arthritis bilateral shoulders present  Reflexes symmetric  No edema noted and extremities skin appears normal    Straight leg raise sign absent  No signs of intrinsic hip dysfunction  Cervical spine is without any lesions or abnormality  Arms are warm and well perfused        Review of Imaging/DATA:  I reviewed her MRI of the cervical spine and compared to her previous studies she does have multilevel degenerative disc disease she has bilateral C5-6 and C6-7 disease-I reviewed these personally and interpreted them and shared them with her  Diagnoses/Plan:    Ms. Wilson is a 60 y.o. female   This is a 6-year-old that has a history of chronic issues of pain with multiple MRIs that I reviewed she does have some progression of her C3-4-5 6 and 6 7 disease with intraforaminal components she may have some C5-6 left paraspinal involvement but right now the symptoms are somewhat concordant but not flagrant for cervical radiculopathy I recommended physical therapy physical conditioning and time.  We could consider cervical myelogram EMG nerve conduction study follow-up if her symptoms persisted but I warned that successful surgical treatment of this may not reliably relieve her neck pain    She can to think about it give us a call I explained the signs and symptoms to look for to  necessitate a referral back

## 2021-04-01 NOTE — PROGRESS NOTES
"Chief Complaint: \"Pain in my neck and in my left arm.\"        History of Present Illness:   Patient: Ms. Katelyn Wilson, 60 y.o. female   Referring Physician: Dr. Curtis Toledo  Reason for Referral: Consultation for chronic intractable neck pain.   Pain History:  Patient reports a several year history of progressive neck pain, which began without incident.  Apparently, symptoms started about 10 years ago.  She underwent her first cervical MRI in 2013.  Patient has a history of fibromyalgia. She reports a history of chronic neck pain.  MRI of the cervical spine from October 2020 revealed diffuse spondylosis.  Degenerative disc disease with some left lower protrusion particularly at C5-C6.  Patient has failed to obtain pain relief with conservative measures including oral analgesics and physical therapy.  She has been on a combination of opioids and gabapentin prescribed by her primary care physician for the past several years.  Katelyn Wilson underwent neurosurgical consultation with Dr. Curtis Toledo on 03/15/2021, and was found not to be a surgical candidate at that time.  Dr. Toledo discussed the possibility of cervical myelogram and electrodiagnostic studies if she continues to struggle with pain.  Pain has progressed in intensity over the past months.   Pain Description: Constant pain with intermittent exacerbation, described as aching, burning and dull sensation.   Radiation of Pain: The neck pain radiates into the left shoulder and left arm. Neck pain >>> left shoulder pain  Pain intensity today: 4/10  Average pain intensity last week: 5/10  Pain intensity ranges from: 3/10 to 6/10  Aggravating factors: Pain increases with extension, rotation of the cervical spine. \"My neck pops and cracks\"   Alleviating factors: Pain decreases with lying down   Associated Symptoms:   Patient reports intermittent numbness and weakness in the left upper extremity.   Patient denies any new bladder or bowel " problems.   Patient denies difficulties with her balance or recent falls.   Patient reports bilateral cervicogenic and occipital headaches several times per week lasting several hours.    Review of previous therapies and additional medical records:  Katelyn Wilson has already failed the following measures, including:   Conservative Measures: Oral analgesics, opioids, topical analgesics, ice, heat, massage, physical therapy   Interventional Measures: Had injections with Dr Armijo more than 4 years ago with relief  Surgical Measures: No history of previous cervical spine or shoulder surgery   Katelyn Wilson underwent neurosurgical consultation with Dr. Curtis Toledo on 03/15/2021, and was found not to be a surgical candidate.  Katelyn Wilson presents with significant comorbidities including hyperlipidemia, hypertension, history of TIA 2011, diabetes mellitus, gout, nephrolithiasis, obesity, migraines, dizziness, fibromyalgia, on Plavix  In terms of current analgesics, Katelyn Wilson takes: gabapentin, Norco, duloxetine.  Patient also takes Abilify, clonidine, quetiapine  I have reviewed [facility] Report #[id number] (gabapentin, Norco by Hawk Crowe) consistent with medication reconciliation.  SOAPP: Not completed by the patient    Global Pain Scale 04-06  2021          Pain 11          Feelings 6          Clinical outcomes 10          Activities 8          GPS Total: 35            Review of Diagnostic Studies:    MRI of the cervical spine without contrast 10/19/2020.  I have reviewed the images and the radiology report.  Vertebral body heights and alignment are maintained.  The cervical cord and signal are normal.  Multilevel cervical spondylosis.  Axial imaging:  C2-C3: Bilateral facet arthropathy.  No significant canal or foraminal stenosis  C3-C4: Disc osteophyte complex, bilateral facet arthropathy.  Mild bilateral foraminal stenosis  C4-C5: Disc osteophyte complex, bilateral facet  arthropathy.  Mild bilateral foraminal stenosis  C5-C6: Disc osteophyte complex, bilateral facet arthropathy.  Mild canal stenosis and mild bilateral foraminal stenosis  C6-C7: Disc osteophyte complex slightly eccentric to the left with associated spinal canal and moderate left foraminal stenosis  MRI of the brain without contrast 9/12/2019:  I have reviewed the images and the radiology report.  Essentially unremarkable      Review of Systems   HENT: Positive for drooling and sinus pressure.    Eyes: Positive for itching.   Respiratory: Positive for apnea.    Musculoskeletal: Positive for arthralgias, back pain and neck pain.   Neurological: Positive for dizziness.   Psychiatric/Behavioral: The patient is nervous/anxious (depression).    All other systems reviewed and are negative.        Patient Active Problem List   Diagnosis   • Chest pain   • Hyperlipidemia LDL goal <100   • Syncope   • Obesity   • Stroke (CMS/HCC)   • Neck pain   • Bilateral carpal tunnel syndrome   • Chronic sinusitis   • Nephrolithiasis   • Cervical spinal stenosis   • Calculus of distal right ureter   • Aortic ectasia, thoracic (CMS/HCC)   • Cervical spondylosis without myelopathy   • Migraine without aura and without status migrainosus, not intractable   • Poor balance   • Dizziness   • Cardiac murmur   • Essential hypertension   • DDD (degenerative disc disease), cervical   • Cervical spondylosis with radiculopathy       Past Medical History:   Diagnosis Date   • Arthritis    • Asthma    • Ataxia    • Bladder infection    • Bronchitis    • Calculus of distal right ureter    • Cardiac murmur 6/26/2019   • Chest pain     [facility] feb 2003-near normal coronaries, Cardiolite 2007-Exercised for 7 minutes and 22 seconds-no evidence of ischemia, LVEF 84%, ER presentation on 3/17/16 Dr. 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    • Hyperlipidemia    • Hypertension    • Low back pain    • Muscle weakness (generalized)    • Neck pain    • Obesity    • Osteoarthritis    • Osteoporosis    • Stroke (CMS/HCC)     MRI of brain  revealing Microvascular changes and evidence of 3mm, left basal fanglia lacunar infarct with initiation of Plavix   • Syncope     neg Tilt Table 2011   • Tendonitis    • Ureteral stone          Past Surgical History:   Procedure Laterality Date   • APPENDECTOMY     • CHOLECYSTECTOMY     • HYSTERECTOMY     • ROTATOR CUFF REPAIR Left          Family History   Problem Relation Age of Onset   • Colon cancer Mother    • Diabetes Mother    • Hypertension Mother    • Cancer Mother    • Heart disease Mother    • Colon cancer Father    • Hypertension Father    • Prostate cancer Father    • Arthritis Father    • Cancer Father    • Heart disease Father    • Diabetes Maternal Grandmother    • Hypertension Maternal Grandmother    • Diabetes Maternal Grandfather    • Prostate cancer Maternal Grandfather    • Hypertension Paternal Grandmother    • Lung cancer Paternal Grandfather    • Prostate cancer Paternal Grandfather    • Bleeding Disorder Brother          Social History     Socioeconomic History   • Marital status:      Spouse name: Not on file   • Number of children: Not on file   • Years of education: Not on file   • Highest education level: Not on file   Tobacco Use   • Smoking status: Former Smoker     Types: Cigarettes     Quit date:      Years since quittin.2   • Smokeless tobacco: Never Used   Substance and Sexual Activity   • Alcohol use: No   • Drug use: No   • Sexual activity: Defer           Current Outpatient Medications:   •  albuterol (PROAIR HFA)  108 (90 BASE) MCG/ACT inhaler, ProAir  (90 Base) MCG/ACT Inhalation Aerosol Solution; Patient Sig: ProAir  (90 Base) MCG/ACT Inhalation Aerosol Solution ; 8; 0; 20-Feb-2014; Active, Disp: , Rfl:   •  ARIPiprazole (ABILIFY) 10 MG tablet, Take 10 mg by mouth Daily., Disp: , Rfl: 2  •  CloNIDine (CATAPRES) 0.1 MG tablet, Take 0.1 mg by mouth Daily., Disp: , Rfl: 2  •  clopidogrel (PLAVIX) 75 MG tablet, Take 75 mg by mouth Daily., Disp: , Rfl:   •  cyanocobalamin 1000 MCG/ML injection, Every 30 (Thirty) Days., Disp: , Rfl:   •  Dapagliflozin Propanediol (FARXIGA) 10 MG tablet, Take 10 mg by mouth Daily., Disp: , Rfl:   •  desloratadine (CLARINEX) 5 MG tablet, Take 5 mg by mouth Daily., Disp: , Rfl:   •  DULoxetine (CYMBALTA) 30 MG capsule, Take 30 mg by mouth Daily., Disp: , Rfl: 1  •  DULoxetine (CYMBALTA) 60 MG capsule, Take 60 mg by mouth Daily., Disp: , Rfl:   •  esomeprazole (nexIUM) 40 MG capsule, , Disp: , Rfl:   •  ezetimibe (ZETIA) 10 MG tablet, Take 10 mg by mouth Daily., Disp: , Rfl:   •  gabapentin (NEURONTIN) 600 MG tablet, Take 600 mg by mouth 3 (Three) Times a Day. 2 tabs three times a day, Disp: , Rfl:   •  HYDROcodone-acetaminophen (NORCO)  MG per tablet, Take 1 tablet by mouth As Needed., Disp: , Rfl: 0  •  lisinopril (PRINIVIL,ZESTRIL) 10 MG tablet, Take 10 mg by mouth Daily., Disp: , Rfl:   •  metFORMIN (GLUCOPHAGE) 1000 MG tablet, Take 1,000 mg by mouth 2 (Two) Times a Day With Meals., Disp: , Rfl:   •  metoprolol tartrate (LOPRESSOR) 25 MG tablet, Take 25 mg by mouth daily. 1/2 tablet daily, Disp: , Rfl:   •  montelukast (SINGULAIR) 10 MG tablet, Take 10 mg by mouth Every Night., Disp: , Rfl:   •  olopatadine (PATADAY) 0.2 % solution ophthalmic solution, 1 drop Daily., Disp: , Rfl:   •  QUEtiapine (SEROquel) 25 MG tablet, Take 25 mg by mouth Every Night., Disp: , Rfl: 2  •  rosuvastatin (CRESTOR) 10 MG tablet, Take 10 mg by mouth Daily., Disp: , Rfl:   •  SITagliptin (JANUVIA)  "100 MG tablet, Take 100 mg by mouth Daily., Disp: , Rfl:   •  vitamin D (ERGOCALCIFEROL) 1.25 MG (95979 UT) capsule capsule, Take 50,000 Units by mouth 1 (One) Time Per Week., Disp: , Rfl:   •  nitroglycerin (NITROSTAT) 0.4 MG SL tablet, Place 0.4 mg under the tongue As Needed for Chest Pain. Take no more than 3 doses in 15 minutes. , Disp: , Rfl:       Allergies   Allergen Reactions   • Atorvastatin Myalgia     Causes muscle problems   • Estrogens Other (See Comments)     Elevates triglycerides   • Propoxyphene    • Diovan [Valsartan] Rash         /80 (BP Location: Right arm)   Pulse 72   Temp 97.5 °F (36.4 °C)   Resp 15   Ht 175.3 cm (69.02\")   Wt 106 kg (233 lb 3.2 oz)   SpO2 92%   BMI 34.42 kg/m²       Physical Exam:  Constitutional: Patient appears well-developed, well-nourished, well-hydrated  HEENT: Head: Normocephalic and atraumatic  Eyes: Conjunctivae and lids are normal  Pupils: Equal, round, reactive to light  Neck: Trachea normal. Neck supple. No JVD present.   Lymphatic: No cervical adenopathy  Pulmonary: No increased work of breathing or signs of respiratory distress. Auscultation of lungs: Clear to auscultation.   Cardiovascular: Normal rate and rhythm, normal S1 and S2, no murmurs.   Musculoskeletal   Gait and station: Gait evaluation demonstrated a normal gait. Able to walk on heels, toes, tandem walking   Cervical spine: Passive and active range of motion are limited secondary to pain. Extension, flexion, lateral flexion, rotation of the cervical spine increased and reproduced pain. Cervical facet joint loading maneuvers are positive.  Muscles: Presence of active trigger points levator scapulae   Shoulders: The range of motion of the glenohumeral joints is full and without pain. Rotator cuff strength is 5/5.   Neurological:   Patient is alert and oriented to person, place, and time.   Speech: Normal.   Cortical function: Normal mental status.   Cranial nerves 2-12: intact.   Reflex " Scores:  Right brachioradialis: 2+  Left brachioradialis: 2+  Right biceps: 2+  Left biceps: 2+  Right triceps: 2+  Left triceps: 2+  Right patellar: 2+  Left patellar: 2+  Right Achilles: 1+  Left Achilles: 1+  Motor strength: 5/5  Motor Tone: Normal .   Involuntary movements: None.   Superficial/Primitive Reflexes: Primitive reflexes were absent.   Right Buchanan: Absent  Left Buchanan: Absent  Right ankle clonus: Absent  Left ankle clonus: Absent   Babinsky: Absent  Spurling sign: Positive. Neck tornado test: Positive. Lhermitte sign: Negative. Long tract signs: Negative.   Sensory exam: Intact to light touch, intact pain and temperature sensation, intact vibration sensation and normal proprioception.   Coordination: Normal finger to nose and heel to shin. Normal balance and negative Romberg's sign   Skin and subcutaneous tissue: Skin is warm and intact. No rash noted. No cyanosis.   Psychiatric: Judgment and insight: Normal. Orientation to person, place and time: Normal. Recent and remote memory: Intact. Mood and affect: Normal.     ASSESSMENT:   1. Cervical spinal stenosis    2. DDD (degenerative disc disease), cervical    3. Cervical spondylosis without myelopathy    4. Class 1 obesity due to excess calories without serious comorbidity in adult, unspecified BMI    5. Migraine without aura and without status migrainosus, not intractable        PLAN/MEDICAL DECISION MAKING: Patient reports a several year history of progressive neck pain, which began without incident.  Apparently, symptoms started about 10 years ago.  She underwent her first cervical MRI in 2013. Patient also has a history of fibromyalgia. Patient has failed to obtain pain relief with conservative measures including oral analgesics and physical therapy.  She has been on a combination of opioids and gabapentin prescribed by her primary care physician for the past several years.  MRI of the cervical spine from October 2020 revealed diffuse  spondylosis. Degenerative disc disease with disc protrusion particularly at C5-C6. Katelyn Wilson underwent neurosurgical consultation with Dr. Curtis Toledo on 03/15/2021, and was found not to be a surgical candidate at that time.  Dr. Toledo discussed the possibility of cervical myelogram and electrodiagnostic studies if she continues to struggle with pain.  Pain has progressed in intensity over the past months. Patient has failed to obtain pain relief with conservative measures, as referenced above. I have reviewed all available patient's medical records as well as previous therapies as referenced above. I had a lengthy conversation with Ms. Katelyn Wilson regarding her chronic pain condition and potential therapeutic options including risks, benefits, alternative therapies, to name a few. Therefore, I have proposed the following plan:  1. Diagnostic studies: EMG/NCV and cervical myelogram CT postmyelogram if patient continues to struggle with pain  2. Pharmacological measures: Reviewed and discussed; Patient takes gabapentin, Norco, duloxetine.  Patient also takes Abilify, clonidine, quetiapine.   3. Interventional pain management measures: Patient will need to stop clopidogrel (Plavix) at least 7 days between last dose and procedure (Dr. Ponce) to proceed with cervical epidural steroid injection at C6-C7 by left paramedian interlaminar approach. We may repeat epidural depending on patient's outcome or consider diagnostic and therapeutic left C5-C6 and left C6-7 transforaminal epidural steroid injections. Patient will follow-up with Dr. Toledo thereafter for possible electrodiagnostic studies and cervical myelogram discussed surgical options..  4. Long-term rehabilitation efforts:  A. The patient does not have a history of falls. I did complete a risk assessment for falls  B. Patient will start a comprehensive physical therapy program for upper body strengthening/posture correction, gait  and balance training, neurodynamics, ultrasound, ASTYM, myofascial release, cupping and dry needling   C. Start an exercise program such as yoga and water therapy  D. Contrast therapy: Apply ice-packs for 15-20 minutes, followed by heating pads for 15-20 minutes to affected area   5. The patient has been instructed to contact my office with any questions or difficulties. The patient understands the plan and agrees to proceed accordingly.        Patient Care Team:  Hawk Crowe NP as PCP - General (Family Medicine)  Marissa Nazario APRN as Referring Physician (Neurology)     No orders of the defined types were placed in this encounter.        Future Appointments   Date Time Provider Department Center   4/8/2021  3:30 PM Lucrecia Ponce MD [facility] None   7/12/2021 11:00 AM Marissa Nazario APRN [facility]         Morgan Cardenas MD     EMR Dragon/Transcription disclaimer:  Much of this encounter note is an electronic transcription of spoken language to printed text. Electronic transcription of spoken language may permit erroneous, or at times, nonsensical words or phrases to be inadvertently transcribed. Although I have reviewed the note for such errors, some may still exist.

## 2021-04-03 PROBLEM — M50.30 DDD (DEGENERATIVE DISC DISEASE), CERVICAL: Status: ACTIVE | Noted: 2021-04-03

## 2021-04-06 ENCOUNTER — OFFICE VISIT (OUTPATIENT)
Dept: PAIN MEDICINE | Facility: CLINIC | Age: 61
End: 2021-04-06

## 2021-04-06 VITALS
DIASTOLIC BLOOD PRESSURE: 80 MMHG | SYSTOLIC BLOOD PRESSURE: 124 MMHG | RESPIRATION RATE: 15 BRPM | HEIGHT: 69 IN | TEMPERATURE: 97.5 F | BODY MASS INDEX: 34.54 KG/M2 | HEART RATE: 72 BPM | OXYGEN SATURATION: 92 % | WEIGHT: 233.2 LBS

## 2021-04-06 DIAGNOSIS — M48.02 CERVICAL SPINAL STENOSIS: Primary | ICD-10-CM

## 2021-04-06 DIAGNOSIS — M50.30 DDD (DEGENERATIVE DISC DISEASE), CERVICAL: ICD-10-CM

## 2021-04-06 DIAGNOSIS — G43.009 MIGRAINE WITHOUT AURA AND WITHOUT STATUS MIGRAINOSUS, NOT INTRACTABLE: ICD-10-CM

## 2021-04-06 DIAGNOSIS — M47.812 CERVICAL SPONDYLOSIS WITHOUT MYELOPATHY: ICD-10-CM

## 2021-04-06 DIAGNOSIS — E66.09 CLASS 1 OBESITY DUE TO EXCESS CALORIES WITHOUT SERIOUS COMORBIDITY IN ADULT, UNSPECIFIED BMI: ICD-10-CM

## 2021-04-06 DIAGNOSIS — M48.02 CERVICAL SPINAL STENOSIS: ICD-10-CM

## 2021-04-06 PROBLEM — M47.22 CERVICAL SPONDYLOSIS WITH RADICULOPATHY: Status: ACTIVE | Noted: 2021-04-06

## 2021-04-06 PROCEDURE — 99204 OFFICE O/P NEW MOD 45 MIN: CPT | Performed by: ANESTHESIOLOGY

## 2021-04-13 ENCOUNTER — TELEPHONE (OUTPATIENT)
Dept: PAIN MEDICINE | Facility: CLINIC | Age: 61
End: 2021-04-13

## 2021-04-13 NOTE — TELEPHONE ENCOUNTER
LVM for patient to stop Plavix on the 19th for the procedure scheduled for the 26th.   Will attempt to call patient again later.

## 2021-04-14 ENCOUNTER — TELEPHONE (OUTPATIENT)
Dept: CARDIOLOGY | Facility: CLINIC | Age: 61
End: 2021-04-14

## 2021-04-14 NOTE — TELEPHONE ENCOUNTER
----- Message from Lucrecia Ponce MD sent at 4/12/2021  3:28 PM EDT -----  She may stop plavix for the procedure  ----- Message -----  From: Dorene Elizondo MA  Sent: 4/6/2021   1:48 PM EDT  To: Lucrecia Ponce MD

## 2021-04-16 ENCOUNTER — TRANSCRIBE ORDERS (OUTPATIENT)
Dept: ADMINISTRATIVE | Facility: HOSPITAL | Age: 61
End: 2021-04-16

## 2021-04-16 DIAGNOSIS — Z01.818 PRE-OPERATIVE CLEARANCE: Primary | ICD-10-CM

## 2021-04-23 ENCOUNTER — LAB (OUTPATIENT)
Dept: LAB | Facility: HOSPITAL | Age: 61
End: 2021-04-23

## 2021-04-23 DIAGNOSIS — Z01.818 PRE-OPERATIVE CLEARANCE: ICD-10-CM

## 2021-04-23 PROCEDURE — C9803 HOPD COVID-19 SPEC COLLECT: HCPCS

## 2021-04-23 PROCEDURE — U0004 COV-19 TEST NON-CDC HGH THRU: HCPCS

## 2021-04-24 LAB — SARS-COV-2 RNA NOSE QL NAA+PROBE: NOT DETECTED

## 2021-04-26 ENCOUNTER — OUTSIDE FACILITY SERVICE (OUTPATIENT)
Dept: PAIN MEDICINE | Facility: CLINIC | Age: 61
End: 2021-04-26

## 2021-04-26 PROCEDURE — 99152 MOD SED SAME PHYS/QHP 5/>YRS: CPT | Performed by: ANESTHESIOLOGY

## 2021-04-26 PROCEDURE — 62321 NJX INTERLAMINAR CRV/THRC: CPT | Performed by: ANESTHESIOLOGY

## 2021-04-27 ENCOUNTER — TELEPHONE (OUTPATIENT)
Dept: PAIN MEDICINE | Facility: CLINIC | Age: 61
End: 2021-04-27

## 2021-07-12 ENCOUNTER — OFFICE VISIT (OUTPATIENT)
Dept: PAIN MEDICINE | Facility: CLINIC | Age: 61
End: 2021-07-12

## 2021-07-12 VITALS
HEART RATE: 90 BPM | TEMPERATURE: 96.4 F | BODY MASS INDEX: 35.4 KG/M2 | WEIGHT: 239 LBS | SYSTOLIC BLOOD PRESSURE: 130 MMHG | DIASTOLIC BLOOD PRESSURE: 82 MMHG | OXYGEN SATURATION: 97 % | HEIGHT: 69 IN

## 2021-07-12 DIAGNOSIS — M47.812 CERVICAL SPONDYLOSIS WITHOUT MYELOPATHY: ICD-10-CM

## 2021-07-12 DIAGNOSIS — E66.09 CLASS 1 OBESITY DUE TO EXCESS CALORIES WITHOUT SERIOUS COMORBIDITY IN ADULT, UNSPECIFIED BMI: ICD-10-CM

## 2021-07-12 DIAGNOSIS — M50.30 DDD (DEGENERATIVE DISC DISEASE), CERVICAL: ICD-10-CM

## 2021-07-12 DIAGNOSIS — M48.02 CERVICAL SPINAL STENOSIS: ICD-10-CM

## 2021-07-12 DIAGNOSIS — M48.02 CERVICAL SPINAL STENOSIS: Primary | ICD-10-CM

## 2021-07-12 DIAGNOSIS — G43.009 MIGRAINE WITHOUT AURA AND WITHOUT STATUS MIGRAINOSUS, NOT INTRACTABLE: ICD-10-CM

## 2021-07-12 PROCEDURE — 99213 OFFICE O/P EST LOW 20 MIN: CPT | Performed by: NURSE PRACTITIONER

## 2021-07-14 ENCOUNTER — TELEPHONE (OUTPATIENT)
Dept: CARDIOLOGY | Facility: CLINIC | Age: 61
End: 2021-07-14

## 2021-07-15 ENCOUNTER — TELEPHONE (OUTPATIENT)
Dept: PAIN MEDICINE | Facility: CLINIC | Age: 61
End: 2021-07-15

## 2021-07-15 NOTE — TELEPHONE ENCOUNTER
KEYON and sent Ipsumt message to patient giving her clearance by Rosario to stop her plavix 7 day sprior with her last dose being 7/18

## 2021-07-21 DIAGNOSIS — M48.02 CERVICAL SPINAL STENOSIS: Primary | ICD-10-CM

## 2021-07-26 ENCOUNTER — OUTSIDE FACILITY SERVICE (OUTPATIENT)
Dept: PAIN MEDICINE | Facility: CLINIC | Age: 61
End: 2021-07-26

## 2021-07-26 PROCEDURE — 99152 MOD SED SAME PHYS/QHP 5/>YRS: CPT | Performed by: ANESTHESIOLOGY

## 2021-07-26 PROCEDURE — 62321 NJX INTERLAMINAR CRV/THRC: CPT | Performed by: ANESTHESIOLOGY

## 2021-07-27 ENCOUNTER — TELEPHONE (OUTPATIENT)
Dept: PAIN MEDICINE | Facility: CLINIC | Age: 61
End: 2021-07-27

## 2021-07-27 NOTE — TELEPHONE ENCOUNTER
Attempted to contact pt regarding follow up appointment and how she was doing after yesterdays procedure. Unable to leave message.

## 2021-09-12 NOTE — PROGRESS NOTES
"Chief Complaint: \"I am having no neck pain, I have been having some pain numbness from my elbow to my right hand.\"    History of Present Illness:   Patient: Ms. Katelyn Wilson, 61 y.o. female originally referred by Dr. Curtis Toledo in consultation for chronic intractable neck pain. Patient reports a several year history of neck pain, which began without incident. She was last seen on July 26. 2021, when she underwent repeat cervical epidural steroid injection, from which she reports experiencing almost complete pain relief that is ongoing.  Currently she is reporting some pain extending from her right elbow into her right hand associated with some tingling and numbness.  Additionally on April 26, 2021, she underwent cervical epidural steroid injection, that provided her with experiencing almost complete pain relief and functional improvement lasting two and half months. She did participate in physical therapy. She returns today for postprocedure follow up and evaluation.   Pain Description: Previously a constant pain with intermittent exacerbation, described as aching, burning and dull sensation.   Radiation of Pain: Previously the pain radiated into the left shoulder and left arm, since most recent epidural she reports this is intermittent.  Pain intensity today: 8/10 (right elbow and right hand) 0/10 (neck)  Average pain intensity last week: 6/10  Pain intensity ranges from: 0/10 to 8/10  Aggravating factors: Pain increases with extension, rotation of the cervical spine.    Alleviating factors: Pain decreases with lying down   Associated Symptoms:   Patient reports numbness and tingling ending from her right elbow into her right hand.  She reports the numbness and weakness in the left upper extremity has resolved.   Patient denies any new bladder or bowel problems.   Patient denies difficulties with her balance or recent falls.   Patient reports bilateral cervicogenic and occipital headaches several " times per week lasting several hours.    Review of previous therapies and additional medical records:  Katelyn Wilson has already failed the following measures, including:   Conservative Measures: Oral analgesics, opioids, topical analgesics, ice, heat, massage, physical therapy   Interventional Measures: Had injections with Dr Armijo more than 4 years ago with relief  04/26/2021: KIERAN  07/26/2021: KIERAN  Surgical Measures: No history of previous cervical spine or shoulder surgery   Katelyn Wilson underwent neurosurgical consultation with Dr. Curtis Toledo on 03/15/2021, and was found not to be a surgical candidate.  Katelyn Wilson presents with significant comorbidities including hyperlipidemia, hypertension, history of TIA 2011, diabetes mellitus, gout, nephrolithiasis, obesity, migraines, dizziness, fibromyalgia, on Plavix  In terms of current analgesics, Katelyn Wilson takes: gabapentin, Norco, duloxetine.  Patient also takes Abilify, clonidine, quetiapine  I have reviewed [facility]uster Report #[id number] consistent with medication reconciliation.      Global Pain Scale 04-06  2021 05-25 2021         Pain 11 15         Feelings 6 2         Clinical outcomes 10 5         Activities 8 3         GPS Total: 35 25           Review of Diagnostic Studies:    MRI of the cervical spine without contrast 10/19/2020.  Images reveal vertebral body heights and alignment are maintained.  The cervical cord and signal are normal.  Multilevel cervical spondylosis.    C2-C3: Bilateral facet arthropathy.  No significant canal or foraminal stenosis  C3-C4: Disc osteophyte complex, bilateral facet arthropathy.  Mild bilateral foraminal stenosis  C4-C5: Disc osteophyte complex, bilateral facet arthropathy.  Mild bilateral foraminal stenosis  C5-C6: Disc osteophyte complex, bilateral facet arthropathy.  Mild canal stenosis and mild bilateral foraminal stenosis  C6-C7: Disc osteophyte complex slightly eccentric to the  left with associated spinal canal and moderate left foraminal stenosis  MRI of the brain without contrast 9/12/2019:  I have reviewed the images and the radiology report.  Essentially unremarkable      Review of Systems   HENT: Positive for drooling and postnasal drip.    Musculoskeletal: Positive for back pain, neck pain and neck stiffness.   Psychiatric/Behavioral: Nervous/anxious: depression.    All other systems reviewed and are negative.        Patient Active Problem List   Diagnosis   • Chest pain   • Hyperlipidemia LDL goal <100   • Syncope   • Obesity   • Stroke (CMS/HCC)   • Neck pain   • Bilateral carpal tunnel syndrome   • Chronic sinusitis   • Nephrolithiasis   • Cervical spinal stenosis   • Calculus of distal right ureter   • Aortic ectasia, thoracic (CMS/HCC)   • Cervical spondylosis without myelopathy   • Migraine without aura and without status migrainosus, not intractable   • Poor balance   • Dizziness   • Cardiac murmur   • Essential hypertension   • DDD (degenerative disc disease), cervical   • Cervical spondylosis with radiculopathy       Past Medical History:   Diagnosis Date   • Arthritis    • Asthma    • Ataxia    • Bladder infection    • Bronchitis    • Calculus of distal right ureter    • Cardiac murmur 6/26/2019   • Chest pain     [facility] feb 2003-near normal coronaries, Cardiolite 2007-Exercised for 7 minutes and 22 seconds-no evidence of ischemia, LVEF 84%, ER presentation on 3/17/16 Dr. 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    • Hyperlipidemia    • Hypertension    • Low back pain    • Muscle weakness (generalized)    • Neck  pain    • Obesity    • Osteoarthritis    • Osteoporosis    • Stroke (CMS/HCC)     MRI of brain  revealing Microvascular changes and evidence of 3mm, left basal fanglia lacunar infarct with initiation of Plavix   • Syncope     neg Tilt Table 2011   • Tendonitis    • Ureteral stone          Past Surgical History:   Procedure Laterality Date   • APPENDECTOMY     • CHOLECYSTECTOMY     • HYSTERECTOMY     • ROTATOR CUFF REPAIR Left          Family History   Problem Relation Age of Onset   • Colon cancer Mother    • Diabetes Mother    • Hypertension Mother    • Cancer Mother    • Heart disease Mother    • Colon cancer Father    • Hypertension Father    • Prostate cancer Father    • Arthritis Father    • Cancer Father    • Heart disease Father    • Diabetes Maternal Grandmother    • Hypertension Maternal Grandmother    • Diabetes Maternal Grandfather    • Prostate cancer Maternal Grandfather    • Hypertension Paternal Grandmother    • Lung cancer Paternal Grandfather    • Prostate cancer Paternal Grandfather    • Bleeding Disorder Brother          Social History     Socioeconomic History   • Marital status:      Spouse name: Not on file   • Number of children: Not on file   • Years of education: Not on file   • Highest education level: Not on file   Tobacco Use   • Smoking status: Former Smoker     Types: Cigarettes     Quit date:      Years since quittin.7   • Smokeless tobacco: Never Used   Substance and Sexual Activity   • Alcohol use: No   • Drug use: No   • Sexual activity: Defer           Current Outpatient Medications:   •  albuterol (PROAIR HFA) 108 (90 BASE) MCG/ACT inhaler, ProAir  (90 Base) MCG/ACT Inhalation Aerosol Solution; Patient Sig: ProAir  (90 Base) MCG/ACT Inhalation Aerosol Solution ; 8; 0; ; Active, Disp: , Rfl:   •  ARIPiprazole (ABILIFY) 10 MG tablet, Take 10 mg by mouth Daily., Disp: , Rfl: 2  •  CloNIDine (CATAPRES) 0.1 MG tablet, Take 0.1 mg by  mouth Daily., Disp: , Rfl: 2  •  clopidogrel (PLAVIX) 75 MG tablet, Take 75 mg by mouth Daily., Disp: , Rfl:   •  cyanocobalamin 1000 MCG/ML injection, Every 30 (Thirty) Days., Disp: , Rfl:   •  Dapagliflozin Propanediol (FARXIGA) 10 MG tablet, Take 10 mg by mouth Daily., Disp: , Rfl:   •  desloratadine (CLARINEX) 5 MG tablet, Take 5 mg by mouth Daily., Disp: , Rfl:   •  DULoxetine (CYMBALTA) 30 MG capsule, Take 30 mg by mouth Daily., Disp: , Rfl: 1  •  DULoxetine (CYMBALTA) 60 MG capsule, Take 60 mg by mouth Daily., Disp: , Rfl:   •  esomeprazole (nexIUM) 40 MG capsule, , Disp: , Rfl:   •  ezetimibe (ZETIA) 10 MG tablet, Take 10 mg by mouth Daily., Disp: , Rfl:   •  gabapentin (NEURONTIN) 600 MG tablet, Take 600 mg by mouth 3 (Three) Times a Day. 2 tabs three times a day, Disp: , Rfl:   •  HYDROcodone-acetaminophen (NORCO)  MG per tablet, Take 1 tablet by mouth As Needed., Disp: , Rfl: 0  •  lisinopril (PRINIVIL,ZESTRIL) 10 MG tablet, Take 10 mg by mouth Daily., Disp: , Rfl:   •  metFORMIN (GLUCOPHAGE) 1000 MG tablet, Take 1,000 mg by mouth 2 (Two) Times a Day With Meals., Disp: , Rfl:   •  metoprolol tartrate (LOPRESSOR) 25 MG tablet, Take 25 mg by mouth daily. 1/2 tablet daily, Disp: , Rfl:   •  montelukast (SINGULAIR) 10 MG tablet, Take 10 mg by mouth Every Night., Disp: , Rfl:   •  nitroglycerin (NITROSTAT) 0.4 MG SL tablet, Place 0.4 mg under the tongue As Needed for Chest Pain. Take no more than 3 doses in 15 minutes. , Disp: , Rfl:   •  olopatadine (PATADAY) 0.2 % solution ophthalmic solution, 1 drop Daily., Disp: , Rfl:   •  QUEtiapine (SEROquel) 25 MG tablet, Take 25 mg by mouth Every Night., Disp: , Rfl: 2  •  rosuvastatin (CRESTOR) 10 MG tablet, Take 10 mg by mouth Daily., Disp: , Rfl:   •  SITagliptin (JANUVIA) 100 MG tablet, Take 100 mg by mouth Daily., Disp: , Rfl:   •  vitamin D (ERGOCALCIFEROL) 1.25 MG (04854 UT) capsule capsule, Take 50,000 Units by mouth 1 (One) Time Per Week., Disp: , Rfl:   "     Allergies   Allergen Reactions   • Atorvastatin Myalgia     Causes muscle problems   • Estrogens Other (See Comments)     Elevates triglycerides   • Propoxyphene    • Diovan [Valsartan] Rash         /90   Pulse 71   Temp 98.4 °F (36.9 °C)   Resp 14   Ht 175.3 cm (69\")   Wt 107 kg (234 lb 12.8 oz)   SpO2 98%   BMI 34.67 kg/m²       Physical Exam:  Constitutional: Patient appears well-developed, well-nourished, well-hydrated  HEENT: Head: Normocephalic and atraumatic  Eyes: Conjunctivae and lids are normal  Pupils: Equal, round, reactive to light  Neck: Trachea normal. Neck supple. No JVD present.   Lymphatic: No cervical adenopathy  Pulmonary: No increased work of breathing or signs of respiratory distress.  Musculoskeletal   Gait and station: Gait evaluation demonstrated a normal gait.   Cervical spine: Passive and active range of motion are improved without pain. Extension, flexion, lateral flexion, rotation of the cervical spine not increase or reproduce pain. Cervical facet joint loading maneuvers are negative.  Muscles: Presence of active trigger points at the right levator scapulae   Shoulders: The range of motion of the glenohumeral joints is full and without pain. Rotator cuff strength is 5/5.   Neurological:   Patient is alert and oriented to person, place, and time.   Speech: Normal.   Cortical function: Normal mental status.   Cranial nerves 2-12: intact.   Reflex Scores:  Right brachioradialis: 2+  Left brachioradialis: 2+  Right biceps: 2+  Left biceps: 2+  Right triceps: 2+  Left triceps: 2+  Motor strength: 5/5  Motor Tone: Normal .   Involuntary movements: None.   Superficial/Primitive Reflexes: Primitive reflexes were absent.   Right Buchanan: Absent  Left Buchanan: Absent  Right ankle clonus: Absent  Left ankle clonus: Absent   Babinsky: Absent  Spurling sign: Negative. Neck tornado test: Negative. Lhermitte sign: Negative. Long tract signs: Negative.  Positive Tinel's sign at the " right elbow and right wrist.  Positive Phalen's test at the right wrist  Sensory exam: Intact to light touch, intact pain and temperature sensation, intact vibration sensation and normal proprioception.   Coordination: Normal finger to nose and heel to shin. Normal balance and negative Romberg's sign   Skin and subcutaneous tissue: Skin is warm and intact. No rash noted. No cyanosis.   Psychiatric: Judgment and insight: Normal. Orientation to person, place and time: Normal. Recent and remote memory: Intact. Mood and affect: Normal.     I have completed a physical examination and have updated or changed the appropriate documentation from previous visit, otherwise physical exam is unchanged.         ASSESSMENT:   1. Cervical spinal stenosis    2. Cervical spondylosis without myelopathy    3. DDD (degenerative disc disease), cervical    4. Migraine without aura and without status migrainosus, not intractable    5. Class 1 obesity due to excess calories without serious comorbidity in adult, unspecified BMI        PLAN/MEDICAL DECISION MAKING: Patient reports a several year history of neck pain. Patient also has a history of fibromyalgia. Patient has failed to obtain pain relief with conservative measures including oral analgesics and physical therapy.  She remains on chronic opioid therapy without significant improvement.  MRI of the cervical spine from October 2020 revealed diffuse spondylosis. Degenerative disc disease with disc protrusion particularly at C5-C6.  She responded significantly well to cervical epidural, as referenced under HPI.  He is reporting no neck pain today.  Her most prominent complaint is that of numbness and tingling extending from her right elbow into her right hand, her symptoms are typical of carpal tunnel syndrome.  She reports a history of carpal tunnel injections.  I will order a nerve conduction study to assess further.  She does have wristlet's, which I have advised her to wear at night  limit mobility of her wrist.  I will call her with the results of the study, regarding her neck she will follow-up as needed.  Patient has failed to obtain pain relief with conservative measures, as referenced above. I have reviewed all available patient's medical records as well as previous therapies as referenced above. I had a lengthy conversation with Ms. Katelyn Wilson regarding her chronic pain condition and potential therapeutic options including risks, benefits, alternative therapies, to name a few. Therefore, I have proposed the following plan:  1. Diagnostic studies:   A. EMG/NCV of the bilateral upper extremities   2. Pharmacological measures: Reviewed and discussed; Patient takes gabapentin, Norco, duloxetine.  Patient also takes Abilify, clonidine, quetiapine.   3. Interventional pain management measures: None indicated at this time.  Follow-up on as-needed basis.  If her neck pain recurs patient will need to stop clopidogrel (Plavix) at least 7 days between last dose and procedure (Dr. Ponce) to proceed with repeat cervical epidural steroid injection at C6-C7 by interlaminar approach. We may repeat epidural depending on patient's outcome or consider diagnostic and therapeutic left C5-C6 and left C6-7 transforaminal epidural steroid injections. Patient will follow-up with Dr. Toledo thereafter for possible cervical myelogram discussed surgical options.  I have discussed the possibility of with her of right carpal tunnel injections pending on results of EMG.  4. Long-term rehabilitation efforts:  A. The patient does not have a history of falls. I did complete a risk assessment for falls  B. Patient will start a comprehensive physical therapy program for upper body strengthening/posture correction, gait and balance training, neurodynamics, ultrasound, ASTYM, myofascial release, cupping and dry needling   C. Start an exercise program such as yoga and water therapy  D. Contrast therapy: Apply  ice-packs for 15-20 minutes, followed by heating pads for 15-20 minutes to affected area   5. The patient has been instructed to contact my office with any questions or difficulties. The patient understands the plan and agrees to proceed accordingly.        Patient Care Team:  Hawk Crowe NP as PCP - General (Family Medicine)  Marissa Nazario APRN as Referring Physician (Neurology)  Lucrecia Ponce MD as Consulting Physician (Cardiology)     No orders of the defined types were placed in this encounter.        Future Appointments   Date Time Provider Department Center   4/6/2022  1:30 PM Lucrecia Ponce MD [facility]         [facility] [place]     EMR Dragon/Transcription disclaimer:  Much of this encounter note is an electronic transcription of spoken language to printed text. Electronic transcription of spoken language may permit erroneous, or at times, nonsensical words or phrases to be inadvertently transcribed. Although I have reviewed the note for such errors, some may still exist.

## 2021-09-13 ENCOUNTER — OFFICE VISIT (OUTPATIENT)
Dept: PAIN MEDICINE | Facility: CLINIC | Age: 61
End: 2021-09-13

## 2021-09-13 VITALS
HEIGHT: 69 IN | BODY MASS INDEX: 34.78 KG/M2 | RESPIRATION RATE: 14 BRPM | OXYGEN SATURATION: 98 % | DIASTOLIC BLOOD PRESSURE: 90 MMHG | TEMPERATURE: 98.4 F | HEART RATE: 71 BPM | SYSTOLIC BLOOD PRESSURE: 130 MMHG | WEIGHT: 234.8 LBS

## 2021-09-13 DIAGNOSIS — E66.09 CLASS 1 OBESITY DUE TO EXCESS CALORIES WITHOUT SERIOUS COMORBIDITY IN ADULT, UNSPECIFIED BMI: ICD-10-CM

## 2021-09-13 DIAGNOSIS — G43.009 MIGRAINE WITHOUT AURA AND WITHOUT STATUS MIGRAINOSUS, NOT INTRACTABLE: ICD-10-CM

## 2021-09-13 DIAGNOSIS — M50.30 DDD (DEGENERATIVE DISC DISEASE), CERVICAL: ICD-10-CM

## 2021-09-13 DIAGNOSIS — M48.02 CERVICAL SPINAL STENOSIS: ICD-10-CM

## 2021-09-13 DIAGNOSIS — M47.812 CERVICAL SPONDYLOSIS WITHOUT MYELOPATHY: ICD-10-CM

## 2021-09-13 DIAGNOSIS — M48.02 CERVICAL SPINAL STENOSIS: Primary | ICD-10-CM

## 2021-09-13 PROCEDURE — 99213 OFFICE O/P EST LOW 20 MIN: CPT | Performed by: NURSE PRACTITIONER

## 2022-01-03 ENCOUNTER — TELEPHONE (OUTPATIENT)
Dept: PAIN MEDICINE | Facility: CLINIC | Age: 62
End: 2022-01-03

## 2022-01-03 NOTE — TELEPHONE ENCOUNTER
LVM for pt to call back and schedule a follow up with Areli.    HUB: Schedule Areli's next available!

## 2022-01-03 NOTE — TELEPHONE ENCOUNTER
Caller: Katelyn Wilson    Relationship to patient: Self    Best call back number: [phone]    Patient is needing: PATIENT CALLING IN TO SEE IF SHE CAN SCHEDULE AN INJECTION IN HER BACK. PLEASE ADVISE.

## 2022-03-11 ENCOUNTER — OFFICE VISIT (OUTPATIENT)
Dept: NEUROLOGY | Facility: CLINIC | Age: 62
End: 2022-03-11

## 2022-03-11 ENCOUNTER — LAB (OUTPATIENT)
Dept: LAB | Facility: HOSPITAL | Age: 62
End: 2022-03-11

## 2022-03-11 VITALS
BODY MASS INDEX: 34.07 KG/M2 | OXYGEN SATURATION: 96 % | HEIGHT: 69 IN | WEIGHT: 230 LBS | HEART RATE: 78 BPM | SYSTOLIC BLOOD PRESSURE: 120 MMHG | DIASTOLIC BLOOD PRESSURE: 80 MMHG | TEMPERATURE: 96.2 F

## 2022-03-11 DIAGNOSIS — R42 DIZZINESS: ICD-10-CM

## 2022-03-11 DIAGNOSIS — F41.9 ANXIETY: ICD-10-CM

## 2022-03-11 DIAGNOSIS — R41.3 MEMORY LOSS: ICD-10-CM

## 2022-03-11 DIAGNOSIS — F32.A DEPRESSION, UNSPECIFIED DEPRESSION TYPE: Primary | ICD-10-CM

## 2022-03-11 DIAGNOSIS — F32.A DEPRESSION, UNSPECIFIED DEPRESSION TYPE: ICD-10-CM

## 2022-03-11 LAB
25(OH)D3 SERPL-MCNC: 19.7 NG/ML (ref 30–100)
TSH SERPL DL<=0.05 MIU/L-ACNC: 2.49 UIU/ML (ref 0.27–4.2)

## 2022-03-11 PROCEDURE — 99214 OFFICE O/P EST MOD 30 MIN: CPT | Performed by: NURSE PRACTITIONER

## 2022-03-11 PROCEDURE — 84443 ASSAY THYROID STIM HORMONE: CPT

## 2022-03-11 PROCEDURE — 82306 VITAMIN D 25 HYDROXY: CPT

## 2022-03-11 PROCEDURE — 36415 COLL VENOUS BLD VENIPUNCTURE: CPT

## 2022-03-11 NOTE — PROGRESS NOTES
Subjective:     Patient ID: Katelyn Wilson is a 61 y.o. female.    CC:   Chief Complaint   Patient presents with   • Migraine   • Dizziness       HPI:   History of Present Illness     Ms. Wilson is a 59-year-old patient of Dr. Andrade here for follow-up.     She has been seeing Dr. Andrade for several years for migraines, fibromyalgia,, neck pain and problems with balance.  When she was seen by Dr. Andrade April 2018. Dr. Andrade did attempt to get an MRI of the brain due to problems with balance as well as an MRI of the C-spine due to balance issues complains of neck pain and left arm radiculopathy.  Apparently MRIs were denied.  She had not done physical therapy.   I attempted to get MRI of her cervical spine the last couple visits, this was eventually approved back in October 2020 showing some chronic changes with moderate left neuroforaminal narrowing. She did go to a few PT visits  Was last seen here in this clinic in January 2021, at that time her biggest complaint was problems with balance, head neck pain and dizziness.  She had had MRI of her C-spine and requested to see neurosurgery, she saw Dr. Toledo who thought she could be beginning to show myelopathic signs but started her back in physical therapy and sent her to pain management.  She tells me today that pain management is working well, she has not seen neurosurgery again, she gets injections which helped her.    Back in 2020 I did send her for neuropsych testing, she had this August 2021 at , findings showed multifactoral issues which were mostly identified a psychiatric due to depression anxiety and untreated sleep apnea, she does not wear her CPAP.  She is not seeing psychiatry but would like referral today.  She also would like a repeat scan of her brain because she has continued dizziness.  Carotid artery ultrasound previously performed a year ago showed 0 to 49% stenosis of the carotid arteries bilaterally  Dizziness is  unchanged  She denies any stroke symptoms, headaches are stable    The following portions of the patient's history were reviewed and updated as appropriate: allergies, current medications, past family history, past medical history, past social history, past surgical history and problem list.    Past Medical History:   Diagnosis Date   • Arthritis    • Asthma    • Ataxia    • Bladder infection    • Bronchitis    • Calculus of distal right ureter    • Cardiac murmur 2019   • Chest pain     [facility] 2003-near normal coronaries, Cardiolite -Exercised for 7 minutes and 22 seconds-no evidence of ischemia, LVEF 84%, ER presentation on 3/17/16 Dr. 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    • Hyperlipidemia    • Hypertension    • Low back pain    • Muscle weakness (generalized)    • Neck pain    • Obesity    • Osteoarthritis    • Osteoporosis    • Stroke (HCC)     MRI of brain  revealing Microvascular changes and evidence of 3mm, left basal fanglia lacunar infarct with initiation of Plavix   • Syncope     neg Tilt Table 2011   • Tendonitis    • Ureteral stone        Past Surgical History:   Procedure Laterality Date   • APPENDECTOMY     • CHOLECYSTECTOMY     • HYSTERECTOMY     • ROTATOR CUFF REPAIR Left        Social History     Socioeconomic History   • Marital status:    Tobacco Use   • Smoking status: Former Smoker     Types: Cigarettes     Quit date:      Years since quittin.2   • Smokeless tobacco: Never Used   Vaping Use   • Vaping Use: Never used   Substance and Sexual Activity   • Alcohol use: No   • Drug use: No   •  "Sexual activity: Defer       Family History   Problem Relation Age of Onset   • Colon cancer Mother    • Diabetes Mother    • Hypertension Mother    • Cancer Mother    • Heart disease Mother    • Colon cancer Father    • Hypertension Father    • Prostate cancer Father    • Arthritis Father    • Cancer Father    • Heart disease Father    • Diabetes Maternal Grandmother    • Hypertension Maternal Grandmother    • Diabetes Maternal Grandfather    • Prostate cancer Maternal Grandfather    • Hypertension Paternal Grandmother    • Lung cancer Paternal Grandfather    • Prostate cancer Paternal Grandfather    • Bleeding Disorder Brother         Review of Systems   Constitutional: Negative.    Eyes: Negative.    Respiratory: Negative.    Cardiovascular: Negative.    Gastrointestinal: Negative.    Endocrine: Negative.    Genitourinary: Negative.    Musculoskeletal: Negative.    Skin: Negative.    Allergic/Immunologic: Negative.    Neurological: Positive for dizziness and headaches. Negative for tremors, seizures, syncope, facial asymmetry, speech difficulty, weakness, light-headedness and numbness.   Hematological: Negative.    Psychiatric/Behavioral: Negative.         Objective:  /80   Pulse 78   Temp 96.2 °F (35.7 °C)   Ht 175.3 cm (69\")   Wt 104 kg (230 lb)   SpO2 96%   BMI 33.97 kg/m²     Neurologic Exam     Mental Status   Oriented to person, place, and time.   MMSE 29/30       Cranial Nerves   Cranial nerves II through XII intact.     CN III, IV, VI   Pupils are equal, round, and reactive to light.    Motor Exam   Muscle bulk: normal  Overall muscle tone: normal  Right arm pronator drift: absent  Left arm pronator drift: absent    Strength   Strength 5/5 throughout.     Gait, Coordination, and Reflexes     Gait  Gait: normal    Coordination   Finger to nose coordination: normal    Tremor   Resting tremor: absent  Intention tremor: absent  Action tremor: absent    Reflexes   Reflexes 2+ except as noted. "       Physical Exam  Vitals reviewed.   Constitutional:       General: She is not in acute distress.     Appearance: She is well-developed. She is obese. She is not ill-appearing or toxic-appearing.   HENT:      Head: Normocephalic and atraumatic.   Eyes:      General: No scleral icterus.     Conjunctiva/sclera: Conjunctivae normal.      Pupils: Pupils are equal, round, and reactive to light.   Pulmonary:      Effort: Pulmonary effort is normal. No respiratory distress.   Musculoskeletal:      Cervical back: Normal range of motion and neck supple.   Skin:     General: Skin is warm.      Capillary Refill: Capillary refill takes less than 2 seconds.   Neurological:      Mental Status: She is alert and oriented to person, place, and time.      Coordination: Finger-Nose-Finger Test normal.      Gait: Gait is intact.      Deep Tendon Reflexes: Strength normal.   Psychiatric:         Mood and Affect: Mood normal.         Behavior: Behavior normal.         Thought Content: Thought content normal.         Judgment: Judgment normal.         Assessment/Plan:       Diagnoses and all orders for this visit:    1. Depression, unspecified depression type (Primary)  -     Ambulatory Referral to Psychiatry  -     Vitamin D 25 Hydroxy; Future  -     TSH; Future    2. Anxiety  -     Ambulatory Referral to Psychiatry    3. Memory loss  -     Ambulatory Referral to Speech Therapy    4. Dizziness  -     MRI Brain With & Without Contrast; Future    Discussed neuropsychology report, she admits that she struggles significantly with depression and anxiety, referral placed to psychiatry for counseling and possible medication management.  She continues to have some issues with her memory, will check MRI of the brain however I do feel like her depression and anxiety are playing a significant role, referral to speech therapy.  MMSE reassuring at 29/30  Headaches are stable.  We will see her back in about 3 to 4 months but I encouraged her to  reach out with any questions concerns or problems prior to follow-up appointment           Reviewed medications, potential side effects and signs and symptoms to report. Discussed risk versus benefits of treatment plan with patient and/or family-including medications, labs and radiology that may be ordered. Addressed questions and concerns during visit. Patient and/or family verbalized understanding and agree with plan.    AS THE PROVIDER, I PERSONALLY WORE PPE DURING ENTIRE FACE TO FACE ENCOUNTER IN CLINIC WITH THE PATIENT. PATIENT ALSO WORE PPE DURING ENTIRE FACE TO FACE ENCOUNTER EXCEPT FOR A MAX OF 30 SECONDS DURING NEUROLOGICAL EVALUATION OF CRANIAL NERVES AND THEN MASK WAS PLACED BACK OVER PATIENT FACE FOR REMAINDER OF VISIT. I WASHED MY HANDS BEFORE AND AFTER VISIT.    During this visit the following were done:  Labs Reviewed []    Labs Ordered []    Radiology Reports Reviewed []    Radiology Ordered []    PCP Records Reviewed []    Referring Provider Records Reviewed []    ER Records Reviewed []    Hospital Records Reviewed []    History Obtained From Family []    Radiology Images Reviewed []    Other Reviewed []    Records Requested []      Marissa Nazario, OTTO  3/16/2022

## 2022-03-14 ENCOUNTER — TELEPHONE (OUTPATIENT)
Dept: NEUROLOGY | Facility: CLINIC | Age: 62
End: 2022-03-14

## 2022-03-14 NOTE — TELEPHONE ENCOUNTER
If she is taking rx strength vitamin d weekly and her level is still this low she needs to discuss this with her PCP  Please fax copy of labs to her PCP and make sure they are aware

## 2022-03-14 NOTE — TELEPHONE ENCOUNTER
Ashlee Zepeda of message and labs. She takes the 50,000 units once a week of the Vit d and she has been doing that for years she said.

## 2022-03-14 NOTE — TELEPHONE ENCOUNTER
----- Message from OTTO Perry sent at 3/14/2022  7:56 AM EDT -----  Please let pt know her vitamin D is low.  Has she ever taken a vitamin D supplement?  Thyroid lab stable

## 2022-03-14 NOTE — TELEPHONE ENCOUNTER
Called and left message for patient to contact her PCP regarding her Vit D level. Copy of labs faxed to PCP

## 2022-04-06 ENCOUNTER — OFFICE VISIT (OUTPATIENT)
Dept: CARDIOLOGY | Facility: CLINIC | Age: 62
End: 2022-04-06

## 2022-04-06 VITALS
OXYGEN SATURATION: 93 % | SYSTOLIC BLOOD PRESSURE: 122 MMHG | WEIGHT: 225 LBS | DIASTOLIC BLOOD PRESSURE: 78 MMHG | HEIGHT: 69 IN | HEART RATE: 81 BPM | BODY MASS INDEX: 33.33 KG/M2

## 2022-04-06 DIAGNOSIS — E78.5 HYPERLIPIDEMIA LDL GOAL <100: ICD-10-CM

## 2022-04-06 DIAGNOSIS — R55 SYNCOPE, UNSPECIFIED SYNCOPE TYPE: ICD-10-CM

## 2022-04-06 DIAGNOSIS — I77.810 AORTIC ECTASIA, THORACIC: Primary | ICD-10-CM

## 2022-04-06 DIAGNOSIS — I10 ESSENTIAL HYPERTENSION: ICD-10-CM

## 2022-04-06 PROCEDURE — 99213 OFFICE O/P EST LOW 20 MIN: CPT | Performed by: NURSE PRACTITIONER

## 2022-04-06 NOTE — PROGRESS NOTES
[facility] Cardiology    Patient ID: Katelyn Wilson is a 61 y.o. female.  : 1960   Contact: [phone]    Encounter date: 2022    PCP: Hawk Crowe NP      Chief complaint:   Chief Complaint   Patient presents with   • Chest Pain   • Shortness of Breath   • Heart Murmur     Problem List:  1. Chest pain with mixed features of angina pectoris  a. [facility], [place], 2003:  Revealing near normal coronaries  b. Cardiolite GXT, 07:  Exercise for 7 minutes and 22 seconds reaching target heart rate with no chest pain or severe dyspnea and no evidence of ischemia.  Peak blood pressure 160/90 and EF of 84%  c. Normal echocardiogram 2010  d. Normal dobutamine stress echo, 2010  e. [facility], also, 3/17/16: Near normal coronary arteries, no evidence of hemodynamically significant coronary artery disease, left ejection fraction 65%, incidental aneurysm noted with CT follow-up recommended  f. Echo 19: EF 60%, sclerotic aortic valve without stenosis, trace AI, trace MR, trace to mild TR.   2. Thoracic aortic aneurysm  a. CT angiogram of chest 3/17/16: Mild ectasia of the ascending aorta measuring 3.4 x 3.8 cm, no acute chest pathology  b. CT of chest, 18: Ascending aorta is upper limits of normal diameter, 4 cm   3. Syncope  a. Normal EEG, 11  b. Normal Holter, 11  c. Negative tilt table, 11  4. Carotid stenosis   a. Carotid duplex, 2021: Bilateral stenosis of 0-49%. Minimal carotid atherosclerosis bilaterally.  5. Hypertension  6. Dyslipidemia  7. Obesity  8. Fibromyalgia  9. Osteoarthritis  10. Borderline diabetes mellitus      Allergies   Allergen Reactions   • Atorvastatin Myalgia     Causes muscle problems   • Estrogens Other (See Comments)     Elevates triglycerides   • Propoxyphene    • Diovan [Valsartan] Rash       Current Medications:    Current Outpatient Medications:   •  albuterol sulfate  (90 Base) MCG/ACT inhaler,  ProAir  (90 Base) MCG/ACT Inhalation Aerosol Solution; Patient Sig: ProAir  (90 Base) MCG/ACT Inhalation Aerosol Solution ; 8; 0; 20-Feb-2014; Active, Disp: , Rfl:   •  ARIPiprazole (ABILIFY) 10 MG tablet, Take 10 mg by mouth Daily., Disp: , Rfl: 2  •  CloNIDine (CATAPRES) 0.1 MG tablet, Take 0.1 mg by mouth Daily., Disp: , Rfl: 2  •  clopidogrel (PLAVIX) 75 MG tablet, Take 75 mg by mouth Daily., Disp: , Rfl:   •  cyanocobalamin 1000 MCG/ML injection, Every 30 (Thirty) Days., Disp: , Rfl:   •  Dapagliflozin Propanediol 10 MG tablet, Take 10 mg by mouth Daily., Disp: , Rfl:   •  desloratadine (CLARINEX) 5 MG tablet, Take 5 mg by mouth Daily., Disp: , Rfl:   •  DULoxetine (CYMBALTA) 30 MG capsule, Take 30 mg by mouth Daily., Disp: , Rfl: 1  •  DULoxetine (CYMBALTA) 60 MG capsule, Take 60 mg by mouth Daily., Disp: , Rfl:   •  esomeprazole (nexIUM) 40 MG capsule, Take 40 mg by mouth Every Morning Before Breakfast., Disp: , Rfl:   •  ezetimibe (ZETIA) 10 MG tablet, Take 10 mg by mouth Daily., Disp: , Rfl:   •  gabapentin (NEURONTIN) 600 MG tablet, Take 600 mg by mouth 3 (Three) Times a Day. 2 tabs three times a day, Disp: , Rfl:   •  HYDROcodone-acetaminophen (NORCO)  MG per tablet, Take 1 tablet by mouth As Needed., Disp: , Rfl: 0  •  lisinopril (PRINIVIL,ZESTRIL) 10 MG tablet, Take 10 mg by mouth Daily., Disp: , Rfl:   •  metFORMIN (GLUCOPHAGE) 1000 MG tablet, Take 1,000 mg by mouth 2 (Two) Times a Day With Meals., Disp: , Rfl:   •  metoprolol tartrate (LOPRESSOR) 25 MG tablet, Take 25 mg by mouth Daily. 1/2 tablet daily, Disp: , Rfl:   •  montelukast (SINGULAIR) 10 MG tablet, Take 10 mg by mouth Every Night., Disp: , Rfl:   •  olopatadine (PATADAY) 0.2 % solution ophthalmic solution, 1 drop Daily., Disp: , Rfl:   •  QUEtiapine (SEROquel) 25 MG tablet, Take 25 mg by mouth Every Night., Disp: , Rfl: 2  •  rosuvastatin (CRESTOR) 10 MG tablet, Take 10 mg by mouth Daily., Disp: , Rfl:   •  SITagliptin  "(JANUVIA) 100 MG tablet, Take 100 mg by mouth Daily., Disp: , Rfl:   •  vitamin D (ERGOCALCIFEROL) 1.25 MG (18017 UT) capsule capsule, Take 50,000 Units by mouth 1 (One) Time Per Week., Disp: , Rfl:   •  nitroglycerin (NITROSTAT) 0.4 MG SL tablet, Place 0.4 mg under the tongue As Needed for Chest Pain. Take no more than 3 doses in 15 minutes., Disp: , Rfl:     HPI    Katelyn Wilson is a 61 y.o. female who presents today for an annual follow up of thoracic aortic ectasia, syncope, and cardiac risk factors. Since last visit, patient has done well overall. BP controlled. No chest pain, PND, orthopnea, edema.        The following portions of the patient's history were reviewed and updated as appropriate: allergies, current medications and problem list.    Pertinent positives as listed in the HPI.  All other systems reviewed are negative.         Vitals:    04/06/22 1523   BP: 122/78   BP Location: Right arm   Patient Position: Sitting   Pulse: 81   SpO2: 93%   Weight: 102 kg (225 lb)   Height: 175.3 cm (69\")       Physical Exam:  General: Alert and oriented.  Neck: Jugular venous pressure is within normal limits. Carotids have normal upstrokes without bruits.   Cardiovascular: Heart has a nondisplaced focal PMI. Regular rate and rhythm. No murmur, gallop or rub.  Lungs: Clear, no rales or wheezes. Equal expansion is noted.   Extremities: Show no edema.  Skin: Warm and dry.  Neurologic: Nonfocal.     Diagnostic Data (reviewed with patient):      Procedures      Assessment:    ICD-10-CM ICD-9-CM   1. Aortic ectasia, thoracic (HCC)  I77.810 447.71   2. Syncope, unspecified syncope type  R55 780.2   3. Essential hypertension  I10 401.9   4. Hyperlipidemia LDL goal <100  E78.5 272.4         Plan:  1. CTA chest to f/u on aortic thoracic aneurysm   2. Continue Lisinopril, metoprolol and clonidine for HTN.   3. Continue all other current medications.  4. F/up in 12 months, sooner if needed.      Electronically signed by " Tracey Perez, OTTO, 04/06/22, 3:48 PM EDT.

## 2022-04-09 ENCOUNTER — HOSPITAL ENCOUNTER (OUTPATIENT)
Dept: MRI IMAGING | Facility: HOSPITAL | Age: 62
Discharge: HOME | End: 2022-04-09
Admitting: NURSE PRACTITIONER

## 2022-04-09 DIAGNOSIS — R42 DIZZINESS: ICD-10-CM

## 2022-04-09 PROCEDURE — 82565 ASSAY OF CREATININE: CPT

## 2022-04-09 PROCEDURE — 70553 MRI BRAIN STEM W/O & W/DYE: CPT

## 2022-04-09 PROCEDURE — A9577 INJ MULTIHANCE: HCPCS | Performed by: NURSE PRACTITIONER

## 2022-04-09 PROCEDURE — 0 GADOBENATE DIMEGLUMINE 529 MG/ML SOLUTION: Performed by: NURSE PRACTITIONER

## 2022-04-09 RX ADMIN — GADOBENATE DIMEGLUMINE 20 ML: 529 INJECTION, SOLUTION INTRAVENOUS at 10:51

## 2022-04-11 ENCOUNTER — TELEPHONE (OUTPATIENT)
Dept: NEUROLOGY | Facility: CLINIC | Age: 62
End: 2022-04-11

## 2022-04-11 NOTE — PROGRESS NOTES
Please notify Katelyn that the MRI of her brain is normal and stable compared to prior imaging from 2019.  I agree with radiology interpretation.  She should follow-up in clinic with OTTO Cole as scheduled.  Thanks, OTTO Ramirez.    Fax copy to pcp thanks

## 2022-04-11 NOTE — TELEPHONE ENCOUNTER
----- Message from OTTO Fields sent at 4/11/2022  7:57 AM EDT -----  Please notify Kaetlyn that the MRI of her brain is normal and stable compared to prior imaging from 2019.  I agree with radiology interpretation.  She should follow-up in clinic with OTTO Cole as scheduled.  Thanks, OTTO Ramirez.    Fax copy to pcp thanks

## 2022-04-15 LAB — CREAT BLDA-MCNC: 0.6 MG/DL (ref 0.6–1.3)

## 2022-04-19 ENCOUNTER — HOSPITAL ENCOUNTER (OUTPATIENT)
Dept: CT IMAGING | Facility: HOSPITAL | Age: 62
Discharge: HOME | End: 2022-04-19
Admitting: NURSE PRACTITIONER

## 2022-04-19 DIAGNOSIS — I77.810 AORTIC ECTASIA, THORACIC: ICD-10-CM

## 2022-04-19 LAB — CREAT BLDA-MCNC: 0.7 MG/DL (ref 0.6–1.3)

## 2022-04-19 PROCEDURE — 0 IOPAMIDOL PER 1 ML: Performed by: NURSE PRACTITIONER

## 2022-04-19 PROCEDURE — 71275 CT ANGIOGRAPHY CHEST: CPT

## 2022-04-19 PROCEDURE — 82565 ASSAY OF CREATININE: CPT

## 2022-04-19 RX ADMIN — IOPAMIDOL 95 ML: 755 INJECTION, SOLUTION INTRAVENOUS at 13:07

## 2022-08-24 ENCOUNTER — TELEPHONE (OUTPATIENT)
Dept: NEUROLOGY | Facility: CLINIC | Age: 62
End: 2022-08-24

## 2022-08-31 ENCOUNTER — HOSPITAL ENCOUNTER (OUTPATIENT)
Dept: MAMMOGRAPHY | Facility: HOSPITAL | Age: 62
Discharge: HOME | End: 2022-08-31
Payer: COMMERCIAL

## 2022-08-31 VITALS — HEIGHT: 69 IN | WEIGHT: 213 LBS | BODY MASS INDEX: 31.55 KG/M2

## 2022-08-31 DIAGNOSIS — Z12.31 BREAST CANCER SCREENING BY MAMMOGRAM: ICD-10-CM

## 2022-08-31 PROCEDURE — 77063 BREAST TOMOSYNTHESIS BI: CPT

## 2022-09-07 ENCOUNTER — TELEPHONE (OUTPATIENT)
Dept: CARDIOLOGY | Facility: CLINIC | Age: 62
End: 2022-09-07

## 2022-09-07 NOTE — TELEPHONE ENCOUNTER
Pt requesting cardiac risk assessment for planned knee replacement surgery with Dr. Rivas @ [facility] (fax [phone])    PT with no complaints. Last seen April 2022

## 2023-01-17 ENCOUNTER — OFFICE VISIT (OUTPATIENT)
Dept: NEUROLOGY | Facility: CLINIC | Age: 63
End: 2023-01-17
Payer: COMMERCIAL

## 2023-01-17 VITALS
HEIGHT: 69 IN | WEIGHT: 201 LBS | BODY MASS INDEX: 29.77 KG/M2 | OXYGEN SATURATION: 96 % | SYSTOLIC BLOOD PRESSURE: 128 MMHG | DIASTOLIC BLOOD PRESSURE: 80 MMHG | HEART RATE: 88 BPM

## 2023-01-17 DIAGNOSIS — R41.9 COGNITIVE COMPLAINTS: Primary | ICD-10-CM

## 2023-01-17 DIAGNOSIS — F32.A ANXIETY AND DEPRESSION: ICD-10-CM

## 2023-01-17 DIAGNOSIS — F41.9 ANXIETY AND DEPRESSION: ICD-10-CM

## 2023-01-17 DIAGNOSIS — G43.009 MIGRAINE WITHOUT AURA AND WITHOUT STATUS MIGRAINOSUS, NOT INTRACTABLE: ICD-10-CM

## 2023-01-17 DIAGNOSIS — G47.33 OBSTRUCTIVE SLEEP APNEA SYNDROME: ICD-10-CM

## 2023-01-17 DIAGNOSIS — E55.9 VITAMIN D DEFICIENCY: ICD-10-CM

## 2023-01-17 PROCEDURE — 99214 OFFICE O/P EST MOD 30 MIN: CPT | Performed by: NURSE PRACTITIONER

## 2023-01-17 NOTE — LETTER
January 17, 2023     ZACKARY Duff  [address] [place], [place] [id number]    Patient: Katelyn Wilson   YOB: 1960   Date of Visit: 1/17/2023       Dear ZACKARY Duff    Katelyn Wilson was in my office today. Below is a copy of my note.    If you have questions, please do not hesitate to call me. I look forward to following Katelyn along with you.         Sincerely,        OTTO Fields        CC: OTTO Morales MD    Subjective:     Patient ID: Katelyn Wilson is a 62 y.o. female.    CC:   Chief Complaint   Patient presents with   • Memory Loss       HPI:   History of Present Illness   Today 1/17/2023- This is a 62-year-old female who presents for 10-month neurology follow-up, with known depression, anxiety, memory loss, and dizziness complaints. She was previously seen in clinic on 03/11/2022 by OTTO Cole. She has been followed in clinic by Dr. Edvin Andrade, neurology for many years in regards to migraines, fibromyalgia, chronic neck and balance difficulties. She did have neuropsychological testing in 08/2021 at the [facility]. Her previous low vitamin D level was treated with high dose vitamin D. At previous visit, she was referred to psychiatry for further treatments.    She denies seeing psychiatry because she could not find anyone close to her home. She confirms that her primary care provider is managing her care for anxiety and depression and it is well controlled in regards to medications, but she would like to meet with a counselor.     She feels that her memory has remained about the same since her last visit. She reports that since she retired in 2011 after her transient ischemic attack, she cannot remember how to do things she did before or anything from her childhood. Her highest level of education is beyond a Master's degree in education. She denies any trouble with driving. She lives with her  ". She independently manages her medications and her finances. She will plan to follow with primary care for her low vitamin D.    She confirms following with cardiology.    She reports that she has not been able to tolerate the CPAP. She adds that she has tried 4 or 5 masks, but she could not get used to it. She confirms that she has a dentist and will discuss the option for the oral appliance with them.    She reports that her migraines stopped when she stopped smoking. She states that she had a series of severe headaches after she had the transient ischemic attack. She adds that she intermittently has headaches that are not bad. She states that the dizziness \"comes and goes\" and she has done physical therapy in the past.    She reports that her diabetes mellitus type 2 is better.    She states that her fibromyalgia \"comes and goes\" and she has \"good days and bad days.\"     She confirms that she is on gabapentin &  hydrocodone. She confirms that she is still taking Seroquel 25 mg at night, duloxetine 60 mg and 30 mg, vitamin B12 injections, Abilify 10 mg, clonidine for sleep, Plavix and rosuvastatin.      Family history: Her maternal grandmother had Alzheimer's or dementia.    Past surgical history: She notes that she had right knee arthroplasty.    The following portions of the patient's history were reviewed and updated as appropriate: allergies, current medications, past family history, past medical history, past social history, past surgical history and problem list.    Past Medical History:   Diagnosis Date   • Arthritis    • Asthma    • Ataxia    • Bladder infection    • Bronchitis    • Calculus of distal right ureter    • Cardiac murmur 06/26/2019   • Chest pain     [facility] feb 2003-near normal coronaries, Cardiolite 2007-Exercised for 7 minutes and 22 seconds-no evidence of ischemia, LVEF 84%, ER presentation on 3/17/16 Dr. 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    • Hyperlipidemia    • Hypertension    • Low back pain    • Memory loss    • Migraine    • Muscle weakness (generalized)    • Neck pain    • Obesity    • Osteoarthritis    • Osteoporosis    • Sleep apnea    • Stroke (HCC)     MRI of brain  revealing Microvascular changes and evidence of 3mm, left basal fanglia lacunar infarct with initiation of Plavix   • Syncope     neg Tilt Table 2011   • Tendonitis    • TIA (transient ischemic attack)    • Ureteral stone        Past Surgical History:   Procedure Laterality Date   • APPENDECTOMY     • CHOLECYSTECTOMY     • HYSTERECTOMY     • REPLACEMENT TOTAL KNEE Right 11/15/2022   • ROTATOR CUFF REPAIR Left        Social History     Socioeconomic History   • Marital status:    Tobacco Use   • Smoking status: Former     Packs/day: 1.00     Years: 10.00     Pack years: 10.00     Types: Cigarettes     Quit date: 1995     Years since quittin.0   • Smokeless tobacco: Never   Vaping Use   • Vaping Use: Never used   Substance and Sexual Activity   • Alcohol use: No   • Drug use: No   • Sexual activity: Yes     Partners: Male     Birth control/protection: Surgical, Hysterectomy       Family History   Problem Relation Age of Onset   • Colon cancer Mother    • Diabetes Mother    • Hypertension Mother    • Cancer Mother    • Heart disease Mother    • Migraines Mother    • Colon cancer Father    • Hypertension Father    • Prostate cancer Father    • Arthritis Father    • Cancer Father    • Heart disease Father    • Diabetes Maternal Grandmother    •  Hypertension Maternal Grandmother    • Migraines Maternal Grandmother    • Diabetes Maternal Grandfather    • Prostate cancer Maternal Grandfather    • Hypertension Paternal Grandmother    • Lung cancer Paternal Grandfather    • Prostate cancer Paternal Grandfather    • Bleeding Disorder Brother           Current Outpatient Medications:   •  albuterol sulfate  (90 Base) MCG/ACT inhaler, ProAir  (90 Base) MCG/ACT Inhalation Aerosol Solution; Patient Sig: ProAir  (90 Base) MCG/ACT Inhalation Aerosol Solution ; 8; 0; 20-Feb-2014; Active, Disp: , Rfl:   •  ARIPiprazole (ABILIFY) 10 MG tablet, Take 10 mg by mouth Daily., Disp: , Rfl: 2  •  CloNIDine (CATAPRES) 0.1 MG tablet, Take 0.1 mg by mouth Daily., Disp: , Rfl: 2  •  clopidogrel (PLAVIX) 75 MG tablet, Take 75 mg by mouth Daily., Disp: , Rfl:   •  cyanocobalamin 1000 MCG/ML injection, Every 30 (Thirty) Days., Disp: , Rfl:   •  Dapagliflozin Propanediol 10 MG tablet, Take 10 mg by mouth Daily., Disp: , Rfl:   •  desloratadine (CLARINEX) 5 MG tablet, Take 5 mg by mouth Daily., Disp: , Rfl:   •  DULoxetine (CYMBALTA) 30 MG capsule, Take 30 mg by mouth Daily., Disp: , Rfl: 1  •  DULoxetine (CYMBALTA) 60 MG capsule, Take 60 mg by mouth Daily., Disp: , Rfl:   •  esomeprazole (nexIUM) 40 MG capsule, Take 40 mg by mouth Every Morning Before Breakfast., Disp: , Rfl:   •  ezetimibe (ZETIA) 10 MG tablet, Take 10 mg by mouth Daily., Disp: , Rfl:   •  gabapentin (NEURONTIN) 600 MG tablet, Take 600 mg by mouth 3 (Three) Times a Day. 2 tabs three times a day, Disp: , Rfl:   •  HYDROcodone-acetaminophen (NORCO)  MG per tablet, Take 1 tablet by mouth As Needed., Disp: , Rfl: 0  •  lisinopril (PRINIVIL,ZESTRIL) 10 MG tablet, Take 10 mg by mouth Daily., Disp: , Rfl:   •  metFORMIN (GLUCOPHAGE) 1000 MG tablet, Take 1,000 mg by mouth 2 (Two) Times a Day With Meals., Disp: , Rfl:   •  metoprolol tartrate (LOPRESSOR) 25 MG tablet, Take 25 mg by mouth Daily. 1/2  "tablet daily, Disp: , Rfl:   •  montelukast (SINGULAIR) 10 MG tablet, Take 10 mg by mouth Every Night., Disp: , Rfl:   •  QUEtiapine (SEROquel) 25 MG tablet, Take 25 mg by mouth Every Night., Disp: , Rfl: 2  •  rosuvastatin (CRESTOR) 10 MG tablet, Take 10 mg by mouth Daily., Disp: , Rfl:   •  SITagliptin (JANUVIA) 100 MG tablet, Take 100 mg by mouth Daily., Disp: , Rfl:      Review of Systems   Constitutional: Negative for chills, fatigue, fever and unexpected weight change.   HENT: Negative for ear pain, hearing loss, nosebleeds, rhinorrhea and sore throat.    Eyes: Negative for photophobia, pain, discharge, itching and visual disturbance.   Respiratory: Positive for apnea. Negative for cough, chest tightness, shortness of breath and wheezing.    Cardiovascular: Negative for chest pain, palpitations and leg swelling.   Gastrointestinal: Negative for abdominal pain, blood in stool, constipation, diarrhea, nausea and vomiting.   Genitourinary: Negative for dysuria, frequency, hematuria and urgency.   Musculoskeletal: Positive for arthralgias. Negative for back pain, gait problem, joint swelling, myalgias, neck pain and neck stiffness.   Skin: Negative for rash and wound.   Allergic/Immunologic: Negative for environmental allergies and food allergies.   Neurological: Negative for dizziness, tremors, seizures, syncope, speech difficulty, weakness, light-headedness, numbness and headaches.   Hematological: Negative for adenopathy. Does not bruise/bleed easily.   Psychiatric/Behavioral: Positive for dysphoric mood and sleep disturbance. Negative for agitation, confusion, decreased concentration, hallucinations and suicidal ideas. The patient is nervous/anxious.    All other systems reviewed and are negative.       Objective:  /80   Pulse 88   Ht 175.3 cm (69\")   Wt 91.2 kg (201 lb)   SpO2 96%   BMI 29.68 kg/m²     Neurologic Exam     Mental Status   Oriented to person, place, and time.   Registration: recalls " 3 of 3 objects. Recall at 5 minutes: recalls 2 of 3 objects. Follows 3 step commands.   Attention: normal. Concentration: normal.   Speech: speech is normal   Level of consciousness: alert  Knowledge: good.   Abnormal comprehension.     Cranial Nerves   Cranial nerves II through XII intact.     Motor Exam   Muscle bulk: normal  Overall muscle tone: normal    Strength   Strength 5/5 throughout.     Gait, Coordination, and Reflexes     Gait  Gait: (antalgia d/t chronic right knee pain, no assistive device)    Coordination   Finger to nose coordination: normal    Tremor   Resting tremor: absent  Intention tremor: absent  Action tremor: absent    Reflexes   Reflexes 2+ except as noted.   Right : 2+  Left : 2+      Physical Exam  Constitutional:       Appearance: Normal appearance.   Neurological:      Mental Status: She is alert and oriented to person, place, and time.      Cranial Nerves: Cranial nerves 2-12 are intact.      Motor: Motor strength is normal.      Coordination: Finger-Nose-Finger Test normal.   Psychiatric:         Mood and Affect: Mood is anxious and depressed.         Speech: Speech normal.         Behavior: Behavior normal.         Thought Content: Thought content normal.         Cognition and Memory: She exhibits impaired recent memory (reported) and impaired remote memory (reported).         Judgment: Judgment normal.     MMSE today is 28 out of 30, with 2 out of 3 for word recall, previously 29 out of 30.    Results:    Neuropsychological testing at  in 08/2021 showed multifactorial issues with mostly identified psychiatric difficulties due to depression, anxiety, and untreated sleep apnea.      MRI of the brain with and without contrast on 04/09/2022, stable and essentially normal contrasted MRI of the brain, a few minimal chronic small vessel ischemic changes. No abnormal contrast enhancement.    Recent laboratory results show vitamin D level 19.7, low and TSH  normal.    Assessment/Plan:      Diagnoses and all orders for this visit:    1. Cognitive complaints (Primary)  Comments:  MOCA next visit    2. Migraine without aura and without status migrainosus, not intractable  Comments:  very rare    3. Obstructive sleep apnea syndrome  Comments:  to discuss with dentist about oral appliance    4. Anxiety and depression  -     Ambulatory Referral to Behavioral Health    5. Vitamin D deficiency  Comments:  repeat testing with PCP recommended    She is going to have her vitamin D level rechecked with her primary care provider and start vitamin D 2000 international units daily over the counter for maintenance.     Also recommended she reach out to her dentist about a consultation for oral appliance to treat sleep apnea since she has tried 5 different masks and cannot tolerate the CPAP.     Also, we will complete [place] [facility] evaluation the next time she comes into clinic since she has over a master's degree in education. We did discuss that several of her medications could be contributing to her cognitive complaints as well.    We have also referred her to [facility] in [place], [place] and I also provided her with the phone number to make an appointment today.     She verbalizes understanding and agrees with the plan moving forward. She will call us with any additional questions or concerns prior to follow up in the clinic.    Reviewed medications, potential side effects and signs and symptoms to report. Discussed risk versus benefits of treatment plan with patient and/or family-including medications, labs and radiology that may be ordered. Addressed questions and concerns during visit. Patient and/or family verbalized understanding and agree with plan.    AS THE PROVIDER, I PERSONALLY WORE PPE DURING ENTIRE FACE TO FACE ENCOUNTER IN CLINIC WITH THE PATIENT. PATIENT ALSO WORE PPE DURING ENTIRE FACE TO FACE ENCOUNTER EXCEPT FOR A MAX  OF 30 SECONDS DURING NEUROLOGICAL EVALUATION OF CRANIAL NERVES AND THEN MASK WAS PLACED BACK OVER PATIENT FACE FOR REMAINDER OF VISIT. I WASHED MY HANDS BEFORE AND AFTER VISIT.    During this visit the following were done:  Labs Reviewed [x]    Labs Ordered []    Radiology Reports Reviewed [x]    Radiology Ordered []    PCP Records Reviewed []    Referring Provider Records Reviewed []    ER Records Reviewed []    Hospital Records Reviewed []    History Obtained From Family []    Radiology Images Reviewed [x]    Other Reviewed [x]  Prior neuro notes and records  Records Requested []      Transcribed from ambient dictation for OTTO Fields by Zayra Joya.  01/17/23   13:02 EST    Patient or patient representative verbalized consent to the visit recording.  I have personally performed the services described in this document as transcribed by the above individual, and it is both accurate and complete.  OTTO Fields  1/17/2023  13:24 EST

## 2023-01-17 NOTE — PROGRESS NOTES
Subjective:     Patient ID: Katelyn Wilson is a 62 y.o. female.    CC:   Chief Complaint   Patient presents with   • Memory Loss       HPI:   History of Present Illness   Today 1/17/2023- This is a 62-year-old female who presents for 10-month neurology follow-up, with known depression, anxiety, memory loss, and dizziness complaints. She was previously seen in clinic on 03/11/2022 by OTTO Cole. She has been followed in clinic by Dr. Edvin Andrade, neurology for many years in regards to migraines, fibromyalgia, chronic neck and balance difficulties. She did have neuropsychological testing in 08/2021 at the [facility]. Her previous low vitamin D level was treated with high dose vitamin D. At previous visit, she was referred to psychiatry for further treatments.    She denies seeing psychiatry because she could not find anyone close to her home. She confirms that her primary care provider is managing her care for anxiety and depression and it is well controlled in regards to medications, but she would like to meet with a counselor.     She feels that her memory has remained about the same since her last visit. She reports that since she retired in 2011 after her transient ischemic attack, she cannot remember how to do things she did before or anything from her childhood. Her highest level of education is beyond a Master's degree in education. She denies any trouble with driving. She lives with her . She independently manages her medications and her finances. She will plan to follow with primary care for her low vitamin D.    She confirms following with cardiology.    She reports that she has not been able to tolerate the CPAP. She adds that she has tried 4 or 5 masks, but she could not get used to it. She confirms that she has a dentist and will discuss the option for the oral appliance with them.    She reports that her migraines stopped when she stopped smoking. She states  "that she had a series of severe headaches after she had the transient ischemic attack. She adds that she intermittently has headaches that are not bad. She states that the dizziness \"comes and goes\" and she has done physical therapy in the past.    She reports that her diabetes mellitus type 2 is better.    She states that her fibromyalgia \"comes and goes\" and she has \"good days and bad days.\"     She confirms that she is on gabapentin &  hydrocodone. She confirms that she is still taking Seroquel 25 mg at night, duloxetine 60 mg and 30 mg, vitamin B12 injections, Abilify 10 mg, clonidine for sleep, Plavix and rosuvastatin.      Family history: Her maternal grandmother had Alzheimer's or dementia.    Past surgical history: She notes that she had right knee arthroplasty.    The following portions of the patient's history were reviewed and updated as appropriate: allergies, current medications, past family history, past medical history, past social history, past surgical history and problem list.    Past Medical History:   Diagnosis Date   • Arthritis    • Asthma    • Ataxia    • Bladder infection    • Bronchitis    • Calculus of distal right ureter    • Cardiac murmur 06/26/2019   • Chest pain     [facility] [place] feb 2003-near normal coronaries, Cardiolite 2007-Exercised for 7 minutes and 22 seconds-no evidence of ischemia, LVEF 84%, ER presentation on 3/17/16 Dr. 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    • Hyperlipidemia    • Hypertension    • Low back pain    • Memory loss 2011   • Migraine "    • Muscle weakness (generalized)    • Neck pain    • Obesity    • Osteoarthritis    • Osteoporosis    • Sleep apnea    • Stroke (HCC)     MRI of brain  revealing Microvascular changes and evidence of 3mm, left basal fanglia lacunar infarct with initiation of Plavix   • Syncope     neg Tilt Table 2011   • Tendonitis    • TIA (transient ischemic attack)    • Ureteral stone        Past Surgical History:   Procedure Laterality Date   • APPENDECTOMY     • CHOLECYSTECTOMY     • HYSTERECTOMY     • REPLACEMENT TOTAL KNEE Right 11/15/2022   • ROTATOR CUFF REPAIR Left        Social History     Socioeconomic History   • Marital status:    Tobacco Use   • Smoking status: Former     Packs/day: 1.00     Years: 10.00     Pack years: 10.00     Types: Cigarettes     Quit date: 1995     Years since quittin.0   • Smokeless tobacco: Never   Vaping Use   • Vaping Use: Never used   Substance and Sexual Activity   • Alcohol use: No   • Drug use: No   • Sexual activity: Yes     Partners: Male     Birth control/protection: Surgical, Hysterectomy       Family History   Problem Relation Age of Onset   • Colon cancer Mother    • Diabetes Mother    • Hypertension Mother    • Cancer Mother    • Heart disease Mother    • Migraines Mother    • Colon cancer Father    • Hypertension Father    • Prostate cancer Father    • Arthritis Father    • Cancer Father    • Heart disease Father    • Diabetes Maternal Grandmother    • Hypertension Maternal Grandmother    • Migraines Maternal Grandmother    • Diabetes Maternal Grandfather    • Prostate cancer Maternal Grandfather    • Hypertension Paternal Grandmother    • Lung cancer Paternal Grandfather    • Prostate cancer Paternal Grandfather    • Bleeding Disorder Brother           Current Outpatient Medications:   •  albuterol sulfate  (90 Base) MCG/ACT inhaler, ProAir  (90 Base) MCG/ACT Inhalation Aerosol Solution; Patient Sig: ProAir  (90 Base)  MCG/ACT Inhalation Aerosol Solution ; 8; 0; 20-Feb-2014; Active, Disp: , Rfl:   •  ARIPiprazole (ABILIFY) 10 MG tablet, Take 10 mg by mouth Daily., Disp: , Rfl: 2  •  CloNIDine (CATAPRES) 0.1 MG tablet, Take 0.1 mg by mouth Daily., Disp: , Rfl: 2  •  clopidogrel (PLAVIX) 75 MG tablet, Take 75 mg by mouth Daily., Disp: , Rfl:   •  cyanocobalamin 1000 MCG/ML injection, Every 30 (Thirty) Days., Disp: , Rfl:   •  Dapagliflozin Propanediol 10 MG tablet, Take 10 mg by mouth Daily., Disp: , Rfl:   •  desloratadine (CLARINEX) 5 MG tablet, Take 5 mg by mouth Daily., Disp: , Rfl:   •  DULoxetine (CYMBALTA) 30 MG capsule, Take 30 mg by mouth Daily., Disp: , Rfl: 1  •  DULoxetine (CYMBALTA) 60 MG capsule, Take 60 mg by mouth Daily., Disp: , Rfl:   •  esomeprazole (nexIUM) 40 MG capsule, Take 40 mg by mouth Every Morning Before Breakfast., Disp: , Rfl:   •  ezetimibe (ZETIA) 10 MG tablet, Take 10 mg by mouth Daily., Disp: , Rfl:   •  gabapentin (NEURONTIN) 600 MG tablet, Take 600 mg by mouth 3 (Three) Times a Day. 2 tabs three times a day, Disp: , Rfl:   •  HYDROcodone-acetaminophen (NORCO)  MG per tablet, Take 1 tablet by mouth As Needed., Disp: , Rfl: 0  •  lisinopril (PRINIVIL,ZESTRIL) 10 MG tablet, Take 10 mg by mouth Daily., Disp: , Rfl:   •  metFORMIN (GLUCOPHAGE) 1000 MG tablet, Take 1,000 mg by mouth 2 (Two) Times a Day With Meals., Disp: , Rfl:   •  metoprolol tartrate (LOPRESSOR) 25 MG tablet, Take 25 mg by mouth Daily. 1/2 tablet daily, Disp: , Rfl:   •  montelukast (SINGULAIR) 10 MG tablet, Take 10 mg by mouth Every Night., Disp: , Rfl:   •  QUEtiapine (SEROquel) 25 MG tablet, Take 25 mg by mouth Every Night., Disp: , Rfl: 2  •  rosuvastatin (CRESTOR) 10 MG tablet, Take 10 mg by mouth Daily., Disp: , Rfl:   •  SITagliptin (JANUVIA) 100 MG tablet, Take 100 mg by mouth Daily., Disp: , Rfl:      Review of Systems   Constitutional: Negative for chills, fatigue, fever and unexpected weight change.   HENT: Negative  "for ear pain, hearing loss, nosebleeds, rhinorrhea and sore throat.    Eyes: Negative for photophobia, pain, discharge, itching and visual disturbance.   Respiratory: Positive for apnea. Negative for cough, chest tightness, shortness of breath and wheezing.    Cardiovascular: Negative for chest pain, palpitations and leg swelling.   Gastrointestinal: Negative for abdominal pain, blood in stool, constipation, diarrhea, nausea and vomiting.   Genitourinary: Negative for dysuria, frequency, hematuria and urgency.   Musculoskeletal: Positive for arthralgias. Negative for back pain, gait problem, joint swelling, myalgias, neck pain and neck stiffness.   Skin: Negative for rash and wound.   Allergic/Immunologic: Negative for environmental allergies and food allergies.   Neurological: Negative for dizziness, tremors, seizures, syncope, speech difficulty, weakness, light-headedness, numbness and headaches.   Hematological: Negative for adenopathy. Does not bruise/bleed easily.   Psychiatric/Behavioral: Positive for dysphoric mood and sleep disturbance. Negative for agitation, confusion, decreased concentration, hallucinations and suicidal ideas. The patient is nervous/anxious.    All other systems reviewed and are negative.       Objective:  /80   Pulse 88   Ht 175.3 cm (69\")   Wt 91.2 kg (201 lb)   SpO2 96%   BMI 29.68 kg/m²     Neurologic Exam     Mental Status   Oriented to person, place, and time.   Registration: recalls 3 of 3 objects. Recall at 5 minutes: recalls 2 of 3 objects. Follows 3 step commands.   Attention: normal. Concentration: normal.   Speech: speech is normal   Level of consciousness: alert  Knowledge: good.   Abnormal comprehension.     Cranial Nerves   Cranial nerves II through XII intact.     Motor Exam   Muscle bulk: normal  Overall muscle tone: normal    Strength   Strength 5/5 throughout.     Gait, Coordination, and Reflexes     Gait  Gait: (antalgia d/t chronic right knee pain, no " assistive device)    Coordination   Finger to nose coordination: normal    Tremor   Resting tremor: absent  Intention tremor: absent  Action tremor: absent    Reflexes   Reflexes 2+ except as noted.   Right : 2+  Left : 2+      Physical Exam  Constitutional:       Appearance: Normal appearance.   Neurological:      Mental Status: She is alert and oriented to person, place, and time.      Cranial Nerves: Cranial nerves 2-12 are intact.      Motor: Motor strength is normal.      Coordination: Finger-Nose-Finger Test normal.   Psychiatric:         Mood and Affect: Mood is anxious and depressed.         Speech: Speech normal.         Behavior: Behavior normal.         Thought Content: Thought content normal.         Cognition and Memory: She exhibits impaired recent memory (reported) and impaired remote memory (reported).         Judgment: Judgment normal.     MMSE today is 28 out of 30, with 2 out of 3 for word recall, previously 29 out of 30.    Results:    Neuropsychological testing at  in 08/2021 showed multifactorial issues with mostly identified psychiatric difficulties due to depression, anxiety, and untreated sleep apnea.      MRI of the brain with and without contrast on 04/09/2022, stable and essentially normal contrasted MRI of the brain, a few minimal chronic small vessel ischemic changes. No abnormal contrast enhancement.    Recent laboratory results show vitamin D level 19.7, low and TSH normal.    Assessment/Plan:       Diagnoses and all orders for this visit:    1. Cognitive complaints (Primary)  Comments:  MOCA next visit    2. Migraine without aura and without status migrainosus, not intractable  Comments:  very rare    3. Obstructive sleep apnea syndrome  Comments:  to discuss with dentist about oral appliance    4. Anxiety and depression  -     Ambulatory Referral to Behavioral Health    5. Vitamin D deficiency  Comments:  repeat testing with PCP recommended    She is going to have her  vitamin D level rechecked with her primary care provider and start vitamin D 2000 international units daily over the counter for maintenance.     Also recommended she reach out to her dentist about a consultation for oral appliance to treat sleep apnea since she has tried 5 different masks and cannot tolerate the CPAP.     Also, we will complete [place] [facility] evaluation the next time she comes into clinic since she has over a master's degree in education. We did discuss that several of her medications could be contributing to her cognitive complaints as well.    We have also referred her to [facility] in [place], [place] and I also provided her with the phone number to make an appointment today.     She verbalizes understanding and agrees with the plan moving forward. She will call us with any additional questions or concerns prior to follow up in the clinic.     Reviewed medications, potential side effects and signs and symptoms to report. Discussed risk versus benefits of treatment plan with patient and/or family-including medications, labs and radiology that may be ordered. Addressed questions and concerns during visit. Patient and/or family verbalized understanding and agree with plan.    AS THE PROVIDER, I PERSONALLY WORE PPE DURING ENTIRE FACE TO FACE ENCOUNTER IN CLINIC WITH THE PATIENT. PATIENT ALSO WORE PPE DURING ENTIRE FACE TO FACE ENCOUNTER EXCEPT FOR A MAX OF 30 SECONDS DURING NEUROLOGICAL EVALUATION OF CRANIAL NERVES AND THEN MASK WAS PLACED BACK OVER PATIENT FACE FOR REMAINDER OF VISIT. I WASHED MY HANDS BEFORE AND AFTER VISIT.    During this visit the following were done:  Labs Reviewed [x]    Labs Ordered []    Radiology Reports Reviewed [x]    Radiology Ordered []    PCP Records Reviewed []    Referring Provider Records Reviewed []    ER Records Reviewed []    Hospital Records Reviewed []    History Obtained From Family []    Radiology Images Reviewed  [x]    Other Reviewed [x]  Prior neuro notes and records  Records Requested []      Transcribed from ambient dictation for OTTO Fields by Zayra Joya.  01/17/23   13:02 EST    Patient or patient representative verbalized consent to the visit recording.  I have personally performed the services described in this document as transcribed by the above individual, and it is both accurate and complete.  OTTO Fields  1/17/2023  13:24 EST

## 2023-01-19 ENCOUNTER — PATIENT ROUNDING (BHMG ONLY) (OUTPATIENT)
Dept: NEUROLOGY | Facility: CLINIC | Age: 63
End: 2023-01-19
Payer: COMMERCIAL

## 2023-01-19 NOTE — PROGRESS NOTES
January 19, 2023    Hello, may I speak with Katelyn Wilson?    My name is [email or web address]      I am  with [facility]  [address]  [place] [address] [phone].    Before we get started may I verify your date of birth? 1960    I am calling to officially welcome you to our practice and ask about your recent visit. Is this a good time to talk?  Patient rounding sent through [employer].

## 2023-10-11 ENCOUNTER — TRANSCRIBE ORDERS (OUTPATIENT)
Dept: ADMINISTRATIVE | Facility: HOSPITAL | Age: 63
End: 2023-10-11
Payer: COMMERCIAL

## 2023-10-11 DIAGNOSIS — R10.9 LEFT FLANK PAIN: Primary | ICD-10-CM

## 2023-10-12 ENCOUNTER — HOSPITAL ENCOUNTER (OUTPATIENT)
Dept: CT IMAGING | Facility: HOSPITAL | Age: 63
Discharge: HOME | End: 2023-10-12
Admitting: PHYSICIAN ASSISTANT
Payer: COMMERCIAL

## 2023-10-12 DIAGNOSIS — R10.9 LEFT FLANK PAIN: ICD-10-CM

## 2023-10-12 PROCEDURE — 74176 CT ABD & PELVIS W/O CONTRAST: CPT

## 2023-10-19 ENCOUNTER — HOSPITAL ENCOUNTER (OUTPATIENT)
Facility: HOSPITAL | Age: 63
Discharge: HOME | End: 2023-10-19
Payer: COMMERCIAL

## 2023-10-19 ENCOUNTER — OFFICE VISIT (OUTPATIENT)
Dept: CARDIOLOGY | Facility: CLINIC | Age: 63
End: 2023-10-19
Payer: COMMERCIAL

## 2023-10-19 VITALS
DIASTOLIC BLOOD PRESSURE: 70 MMHG | BODY MASS INDEX: 30.35 KG/M2 | HEART RATE: 86 BPM | SYSTOLIC BLOOD PRESSURE: 128 MMHG | WEIGHT: 212 LBS | HEIGHT: 70 IN | OXYGEN SATURATION: 95 %

## 2023-10-19 DIAGNOSIS — I35.8 AORTIC VALVE SCLEROSIS: ICD-10-CM

## 2023-10-19 DIAGNOSIS — E78.5 HYPERLIPIDEMIA LDL GOAL <100: ICD-10-CM

## 2023-10-19 DIAGNOSIS — I77.810 AORTIC ECTASIA, THORACIC: Primary | ICD-10-CM

## 2023-10-19 DIAGNOSIS — I10 ESSENTIAL HYPERTENSION: ICD-10-CM

## 2023-10-19 PROCEDURE — 93005 ELECTROCARDIOGRAM TRACING: CPT

## 2023-10-19 PROCEDURE — 93000 ELECTROCARDIOGRAM COMPLETE: CPT | Performed by: INTERNAL MEDICINE

## 2023-10-19 PROCEDURE — 99213 OFFICE O/P EST LOW 20 MIN: CPT | Performed by: INTERNAL MEDICINE

## 2023-10-19 RX ORDER — LIRAGLUTIDE 6 MG/ML
1.2 INJECTION SUBCUTANEOUS DAILY
COMMUNITY
Start: 2023-09-11

## 2023-10-19 NOTE — PROGRESS NOTES
[facility]    Patient ID: Katelyn Wilson is a 63 y.o. female.  : 1960   Contact: [phone]    Encounter date: 10/19/2023    PCP: Nidia Osorio PA      Chief complaint:   Chief Complaint   Patient presents with    Heart Murmur    Shortness of Breath       Problem List:  Chest pain with mixed features of angina pectoris  [facility], 2003:  Revealing near normal coronaries  Cardiolite GXT, 07:  Exercise for 7 minutes and 22 seconds reaching target heart rate with no chest pain or severe dyspnea and no evidence of ischemia.  Peak blood pressure 160/90 and EF of 84%  Normal echocardiogram 2010  Normal dobutamine stress echo, 2010  [facility], also, 3/17/16: Near normal coronary arteries, no evidence of hemodynamically significant coronary artery disease, left ejection fraction 65%, incidental aneurysm noted with CT follow-up recommended  Echo 19: EF 60%, sclerotic aortic valve without stenosis, trace AI, trace MR, trace to mild TR.   Thoracic aortic aneurysm  CT angiogram of chest 3/17/16: Mild ectasia of the ascending aorta measuring 3.4 x 3.8 cm, no acute chest pathology  CT of chest, 18: Ascending aorta is upper limits of normal diameter, 4 cm   Syncope  Normal EEG, 11  Normal Holter, 11  Negative tilt table, 11  Carotid stenosis   Carotid duplex, 2021: Bilateral stenosis of 0-49%. Minimal carotid atherosclerosis bilaterally.  Hypertension  Dyslipidemia  Obesity  Fibromyalgia  Osteoarthritis  Borderline diabetes mellitus    Allergies   Allergen Reactions    Atorvastatin Myalgia     Causes muscle problems    Estrogens Other (See Comments)     Elevates triglycerides    Propoxyphene     Diovan [Valsartan] Rash       Current Medications:    Current Outpatient Medications:     albuterol sulfate  (90 Base) MCG/ACT inhaler, ProAir  (90 Base) MCG/ACT Inhalation Aerosol Solution; Patient Sig: ProAir   (90 Base) MCG/ACT Inhalation Aerosol Solution ; 8; 0; 20-Feb-2014; Active, Disp: , Rfl:     ARIPiprazole (ABILIFY) 10 MG tablet, Take 1 tablet by mouth Daily., Disp: , Rfl: 2    CloNIDine (CATAPRES) 0.1 MG tablet, Take 1 tablet by mouth Daily., Disp: , Rfl: 2    clopidogrel (PLAVIX) 75 MG tablet, Take 1 tablet by mouth Daily., Disp: , Rfl:     cyanocobalamin 1000 MCG/ML injection, Every 30 (Thirty) Days., Disp: , Rfl:     Dapagliflozin Propanediol 10 MG tablet, Take 10 mg by mouth Daily., Disp: , Rfl:     desloratadine (CLARINEX) 5 MG tablet, Take 1 tablet by mouth Daily., Disp: , Rfl:     DULoxetine (CYMBALTA) 30 MG capsule, Take 1 capsule by mouth Daily., Disp: , Rfl: 1    DULoxetine (CYMBALTA) 60 MG capsule, Take 1 capsule by mouth Daily., Disp: , Rfl:     esomeprazole (nexIUM) 40 MG capsule, Take 1 capsule by mouth Every Morning Before Breakfast., Disp: , Rfl:     ezetimibe (ZETIA) 10 MG tablet, Take 1 tablet by mouth Daily., Disp: , Rfl:     gabapentin (NEURONTIN) 600 MG tablet, Take 1 tablet by mouth 3 (Three) Times a Day. 2 tabs three times a day, Disp: , Rfl:     HYDROcodone-acetaminophen (NORCO)  MG per tablet, Take 1 tablet by mouth As Needed., Disp: , Rfl: 0    lisinopril (PRINIVIL,ZESTRIL) 10 MG tablet, Take 1 tablet by mouth Daily., Disp: , Rfl:     metFORMIN (GLUCOPHAGE) 1000 MG tablet, Take 1 tablet by mouth 2 (Two) Times a Day With Meals., Disp: , Rfl:     metoprolol tartrate (LOPRESSOR) 25 MG tablet, Take 1 tablet by mouth Daily. 1/2 tablet daily, Disp: , Rfl:     montelukast (SINGULAIR) 10 MG tablet, Take 1 tablet by mouth Every Night., Disp: , Rfl:     QUEtiapine (SEROquel) 25 MG tablet, Take 1 tablet by mouth Every Night., Disp: , Rfl: 2    rosuvastatin (CRESTOR) 10 MG tablet, Take 1 tablet by mouth Daily., Disp: , Rfl:     Victoza 18 MG/3ML solution pen-injector injection, Inject 1.2 mg under the skin into the appropriate area as directed Daily., Disp: , Rfl:     KAMILA Wilson  "is a 63 y.o. female who presents today for a follow up of thoracic aortic ectasia and cardiac risk factors. Since last visit, patient has been doing well overall from a cardiovascular standpoint. She reports that during a back XR, it was discovered that she has a kidney stone that will be broken up on Monday. Patient stays busy and active by walking 30 to 45 minutes daily. She notes a knee replacement last year in November. Patient denies chest pain, shortness of breath, orthopnea, palpitations, edema, dizziness, and syncope.            The following portions of the patient's history were reviewed and updated as appropriate: allergies, current medications and problem list.    Pertinent positives as listed in the HPI.  All other systems reviewed are negative.         Vitals:    10/19/23 1422   BP: 128/70   BP Location: Left arm   Patient Position: Sitting   Pulse: 86   SpO2: 95%   Weight: 96.2 kg (212 lb)   Height: 177.8 cm (70\")       Physical Exam:  General: Alert and oriented.  Neck: Jugular venous pressure is within normal limits. Carotids have normal upstrokes without bruits.   Cardiovascular: Heart has a nondisplaced focal PMI. Regular rate and rhythm. 2/6 SE murmur, no gallop or rub.  Lungs: Clear, no rales or wheezes. Equal expansion is noted.   Extremities: Show no edema.  Skin: Warm and dry.  Neurologic: Nonfocal.     Diagnostic Data (reviewed with patient):  Lab date: 07/20/2023  FLP: , , HDL 46, LDL 52  CMP: Glu 166, BUN 11, Creat 0.56, eGFR 103, Na 137, K 4.1, Cl 100, CO2 27, Ca 9.2, Alk Phos 83, AST 23, ALT 19  CBC: WBC 6.6, RBC 4.65, HGB 12.9, HCT 39.6, MCV 85.2, MCH 27.7,  (12/20/2022)      Advance Care Planning   ACP discussion was declined by the patient. Patient does not have an advance directive, declines further assistance.           ECG 12 Lead    Date/Time: 10/19/2023 2:31 PM  Performed by: Lucrecia Ponce MD    Authorized by: Lucrecia Ponce MD  Comparison: " compared with previous ECG from 9/14/2018  Similar to previous ECG  Rhythm: sinus rhythm  BPM: 77    Clinical impression: normal ECG            Assessment:    ICD-10-CM ICD-9-CM   1. Aortic ectasia, thoracic  I77.810 447.71   2. Essential hypertension  I10 401.9   3. Hyperlipidemia LDL goal <100  E78.5 272.4         Plan:  Echocardiogram ordered to reassess valvular function.   Continue on Plavix 75 mg daily for antiplatelet therapy.   Continue on Zetia 10 mg daily for hyperlipidemia.   Continue on lisinopril 10 mg daily for hypertension.   Continue on metoprolol 25 mg daily for rate control and hypertension.   Continue on rosuvastatin 10 mg daily for hyperlipidemia.   Continue all other current medications.  F/up in 12 months, sooner if needed.      Scribed for Lucrecia Ponce MD by Giovani Samuel. 10/19/2023 14:29 EDT    I Lucrecia Ponce MD personally performed the services described in this documentation as scribed by the above individual in my presence, and it is both accurate and complete.    Lucrecia Ponce MD, FACC

## 2023-10-23 ENCOUNTER — OFFICE VISIT (OUTPATIENT)
Dept: UROLOGY | Facility: CLINIC | Age: 63
End: 2023-10-23
Payer: COMMERCIAL

## 2023-10-23 ENCOUNTER — TELEPHONE (OUTPATIENT)
Dept: UROLOGY | Facility: CLINIC | Age: 63
End: 2023-10-23

## 2023-10-23 VITALS
HEIGHT: 70 IN | BODY MASS INDEX: 30.35 KG/M2 | WEIGHT: 212 LBS | OXYGEN SATURATION: 98 % | TEMPERATURE: 98.2 F | RESPIRATION RATE: 16 BRPM | HEART RATE: 82 BPM

## 2023-10-23 DIAGNOSIS — N20.0 KIDNEY STONE: Primary | ICD-10-CM

## 2023-10-23 LAB
BILIRUB BLD-MCNC: NEGATIVE MG/DL
CLARITY, POC: CLEAR
COLOR UR: YELLOW
EXPIRATION DATE: ABNORMAL
GLUCOSE UR STRIP-MCNC: ABNORMAL MG/DL
KETONES UR QL: NEGATIVE
LEUKOCYTE EST, POC: NEGATIVE
Lab: ABNORMAL
NITRITE UR-MCNC: NEGATIVE MG/ML
PH UR: 6 [PH] (ref 5–8)
PROT UR STRIP-MCNC: NEGATIVE MG/DL
RBC # UR STRIP: NEGATIVE /UL
SP GR UR: 1.01 (ref 1–1.03)
UROBILINOGEN UR QL: NORMAL

## 2023-10-23 PROCEDURE — 99204 OFFICE O/P NEW MOD 45 MIN: CPT | Performed by: UROLOGY

## 2023-10-23 PROCEDURE — 81003 URINALYSIS AUTO W/O SCOPE: CPT | Performed by: UROLOGY

## 2023-10-23 NOTE — PROGRESS NOTES
Chief Complaint  Kidney Stone    No ref. provider found    HPI  Ms. Wilson is a 63 y.o. female who presents for further management of nephrolithiasis.    She presented to her PCP and was diagnosed with a 11mm left prx ureteral stone. She presents today for follow up.  No fever, chills, nausea, vomiting.  No dysuria.      Past Medical History  Past Medical History:   Diagnosis Date    Arthritis     Asthma     Ataxia     Bladder infection     Bronchitis     Calculus of distal right ureter     Cardiac murmur 06/26/2019    Chest pain     [facility] feb 2003-near normal coronaries, Cardiolite 2007-Exercised for 7 minutes and 22 seconds-no evidence of ischemia, LVEF 84%, ER presentation on 3/17/16 Dr. 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     Hyperlipidemia     Hypertension     Low back pain     Memory loss 2011    Migraine 1975    Muscle weakness (generalized)     Neck pain     Obesity     Osteoarthritis     Osteoporosis     Sleep apnea 2004    Stroke     MRI of brain 2001 revealing Microvascular changes and evidence of 3mm, left basal fanglia lacunar infarct with initiation of Plavix    Syncope     neg Tilt Table 11/09/2011    Tendonitis     TIA (transient ischemic attack) 2011    Ureteral stone        Past Surgical History  Past Surgical History:   Procedure Laterality Date    APPENDECTOMY      CHOLECYSTECTOMY      HYSTERECTOMY      REPLACEMENT TOTAL KNEE Right 11/15/2022    ROTATOR CUFF REPAIR Left        Medications    Current Outpatient Medications:     albuterol sulfate  (90 Base) MCG/ACT inhaler,  ProAir  (90 Base) MCG/ACT Inhalation Aerosol Solution; Patient Sig: ProAir  (90 Base) MCG/ACT Inhalation Aerosol Solution ; 8; 0; 20-Feb-2014; Active, Disp: , Rfl:     ARIPiprazole (ABILIFY) 10 MG tablet, Take 1 tablet by mouth Daily., Disp: , Rfl: 2    CloNIDine (CATAPRES) 0.1 MG tablet, Take 1 tablet by mouth Daily., Disp: , Rfl: 2    clopidogrel (PLAVIX) 75 MG tablet, Take 1 tablet by mouth Daily., Disp: , Rfl:     cyanocobalamin 1000 MCG/ML injection, Every 30 (Thirty) Days., Disp: , Rfl:     Dapagliflozin Propanediol 10 MG tablet, Take 10 mg by mouth Daily., Disp: , Rfl:     desloratadine (CLARINEX) 5 MG tablet, Take 1 tablet by mouth Daily., Disp: , Rfl:     DULoxetine (CYMBALTA) 30 MG capsule, Take 1 capsule by mouth Daily., Disp: , Rfl: 1    DULoxetine (CYMBALTA) 60 MG capsule, Take 1 capsule by mouth Daily., Disp: , Rfl:     esomeprazole (nexIUM) 40 MG capsule, Take 1 capsule by mouth Every Morning Before Breakfast., Disp: , Rfl:     ezetimibe (ZETIA) 10 MG tablet, Take 1 tablet by mouth Daily., Disp: , Rfl:     gabapentin (NEURONTIN) 600 MG tablet, Take 1 tablet by mouth 3 (Three) Times a Day. 2 tabs three times a day, Disp: , Rfl:     HYDROcodone-acetaminophen (NORCO)  MG per tablet, Take 1 tablet by mouth As Needed., Disp: , Rfl: 0    lisinopril (PRINIVIL,ZESTRIL) 10 MG tablet, Take 1 tablet by mouth Daily., Disp: , Rfl:     metFORMIN (GLUCOPHAGE) 1000 MG tablet, Take 1 tablet by mouth 2 (Two) Times a Day With Meals., Disp: , Rfl:     metoprolol tartrate (LOPRESSOR) 25 MG tablet, Take 1 tablet by mouth Daily. 1/2 tablet daily, Disp: , Rfl:     montelukast (SINGULAIR) 10 MG tablet, Take 1 tablet by mouth Every Night., Disp: , Rfl:     QUEtiapine (SEROquel) 25 MG tablet, Take 1 tablet by mouth Every Night., Disp: , Rfl: 2    rosuvastatin (CRESTOR) 10 MG tablet, Take 1 tablet by mouth Daily., Disp: , Rfl:     Victoza 18 MG/3ML solution pen-injector injection, Inject 1.2 mg under the skin  "into the appropriate area as directed Daily., Disp: , Rfl:     Allergies  Allergies   Allergen Reactions    Atorvastatin Myalgia     Causes muscle problems    Atorvastatin Calcium Myalgia    Estrogens Other (See Comments)     Elevates triglycerides    Propoxyphene     Diovan [Valsartan] Rash       Social History  Social History     Socioeconomic History    Marital status:    Tobacco Use    Smoking status: Former     Packs/day: 1.00     Years: 10.00     Additional pack years: 0.00     Total pack years: 10.00     Types: Cigarettes     Quit date: 1995     Years since quittin.8    Smokeless tobacco: Never   Vaping Use    Vaping Use: Never used   Substance and Sexual Activity    Alcohol use: No    Drug use: No    Sexual activity: Yes     Partners: Male     Birth control/protection: Surgical, Hysterectomy       Family History  Family History   Problem Relation Age of Onset    Colon cancer Mother     Diabetes Mother     Hypertension Mother     Cancer Mother     Heart disease Mother     Migraines Mother     Colon cancer Father     Hypertension Father     Prostate cancer Father     Arthritis Father     Cancer Father     Heart disease Father     Diabetes Maternal Grandmother     Hypertension Maternal Grandmother     Migraines Maternal Grandmother     Diabetes Maternal Grandfather     Prostate cancer Maternal Grandfather     Hypertension Paternal Grandmother     Lung cancer Paternal Grandfather     Prostate cancer Paternal Grandfather     Bleeding Disorder Brother          Physical Exam  Visit Vitals  Pulse 82   Temp 98.2 °F (36.8 °C) (Temporal)   Resp 16   Ht 177.8 cm (70\")   Wt 96.2 kg (212 lb)   SpO2 98%   BMI 30.42 kg/m²     Physical exam was notable for obese.     Labs  Lab Results   Component Value Date    GLUCOSE 150 (H) 2018    BUN 10 2018    CREATININE 0.70 2022    EGFRIFNONA 77 2018    BCR 13.0 2018    K 4.1 2018    CO2 28.0 2018    CALCIUM 9.6 2018    " "ALBUMIN 4.68 09/14/2018    AST 54 (H) 09/14/2018    ALT 53 (H) 09/14/2018       Lab Results   Component Value Date    WBC 6.68 09/14/2018    HGB 15.8 (H) 09/14/2018    HCT 48.0 (H) 09/14/2018    MCV 90.7 09/14/2018     09/14/2018       No results found for: \"LDH\", \"URICACID\"    Lab Results   Component Value Date    CALCIUM 9.6 09/14/2018       Brief Urine Lab Results  (Last result in the past 365 days)        Color   Clarity   Blood   Leuk Est   Nitrite   Protein   CREAT   Urine HCG        10/23/23 1037 Yellow   Clear   Negative   Negative   Negative   Negative                   No results found for: \"URINECX\"    )No components found for: \"STONEANALYSI\"      Radiologic Studies  CT Abdomen Pelvis Without Contrast  Result Date: 10/12/2023  Left 11 mm stone just proximal to the left UPJ without significant hydronephrosis.  Left nephrolithiasis.   CTDI: 9.92 mGy DLP:508.62 mGy.cm  This report was signed and finalized on 10/12/2023 1:16 PM by Scarlett Cam MD.        I have personally reviewed these labs and images.      Assessment  Ms. Wilson is a 63 y.o. female with 11mm left proximal ureteral stone. Exacerbation of chronic disease. Hx of ESWL in 2017    We had informed discussion about the causes of stones, in the main treatments for nephrolithiasis.  The 3 main surgical treatments for stones are percutaneous nephrolithotomy, ureteroscopy and laser lithotripsy, and shockwave lithotripsy.  Based on patient factors and the stone size and location, I have recommended URS.  We discussed the risks, benefits, and alternatives to this therapy.  The main risks that we discussed were bleeding, urinary infection, damage to nearby structures, need for further procedures, and cardiopulmonary complications from anesthesia.  The patient voiced understanding and wished to proceed.     Plan  1. Schedule for Left URS/HLL stent at [facility]. Risks are blood thinner              "

## 2023-10-23 NOTE — TELEPHONE ENCOUNTER
Caller: Katelyn Wilson    Relationship to patient: Self    Best call back number: [phone]    Patient is needing: PT SAID SHE RECEIVED A CALL FROM STACY ABOUT A SURGERY SPOT FOR TOMORROW AND WANTS IT IF IT IS STILL AVAILABLE. PLEASE CALL PT BACK

## 2023-10-30 ENCOUNTER — HOSPITAL ENCOUNTER (OUTPATIENT)
Dept: NON INVASIVE DIAGNOSTICS | Facility: HOSPITAL | Age: 63
Discharge: HOME | End: 2023-10-30
Payer: COMMERCIAL

## 2023-10-30 PROCEDURE — 93306 TTE W/DOPPLER COMPLETE: CPT

## 2023-10-31 ENCOUNTER — LAB REQUISITION (OUTPATIENT)
Dept: LAB | Facility: HOSPITAL | Age: 63
End: 2023-10-31
Payer: COMMERCIAL

## 2023-10-31 DIAGNOSIS — N20.0 CALCULUS OF KIDNEY: ICD-10-CM

## 2023-10-31 PROCEDURE — 82365 CALCULUS SPECTROSCOPY: CPT | Performed by: UROLOGY

## 2023-11-06 ENCOUNTER — PROCEDURE VISIT (OUTPATIENT)
Dept: UROLOGY | Facility: CLINIC | Age: 63
End: 2023-11-06
Payer: COMMERCIAL

## 2023-11-06 DIAGNOSIS — N20.0 KIDNEY STONE: Primary | ICD-10-CM

## 2023-11-06 PROCEDURE — 52310 CYSTOSCOPY AND TREATMENT: CPT | Performed by: UROLOGY

## 2023-11-06 NOTE — PROGRESS NOTES
Preoperative diagnosis  Foreign body in genitourinary tract    Postoperative diagnosis  Foreign body in genitourinary tract    Procedure  Flexible cystourethroscopy with stent removal    Attending surgeon  Josh Conley MD    Anesthesia  2% lidocaine jelly intraurethrally    Complications  None    Indications  63 y.o. female who is status post ureteroscopy with laser lithotripsy who presents for stent removal.    Procedure  Detailed information of all possible complications and side effects were discussed with the patient.  Informed consent was obtained. Patient was given one dose of antibiotics. The patient was placed in supine position and a timeout was performed. The patient was prepped and draped in sterile fashion.  Next, 2% lidocaine jelly was bluntly injected per urethra without difficulty. The 14 Turkmen flexible cystoscope was passed through the urethra and into the bladder.  The stent was visualized, grasped and removed in its entirety.  The patient tolerated the procedure well.    Plan  1. Provided education regarding water intake of at least 2 liters per day  2. F/u in 8 weeks with a renal ultrasound with JOSE

## 2023-11-08 LAB
CALCIUM OXALATE DIHYDRATE MFR STONE IR: 20 %
COLOR STONE: NORMAL
COM MFR STONE: 80 %
COMPN STONE: NORMAL
LABORATORY COMMENT REPORT: NORMAL
LABORATORY COMMENT REPORT: NORMAL
Lab: NORMAL
Lab: NORMAL
PHOTO: NORMAL
SIZE STONE: NORMAL MM
SPEC SOURCE SUBJ: NORMAL
WT STONE: 21 MG

## 2024-01-02 ENCOUNTER — TELEPHONE (OUTPATIENT)
Dept: UROLOGY | Facility: CLINIC | Age: 64
End: 2024-01-02
Payer: COMMERCIAL

## 2024-01-02 NOTE — TELEPHONE ENCOUNTER
Caller: Katelyn Wilson    Relationship: Self    Best call back number: [phone]    What form or medical record are you requesting: US RENAL BILATERAL ORDER    Who is requesting this form or medical record from you: [facility]    How would you like to receive the form or medical records (pick-up, mail, fax): FAX  If fax, what is the fax number: [phone]    Timeframe paperwork needed: ASAP    Additional notes: PLEASE FAX US RENAL BILATERAL ORDER .[phone].

## 2024-02-01 ENCOUNTER — TELEPHONE (OUTPATIENT)
Dept: UROLOGY | Facility: CLINIC | Age: 64
End: 2024-02-01
Payer: COMMERCIAL

## 2024-02-01 NOTE — TELEPHONE ENCOUNTER
Caller: Katelyn Wilson    Relationship: Self    Best call back number: [phone]    Caller requesting test results: PT    What test was performed: CT    When was the test performed: 2ND WEEK IN JANUARY    Where was the test performed: [facility]    Additional notes: PLEASE CALL PT WITH TEST RESULTS. THANK YOU

## 2024-02-02 NOTE — TELEPHONE ENCOUNTER
Called patient and scheduled appt on Feb. 12th @ 2pm to discuss US results per Dr. Josh Conley MD

## 2024-02-12 ENCOUNTER — OFFICE VISIT (OUTPATIENT)
Dept: UROLOGY | Facility: CLINIC | Age: 64
End: 2024-02-12
Payer: COMMERCIAL

## 2024-02-12 VITALS
SYSTOLIC BLOOD PRESSURE: 112 MMHG | WEIGHT: 212 LBS | DIASTOLIC BLOOD PRESSURE: 68 MMHG | HEIGHT: 70 IN | BODY MASS INDEX: 30.35 KG/M2

## 2024-02-12 DIAGNOSIS — N20.0 KIDNEY STONE: Primary | ICD-10-CM

## 2024-02-12 PROBLEM — E11.9 DIABETES MELLITUS: Chronic | Status: ACTIVE | Noted: 2024-02-12

## 2024-02-12 PROBLEM — M54.50 LOW BACK PAIN: Status: ACTIVE | Noted: 2023-08-16

## 2024-02-12 PROBLEM — G25.81 RESTLESS LEGS: Status: ACTIVE | Noted: 2023-07-24

## 2024-02-12 LAB
BILIRUB BLD-MCNC: NEGATIVE MG/DL
CLARITY, POC: CLEAR
COLOR UR: YELLOW
EXPIRATION DATE: ABNORMAL
GLUCOSE UR STRIP-MCNC: ABNORMAL MG/DL
KETONES UR QL: NEGATIVE
LEUKOCYTE EST, POC: NEGATIVE
Lab: ABNORMAL
NITRITE UR-MCNC: NEGATIVE MG/ML
PH UR: 6 [PH] (ref 5–8)
PROT UR STRIP-MCNC: ABNORMAL MG/DL
RBC # UR STRIP: NEGATIVE /UL
SP GR UR: 1.01 (ref 1–1.03)
UROBILINOGEN UR QL: NORMAL

## 2024-02-12 PROCEDURE — 81003 URINALYSIS AUTO W/O SCOPE: CPT | Performed by: NURSE PRACTITIONER

## 2024-02-12 PROCEDURE — 99213 OFFICE O/P EST LOW 20 MIN: CPT | Performed by: NURSE PRACTITIONER

## 2024-03-08 ENCOUNTER — OFFICE VISIT (OUTPATIENT)
Dept: NEUROLOGY | Facility: CLINIC | Age: 64
End: 2024-03-08
Payer: COMMERCIAL

## 2024-03-08 ENCOUNTER — SPECIALTY PHARMACY (OUTPATIENT)
Dept: NEUROLOGY | Facility: CLINIC | Age: 64
End: 2024-03-08
Payer: COMMERCIAL

## 2024-03-08 VITALS
DIASTOLIC BLOOD PRESSURE: 90 MMHG | WEIGHT: 210 LBS | HEIGHT: 70 IN | SYSTOLIC BLOOD PRESSURE: 140 MMHG | HEART RATE: 61 BPM | BODY MASS INDEX: 30.06 KG/M2 | OXYGEN SATURATION: 90 %

## 2024-03-08 DIAGNOSIS — G25.81 RESTLESS LEGS: ICD-10-CM

## 2024-03-08 DIAGNOSIS — F51.04 PSYCHOPHYSIOLOGICAL INSOMNIA: ICD-10-CM

## 2024-03-08 DIAGNOSIS — R29.6 RECURRENT FALLS WHILE WALKING: ICD-10-CM

## 2024-03-08 DIAGNOSIS — Z86.73 PERSONAL HISTORY OF TRANSIENT ISCHEMIC ATTACK (TIA), AND CEREBRAL INFARCTION WITHOUT RESIDUAL DEFICITS: ICD-10-CM

## 2024-03-08 DIAGNOSIS — G47.33 OBSTRUCTIVE SLEEP APNEA SYNDROME: ICD-10-CM

## 2024-03-08 DIAGNOSIS — R41.9 COGNITIVE COMPLAINTS: ICD-10-CM

## 2024-03-08 DIAGNOSIS — R26.89 IMPAIRMENT OF BALANCE: ICD-10-CM

## 2024-03-08 DIAGNOSIS — G43.009 MIGRAINE WITHOUT AURA AND WITHOUT STATUS MIGRAINOSUS, NOT INTRACTABLE: Primary | ICD-10-CM

## 2024-03-08 PROCEDURE — 99214 OFFICE O/P EST MOD 30 MIN: CPT | Performed by: NURSE PRACTITIONER

## 2024-03-08 RX ORDER — ROPINIROLE 0.5 MG/1
0.5 TABLET, FILM COATED ORAL
COMMUNITY
Start: 2024-03-06

## 2024-03-08 RX ORDER — PANTOPRAZOLE SODIUM 40 MG/1
1 TABLET, DELAYED RELEASE ORAL DAILY
COMMUNITY
Start: 2023-12-11

## 2024-03-08 NOTE — PROGRESS NOTES
"Subjective:     Patient ID: Katelyn Wilson is a 63 y.o. female.    CC:   Chief Complaint   Patient presents with    cognitive complaint    Transient Ischemic Attack     2011       HPI:   History of Present Illness  Today 3/8/2024-  This is a pleasant 63-year-old female who I last saw in clinic on 01/17/2023 for known depression and anxiety, reports of memory loss, and dizziness present for many years. She also has a diagnosis of migraines, fibromyalgia, chronic neck and balance difficulties.      She did undergo formal neuropsychological testing in 08/2021 with high concern of psychiatric difficulties with depression, anxiety, and untreated sleep apnea. I did refer her to behavioral health last visit to establish care. She is here for follow-up and reevaluation of symptoms today.     Today, she reports she is doing well. She has not met with psychiatry yet. Her memory has been about the same.      She is not wearing a CPAP. She has not met with a sleep specialist regarding other options. She confirms she is seeing a dentist. She has not discussed an oral appliance with her dentist, but she has thought about this. She sees her dentist next week. She states she has trouble with sleep and is \"up and down\" every night. She rarely sleeps through the night.     She has been diagnosed with restless legs syndrome since her last visit. She was prescribed ropinirole yesterday, 03/07/2024, by her podiatrist.     She reports her headaches are \"pretty good.\" She had a severe headache on Wednesday, 03/06/2024, but that was the first one she experienced in \"a while.\" She does not take anything for the headaches because nothing is helpful. She lays down with ice. She previously took Imitrex injections a long time ago. She has never taken Ubrelvy or Nurtec. She experiences photophobia, phonophobia, and throbbing pain. She has experienced vomiting in the past. Her migraine pain is typically located on the left side of her face. " "She experiences a migraine every other month, and she always feels \"weird and foggy\" afterwards. Triptans are contraindicated with CV risk factors.    She has intermittent fibromyalgia pain. She notes she has \"good days and bad days.\" She confirms she is seeing a pain specialist. She was sent to an orthopedic surgeon for injections in her neck. She is trying to obtain an MRI of her cervical spine. She has pain shooting through her arms that her specialist believes is from her neck. She is doing exercises at home and was told she had to do this for 6 weeks. She previously had an MRI of the cervical spine on 02/17/2016 which showed mild disc changes. She also had an MRI of the cervical spine in 2014 which showed disc changes. She was told her recent cervical x-ray showed significant changes. Her orthopedic specialist is Dr. Hyatt in [place], [place].    She reports she falls often. She does not know why she falls but notes she trips. She sometimes has injuries with her falls. She was walking in her house, tripped over something, and fell backwards on the stairs on Tuesday, 03/05/2024. She has not completed physical therapy for her balance recently.  She does not use an assistive device.    She confirms she had a TIA 13 years ago. She denies any new symptoms.     Her primary care provider is Nidia Osorio PA-C. She still sees cardiology. Her primary care provider manages her Abilify and duloxetine.     She had a renal calculus since her last visit. Her renal calculi were originally thought to be from Topamax, so she was taken off but developed the most recent one. The most recent renal calculus was her largest at 11 mm.    Prior Neurological history and workup:  Followed long term in clinic for anxiety, memory loss, and dizziness complaints, episodic migraines, fibromyalgia, chronic neck and balance difficulties.      Denies seeing psychiatry or a counselor and this has been recommended on multiple occasions.   " "  She feels that her memory has remained about the same since her last visit. She reports that since she retired in 2011 after her transient ischemic attack, she cannot remember how to do things she did before or anything from her childhood.     Her highest level of education is beyond a Master's degree in education.   She denies any trouble with driving.   She lives with her . She independently manages her medications and her finances.      She confirms following with cardiology.     She reports that she has not been able to tolerate the CPAP. She adds that she has tried 4 or 5 masks, but she could not get used to it.      Prior smoker and quit.     She reports that her diabetes mellitus type 2 is better.     She states that her fibromyalgia \"comes and goes\" and she has \"good days and bad days.\"      She confirms that she is on gabapentin &  hydrocodone. She is on duloxetine 60 mg and 30 mg, vitamin B12 injections, Abilify 10 mg, clonidine for sleep, Plavix and rosuvastatin.       Family history: Her maternal grandmother had Alzheimer's or dementia.     Past surgical history: She notes that she had right knee arthroplasty.    Neuropsychological testing at  in 08/2021 showed multifactorial issues with mostly identified psychiatric difficulties due to depression, anxiety, and untreated sleep apnea.       MRI of the brain with and without contrast on 04/09/2022, stable and essentially normal contrasted MRI of the brain, a few minimal chronic small vessel ischemic changes. No abnormal contrast enhancement.    TSH normal.    MRI C spine remote 2014 and 2016 showed multi-level DDD    The following portions of the patient's history were reviewed and updated as appropriate: allergies, current medications, past family history, past medical history, past social history, past surgical history, and problem list.    Past Medical History:   Diagnosis Date    Arthritis     Asthma     Ataxia     Bladder infection     " Bronchitis     Calculus of distal right ureter     Cardiac murmur 2019    Chest pain     [facility] 2003-near normal coronaries, Cardiolite -Exercised for 7 minutes and 22 seconds-no evidence of ischemia, LVEF 84%, ER presentation on 3/17/16 Dr. 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     Hyperlipidemia     Hypertension     Kidney stone     Low back pain     Memory loss     Migraine 1975    Muscle weakness (generalized)     Neck pain     Obesity     Osteoarthritis     Osteoporosis     Peripheral neuropathy     Sleep apnea     Stroke     MRI of brain  revealing Microvascular changes and evidence of 3mm, left basal fanglia lacunar infarct with initiation of Plavix    Syncope     neg Tilt Table 2011    Tendonitis     TIA (transient ischemic attack)     Ureteral stone     Vaginal infection        Past Surgical History:   Procedure Laterality Date    APPENDECTOMY      CHOLECYSTECTOMY      HYSTERECTOMY      KIDNEY STONE SURGERY      REPLACEMENT TOTAL KNEE Right 11/15/2022    ROTATOR CUFF REPAIR Left     TUBAL ABDOMINAL LIGATION         Social History     Socioeconomic History    Marital status:    Tobacco Use    Smoking status: Former     Current packs/day: 0.00     Average packs/day: 1 pack/day for 20.0 years (20.0 ttl pk-yrs)     Types: Cigarettes     Start date: 1973     Quit date: 1995     Years since quittin.2     Passive exposure: Current (friend: once monthly)    Smokeless tobacco: Never   Vaping Use    Vaping status: Never Used   Substance and Sexual Activity    Alcohol use: No    Drug use: No    Sexual activity: Yes     Partners: Male     Birth  control/protection: Hysterectomy, Surgical       Family History   Problem Relation Age of Onset    Colon cancer Mother     Diabetes Mother     Hypertension Mother     Cancer Mother     Heart disease Mother     Migraines Mother     Colon cancer Father     Hypertension Father     Prostate cancer Father     Arthritis Father     Cancer Father     Heart disease Father     Diabetes Maternal Grandmother     Hypertension Maternal Grandmother     Migraines Maternal Grandmother     Diabetes Maternal Grandfather     Prostate cancer Maternal Grandfather     Hypertension Paternal Grandmother     Lung cancer Paternal Grandfather     Prostate cancer Paternal Grandfather     Bleeding Disorder Brother           Current Outpatient Medications:     albuterol sulfate  (90 Base) MCG/ACT inhaler, ProAir  (90 Base) MCG/ACT Inhalation Aerosol Solution; Patient Sig: ProAir  (90 Base) MCG/ACT Inhalation Aerosol Solution ; 8; 0; 20-Feb-2014; Active, Disp: , Rfl:     ARIPiprazole (ABILIFY) 10 MG tablet, Take 1 tablet by mouth Daily., Disp: , Rfl: 2    CloNIDine (CATAPRES) 0.1 MG tablet, Take 1 tablet by mouth Daily., Disp: , Rfl: 2    clopidogrel (PLAVIX) 75 MG tablet, Take 1 tablet by mouth Daily., Disp: , Rfl:     cyanocobalamin 1000 MCG/ML injection, Every 30 (Thirty) Days., Disp: , Rfl:     Dapagliflozin Propanediol 10 MG tablet, Take 10 mg by mouth Daily., Disp: , Rfl:     desloratadine (CLARINEX) 5 MG tablet, Take 1 tablet by mouth Daily., Disp: , Rfl:     DULoxetine (CYMBALTA) 30 MG capsule, Take 1 capsule by mouth Daily., Disp: , Rfl: 1    DULoxetine (CYMBALTA) 60 MG capsule, Take 1 capsule by mouth Daily., Disp: , Rfl:     ezetimibe (ZETIA) 10 MG tablet, Take 1 tablet by mouth Daily., Disp: , Rfl:     gabapentin (NEURONTIN) 600 MG tablet, Take 1 tablet by mouth 3 (Three) Times a Day. 2 tabs three times a day, Disp: , Rfl:     HYDROcodone-acetaminophen (NORCO)  MG per tablet, Take 1 tablet by mouth As  "Needed., Disp: , Rfl: 0    lisinopril (PRINIVIL,ZESTRIL) 10 MG tablet, Take 1 tablet by mouth Daily., Disp: , Rfl:     metFORMIN (GLUCOPHAGE) 1000 MG tablet, Take 1 tablet by mouth 2 (Two) Times a Day With Meals., Disp: , Rfl:     metoprolol tartrate (LOPRESSOR) 25 MG tablet, Take 1 tablet by mouth Daily. 1/2 tablet daily, Disp: , Rfl:     montelukast (SINGULAIR) 10 MG tablet, Take 1 tablet by mouth Every Night., Disp: , Rfl:     pantoprazole (PROTONIX) 40 MG EC tablet, Take 1 tablet by mouth Daily., Disp: , Rfl:     rOPINIRole (REQUIP) 0.5 MG tablet, Take 1 tablet by mouth every night at bedtime., Disp: , Rfl:     rosuvastatin (CRESTOR) 10 MG tablet, Take 1 tablet by mouth Daily., Disp: , Rfl:     Victoza 18 MG/3ML solution pen-injector injection, Inject 1.2 mg under the skin into the appropriate area as directed Daily., Disp: , Rfl:      Review of Systems   Musculoskeletal:  Positive for gait problem and neck pain.   Neurological:  Positive for headaches.   Psychiatric/Behavioral:  Positive for decreased concentration, dysphoric mood and sleep disturbance. The patient is nervous/anxious.    All other systems reviewed and are negative.       Objective:  /90   Pulse 61   Ht 177.8 cm (70\")   Wt 95.3 kg (210 lb)   SpO2 90%   BMI 30.13 kg/m²     Neurologic Exam     Mental Status   Oriented to person, place, and time.   Speech: speech is normal   Level of consciousness: alert    Cranial Nerves   Cranial nerves II through XII intact.     Motor Exam   Muscle bulk: normal  Overall muscle tone: normal    Strength   Strength 5/5 throughout.     Gait, Coordination, and Reflexes     Gait  Gait: normal    Coordination   Romberg: negative  Finger to nose coordination: normal  Heel to shin coordination: normal    Tremor   Resting tremor: absent  Intention tremor: absent  Action tremor: absent    Reflexes   Right brachioradialis: 1+  Left brachioradialis: 1+  Right biceps: 1+  Left biceps: 1+  Right patellar: 1+  Left " patellar: 1+  Right achilles: 1+  Left achilles: 1+  Right : 2+  Left : 2+      Physical Exam  Constitutional:       Appearance: Normal appearance. She is obese.      Comments: BMI 30.1   Neurological:      Mental Status: She is alert and oriented to person, place, and time.      Cranial Nerves: Cranial nerves 2-12 are intact.      Motor: Motor strength is normal.     Coordination: Finger-Nose-Finger Test, Heel to Shin Test and Romberg Test normal.      Gait: Gait is intact.      Deep Tendon Reflexes:      Reflex Scores:       Bicep reflexes are 1+ on the right side and 1+ on the left side.       Brachioradialis reflexes are 1+ on the right side and 1+ on the left side.       Patellar reflexes are 1+ on the right side and 1+ on the left side.       Achilles reflexes are 1+ on the right side and 1+ on the left side.  Psychiatric:         Mood and Affect: Mood is anxious and depressed.         Speech: Speech normal.         Behavior: Behavior normal.         Thought Content: Thought content normal.         Cognition and Memory: She exhibits impaired recent memory (reports mild).         Judgment: Judgment normal.       MMSE previously 28 out of 30, 2 out of 3 for word recall.      Today, [place] Cognitive Assessment is a 26 out of 30, 2 out of 5 for word recall uncued, 4 out of 5 for word recall cued. Missed 1 point for executive function, otherwise unremarkable.     Assessment/Plan:       Diagnoses and all orders for this visit:    1. Migraine without aura and without status migrainosus, not intractable (Primary)  Comments:  Nurtec ODT 75mg at onset of migraine as needed    2. Cognitive complaints  Comments:  monitor for now, likely multifactorial    3. Personal history of transient ischemic attack (TIA), and cerebral infarction without residual deficits  Comments:  continue plavix and statin therapy for secondary stroke prevention    4. Obstructive sleep apnea syndrome  -     Ambulatory Referral to Sleep  Medicine    5. Restless legs  -     Ambulatory Referral to Sleep Medicine    6. Psychophysiological insomnia  -     Ambulatory Referral to Sleep Medicine    7. Recurrent falls while walking  -     Ambulatory Referral to Physical Therapy Evaluate and treat    8. Impairment of balance  -     Ambulatory Referral to Physical Therapy Evaluate and treat         She has several chronic health conditions. Memory testing is reassuring, likely still multifactorial. Referral to sleep specialist for management of sleep apnea, restless legs syndrome, and insomnia. I also recommended she discuss an oral appliance with her dentist.     She is still not seeing psychiatry. I have encouraged her still to schedule.     She has had some recurrent falls and impaired balance. I ordered physical therapy to work on her balance. She has known cervical disc changes. She has recently been evaluated by Dr. Hyatt, orthopedic specialist in [place] and is pending to have an MRI of the cervical spine. She continues to follow with pain management.     We will try to get Nurtec as needed approved for her to take at the onset of a migraine. This is safe with her heart. Our pharmacy team will be meeting with her today.     We did discuss that several of her medications can negatively affect memory. She has had no more TIA symptoms. We are going to monitor the memory with repeat MOCA in 1 year. Continue Plavix and statin therapy for secondary stroke prevention. Follow up with neurology in 1 year or sooner if needed. She verbalizes understanding and agrees with plan moving forward today.    Reviewed medications, potential side effects and signs and symptoms to report. Discussed risk versus benefits of treatment plan with patient and/or family-including medications, labs and radiology that may be ordered. Addressed questions and concerns during visit. Patient and/or family verbalized understanding and agree with plan.    During this visit the following  were done:  Labs Reviewed [x]    Labs Ordered []    Radiology Reports Reviewed [x]    Radiology Ordered []    PCP Records Reviewed []    Referring Provider Records Reviewed []    ER Records Reviewed []    Hospital Records Reviewed []    History Obtained From Family []    Radiology Images Reviewed []    Other Reviewed [x]    Records Requested []        Transcribed from ambient dictation for Meaghan Escobedo OTTO Marino by Estrella Webb.  03/08/24   12:33 EST    Patient or patient representative verbalized consent to the visit recording.  I have personally performed the services described in this document as transcribed by the above individual, and it is both accurate and complete.  Meaghan Escobedo OTTO Marino  3/8/2024  12:46 EST     Note to patient: The 21st Century Cures Act makes medical notes like these available to patients in the interest of transparency. However, be advised this is a medical document. It is intended as peer to peer communication. It is written in medical language and may contain abbreviations or verbiage that are unfamiliar. It may appear blunt or direct. Medical documents are intended to carry relevant information, facts as evident, and the clinical opinion of the provider.

## 2024-03-08 NOTE — LETTER
"March 8, 2024     ZACKARY Duff  [address] [place] [id number]    Patient: Katelyn Wilson   YOB: 1960   Date of Visit: 3/8/2024       Dear ZACKARY Duff    Katelyn Wilson was in my office today. Below is a copy of my note.    If you have questions, please do not hesitate to call me. I look forward to following Katelyn along with you.         Sincerely,        OTTO Fields        CC: MD Maite Fontanez APRN Paula W Hollingsworth, MD Angela D Lanter, APRN Brittney D Herald, APRN    Subjective:     Patient ID: Katelyn Wilson is a 63 y.o. female.    CC:   Chief Complaint   Patient presents with   • cognitive complaint   • Transient Ischemic Attack     2011       HPI:   History of Present Illness  Today 3/8/2024-  This is a pleasant 63-year-old female who I last saw in clinic on 01/17/2023 for known depression and anxiety, reports of memory loss, and dizziness present for many years. She also has a diagnosis of migraines, fibromyalgia, chronic neck and balance difficulties.      She did undergo formal neuropsychological testing in 08/2021 with high concern of psychiatric difficulties with depression, anxiety, and untreated sleep apnea. I did refer her to behavioral health last visit to establish care. She is here for follow-up and reevaluation of symptoms today.     Today, she reports she is doing well. She has not met with psychiatry yet. Her memory has been about the same.      She is not wearing a CPAP. She has not met with a sleep specialist regarding other options. She confirms she is seeing a dentist. She has not discussed an oral appliance with her dentist, but she has thought about this. She sees her dentist next week. She states she has trouble with sleep and is \"up and down\" every night. She rarely sleeps through the night.     She has been diagnosed with restless legs syndrome since her last visit. She was prescribed ropinirole " "yesterday, 03/07/2024, by her podiatrist.     She reports her headaches are \"pretty good.\" She had a severe headache on Wednesday, 03/06/2024, but that was the first one she experienced in \"a while.\" She does not take anything for the headaches because nothing is helpful. She lays down with ice. She previously took Imitrex injections a long time ago. She has never taken Ubrelvy or Nurtec. She experiences photophobia, phonophobia, and throbbing pain. She has experienced vomiting in the past. Her migraine pain is typically located on the left side of her face. She experiences a migraine every other month, and she always feels \"weird and foggy\" afterwards. Triptans are contraindicated with CV risk factors.    She has intermittent fibromyalgia pain. She notes she has \"good days and bad days.\" She confirms she is seeing a pain specialist. She was sent to an orthopedic surgeon for injections in her neck. She is trying to obtain an MRI of her cervical spine. She has pain shooting through her arms that her specialist believes is from her neck. She is doing exercises at home and was told she had to do this for 6 weeks. She previously had an MRI of the cervical spine on 02/17/2016 which showed mild disc changes. She also had an MRI of the cervical spine in 2014 which showed disc changes. She was told her recent cervical x-ray showed significant changes. Her orthopedic specialist is Dr. Hyatt in [place], [place].    She reports she falls often. She does not know why she falls but notes she trips. She sometimes has injuries with her falls. She was walking in her house, tripped over something, and fell backwards on the stairs on Tuesday, 03/05/2024. She has not completed physical therapy for her balance recently.  She does not use an assistive device.    She confirms she had a TIA 13 years ago. She denies any new symptoms.     Her primary care provider is Nidia Osorio PA-C. She still sees cardiology. Her primary care " "provider manages her Abilify and duloxetine.     She had a renal calculus since her last visit. Her renal calculi were originally thought to be from Topamax, so she was taken off but developed the most recent one. The most recent renal calculus was her largest at 11 mm.    Prior Neurological history and workup:  Followed long term in clinic for anxiety, memory loss, and dizziness complaints, episodic migraines, fibromyalgia, chronic neck and balance difficulties.      Denies seeing psychiatry or a counselor and this has been recommended on multiple occasions.     She feels that her memory has remained about the same since her last visit. She reports that since she retired in 2011 after her transient ischemic attack, she cannot remember how to do things she did before or anything from her childhood.     Her highest level of education is beyond a Master's degree in education.   She denies any trouble with driving.   She lives with her . She independently manages her medications and her finances.      She confirms following with cardiology.     She reports that she has not been able to tolerate the CPAP. She adds that she has tried 4 or 5 masks, but she could not get used to it.      Prior smoker and quit.     She reports that her diabetes mellitus type 2 is better.     She states that her fibromyalgia \"comes and goes\" and she has \"good days and bad days.\"      She confirms that she is on gabapentin &  hydrocodone. She is on duloxetine 60 mg and 30 mg, vitamin B12 injections, Abilify 10 mg, clonidine for sleep, Plavix and rosuvastatin.       Family history: Her maternal grandmother had Alzheimer's or dementia.     Past surgical history: She notes that she had right knee arthroplasty.    Neuropsychological testing at  in 08/2021 showed multifactorial issues with mostly identified psychiatric difficulties due to depression, anxiety, and untreated sleep apnea.       MRI of the brain with and without contrast on " 04/09/2022, stable and essentially normal contrasted MRI of the brain, a few minimal chronic small vessel ischemic changes. No abnormal contrast enhancement.    TSH normal.    MRI C spine remote 2014 and 2016 showed multi-level DDD    The following portions of the patient's history were reviewed and updated as appropriate: allergies, current medications, past family history, past medical history, past social history, past surgical history, and problem list.    Past Medical History:   Diagnosis Date   • Arthritis    • Asthma    • Ataxia    • Bladder infection    • Bronchitis    • Calculus of distal right ureter    • Cardiac murmur 06/26/2019   • Chest pain     [facility] feb 2003-near normal coronaries, Cardiolite 2007-Exercised for 7 minutes and 22 seconds-no evidence of ischemia, LVEF 84%, ER presentation on 3/17/16 Dr. 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    • Hyperlipidemia    • Hypertension    • Kidney stone    • Low back pain    • Memory loss 2011   • Migraine 1975   • Muscle weakness (generalized)    • Neck pain    • Obesity    • Osteoarthritis    • Osteoporosis    • Peripheral neuropathy    • Sleep apnea 2004   • Stroke     MRI of brain 2001 revealing Microvascular changes and evidence of 3mm, left basal fanglia lacunar infarct with initiation of Plavix   • Syncope     neg Tilt Table 11/09/2011   • Tendonitis    • TIA (transient ischemic attack) 2011   • Ureteral stone    • Vaginal infection        Past Surgical History:   Procedure Laterality Date   • APPENDECTOMY     • CHOLECYSTECTOMY     • HYSTERECTOMY     • KIDNEY STONE SURGERY     • REPLACEMENT TOTAL KNEE Right 11/15/2022   • ROTATOR  CUFF REPAIR Left    • TUBAL ABDOMINAL LIGATION         Social History     Socioeconomic History   • Marital status:    Tobacco Use   • Smoking status: Former     Current packs/day: 0.00     Average packs/day: 1 pack/day for 20.0 years (20.0 ttl pk-yrs)     Types: Cigarettes     Start date: 1973     Quit date: 1995     Years since quittin.2     Passive exposure: Current (friend: once monthly)   • Smokeless tobacco: Never   Vaping Use   • Vaping status: Never Used   Substance and Sexual Activity   • Alcohol use: No   • Drug use: No   • Sexual activity: Yes     Partners: Male     Birth control/protection: Hysterectomy, Surgical       Family History   Problem Relation Age of Onset   • Colon cancer Mother    • Diabetes Mother    • Hypertension Mother    • Cancer Mother    • Heart disease Mother    • Migraines Mother    • Colon cancer Father    • Hypertension Father    • Prostate cancer Father    • Arthritis Father    • Cancer Father    • Heart disease Father    • Diabetes Maternal Grandmother    • Hypertension Maternal Grandmother    • Migraines Maternal Grandmother    • Diabetes Maternal Grandfather    • Prostate cancer Maternal Grandfather    • Hypertension Paternal Grandmother    • Lung cancer Paternal Grandfather    • Prostate cancer Paternal Grandfather    • Bleeding Disorder Brother           Current Outpatient Medications:   •  albuterol sulfate  (90 Base) MCG/ACT inhaler, ProAir  (90 Base) MCG/ACT Inhalation Aerosol Solution; Patient Sig: ProAir  (90 Base) MCG/ACT Inhalation Aerosol Solution ; 8; 0; ; Active, Disp: , Rfl:   •  ARIPiprazole (ABILIFY) 10 MG tablet, Take 1 tablet by mouth Daily., Disp: , Rfl: 2  •  CloNIDine (CATAPRES) 0.1 MG tablet, Take 1 tablet by mouth Daily., Disp: , Rfl: 2  •  clopidogrel (PLAVIX) 75 MG tablet, Take 1 tablet by mouth Daily., Disp: , Rfl:   •  cyanocobalamin 1000 MCG/ML injection, Every 30 (Thirty) Days., Disp: , Rfl:   •   "Dapagliflozin Propanediol 10 MG tablet, Take 10 mg by mouth Daily., Disp: , Rfl:   •  desloratadine (CLARINEX) 5 MG tablet, Take 1 tablet by mouth Daily., Disp: , Rfl:   •  DULoxetine (CYMBALTA) 30 MG capsule, Take 1 capsule by mouth Daily., Disp: , Rfl: 1  •  DULoxetine (CYMBALTA) 60 MG capsule, Take 1 capsule by mouth Daily., Disp: , Rfl:   •  ezetimibe (ZETIA) 10 MG tablet, Take 1 tablet by mouth Daily., Disp: , Rfl:   •  gabapentin (NEURONTIN) 600 MG tablet, Take 1 tablet by mouth 3 (Three) Times a Day. 2 tabs three times a day, Disp: , Rfl:   •  HYDROcodone-acetaminophen (NORCO)  MG per tablet, Take 1 tablet by mouth As Needed., Disp: , Rfl: 0  •  lisinopril (PRINIVIL,ZESTRIL) 10 MG tablet, Take 1 tablet by mouth Daily., Disp: , Rfl:   •  metFORMIN (GLUCOPHAGE) 1000 MG tablet, Take 1 tablet by mouth 2 (Two) Times a Day With Meals., Disp: , Rfl:   •  metoprolol tartrate (LOPRESSOR) 25 MG tablet, Take 1 tablet by mouth Daily. 1/2 tablet daily, Disp: , Rfl:   •  montelukast (SINGULAIR) 10 MG tablet, Take 1 tablet by mouth Every Night., Disp: , Rfl:   •  pantoprazole (PROTONIX) 40 MG EC tablet, Take 1 tablet by mouth Daily., Disp: , Rfl:   •  rOPINIRole (REQUIP) 0.5 MG tablet, Take 1 tablet by mouth every night at bedtime., Disp: , Rfl:   •  rosuvastatin (CRESTOR) 10 MG tablet, Take 1 tablet by mouth Daily., Disp: , Rfl:   •  Victoza 18 MG/3ML solution pen-injector injection, Inject 1.2 mg under the skin into the appropriate area as directed Daily., Disp: , Rfl:      Review of Systems   Musculoskeletal:  Positive for gait problem and neck pain.   Neurological:  Positive for headaches.   Psychiatric/Behavioral:  Positive for decreased concentration, dysphoric mood and sleep disturbance. The patient is nervous/anxious.    All other systems reviewed and are negative.       Objective:  /90   Pulse 61   Ht 177.8 cm (70\")   Wt 95.3 kg (210 lb)   SpO2 90%   BMI 30.13 kg/m²     Neurologic Exam     Mental " Status   Oriented to person, place, and time.   Speech: speech is normal   Level of consciousness: alert    Cranial Nerves   Cranial nerves II through XII intact.     Motor Exam   Muscle bulk: normal  Overall muscle tone: normal    Strength   Strength 5/5 throughout.     Gait, Coordination, and Reflexes     Gait  Gait: normal    Coordination   Romberg: negative  Finger to nose coordination: normal  Heel to shin coordination: normal    Tremor   Resting tremor: absent  Intention tremor: absent  Action tremor: absent    Reflexes   Right brachioradialis: 1+  Left brachioradialis: 1+  Right biceps: 1+  Left biceps: 1+  Right patellar: 1+  Left patellar: 1+  Right achilles: 1+  Left achilles: 1+  Right : 2+  Left : 2+      Physical Exam  Constitutional:       Appearance: Normal appearance. She is obese.      Comments: BMI 30.1   Neurological:      Mental Status: She is alert and oriented to person, place, and time.      Cranial Nerves: Cranial nerves 2-12 are intact.      Motor: Motor strength is normal.     Coordination: Finger-Nose-Finger Test, Heel to Shin Test and Romberg Test normal.      Gait: Gait is intact.      Deep Tendon Reflexes:      Reflex Scores:       Bicep reflexes are 1+ on the right side and 1+ on the left side.       Brachioradialis reflexes are 1+ on the right side and 1+ on the left side.       Patellar reflexes are 1+ on the right side and 1+ on the left side.       Achilles reflexes are 1+ on the right side and 1+ on the left side.  Psychiatric:         Mood and Affect: Mood is anxious and depressed.         Speech: Speech normal.         Behavior: Behavior normal.         Thought Content: Thought content normal.         Cognition and Memory: She exhibits impaired recent memory (reports mild).         Judgment: Judgment normal.       MMSE previously 28 out of 30, 2 out of 3 for word recall.      Today, Ruffin Cognitive Assessment is a 26 out of 30, 2 out of 5 for word recall uncued, 4 out  of 5 for word recall cued. Missed 1 point for executive function, otherwise unremarkable.     Assessment/Plan:       Diagnoses and all orders for this visit:    1. Migraine without aura and without status migrainosus, not intractable (Primary)  Comments:  Nurtec ODT 75mg at onset of migraine as needed    2. Cognitive complaints  Comments:  monitor for now, likely multifactorial    3. Personal history of transient ischemic attack (TIA), and cerebral infarction without residual deficits  Comments:  continue plavix and statin therapy for secondary stroke prevention    4. Obstructive sleep apnea syndrome  -     Ambulatory Referral to Sleep Medicine    5. Restless legs  -     Ambulatory Referral to Sleep Medicine    6. Psychophysiological insomnia  -     Ambulatory Referral to Sleep Medicine    7. Recurrent falls while walking  -     Ambulatory Referral to Physical Therapy Evaluate and treat    8. Impairment of balance  -     Ambulatory Referral to Physical Therapy Evaluate and treat         She has several chronic health conditions. Memory testing is reassuring, likely still multifactorial. Referral to sleep specialist for management of sleep apnea, restless legs syndrome, and insomnia. I also recommended she discuss an oral appliance with her dentist.     She is still not seeing psychiatry. I have encouraged her still to schedule.     She has had some recurrent falls and impaired balance. I ordered physical therapy to work on her balance. She has known cervical disc changes. She has recently been evaluated by Dr. Hyatt, orthopedic specialist in [place] and is pending to have an MRI of the cervical spine. She continues to follow with pain management.     We will try to get Nurtec as needed approved for her to take at the onset of a migraine. This is safe with her heart. Our pharmacy team will be meeting with her today.     We did discuss that several of her medications can negatively affect memory. She has had no more  TIA symptoms. We are going to monitor the memory with repeat MOCA in 1 year. Continue Plavix and statin therapy for secondary stroke prevention. Follow up with neurology in 1 year or sooner if needed. She verbalizes understanding and agrees with plan moving forward today.    Reviewed medications, potential side effects and signs and symptoms to report. Discussed risk versus benefits of treatment plan with patient and/or family-including medications, labs and radiology that may be ordered. Addressed questions and concerns during visit. Patient and/or family verbalized understanding and agree with plan.    During this visit the following were done:  Labs Reviewed [x]    Labs Ordered []    Radiology Reports Reviewed [x]    Radiology Ordered []    PCP Records Reviewed []    Referring Provider Records Reviewed []    ER Records Reviewed []    Hospital Records Reviewed []    History Obtained From Family []    Radiology Images Reviewed []    Other Reviewed [x]    Records Requested []        Transcribed from ambient dictation for OTTO Fields by Estrella Webb.  03/08/24   12:33 EST    Patient or patient representative verbalized consent to the visit recording.  I have personally performed the services described in this document as transcribed by the above individual, and it is both accurate and complete.  OTTO Fields  3/8/2024  12:46 EST     Note to patient: The 21st Century Cures Act makes medical notes like these available to patients in the interest of transparency. However, be advised this is a medical document. It is intended as peer to peer communication. It is written in medical language and may contain abbreviations or verbiage that are unfamiliar. It may appear blunt or direct. Medical documents are intended to carry relevant information, facts as evident, and the clinical opinion of the provider.

## 2024-03-11 ENCOUNTER — SPECIALTY PHARMACY (OUTPATIENT)
Dept: LAB | Facility: HOSPITAL | Age: 64
End: 2024-03-11
Payer: COMMERCIAL

## 2024-03-11 NOTE — PROGRESS NOTES
Specialty Pharmacy Patient Management Program  Neurology Initial Assessment     Katelyn Wilson is a 63 y.o. female with Chronic Migraines  seen by a Neurology provider and enrolled in the Neurology Patient Management program offered by [facility].  An initial outreach was conducted, including assessment of therapy appropriateness and specialty medication education for Ubrelvy. The patient was introduced to services offered by [facility], including: regular assessments, refill coordination, curbside pick-up or mail order delivery options, prior authorization maintenance, and financial assistance programs as applicable. The patient was also provided with contact information for the pharmacy team.     Insurance Coverage & Financial Support  [employer] + Ubrelvy copay card    Relevant Past Medical History and Comorbidities  Relevant medical history and concomitant health conditions were discussed with the patient. The patient's chart has been reviewed for relevant past medical history and comorbid health conditions and updated as necessary.   Past Medical History:   Diagnosis Date    Arthritis     Asthma     Ataxia     Bladder infection     Bronchitis     Calculus of distal right ureter     Cardiac murmur 06/26/2019    Chest pain     [facility] feb 2003-near normal coronaries, Cardiolite 2007-Exercised for 7 minutes and 22 seconds-no evidence of ischemia, LVEF 84%, ER presentation on 3/17/16 Dr. 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     Hyperlipidemia     Hypertension     Kidney stone     Low back pain     Memory loss 2011    Migraine 1975    Muscle  weakness (generalized)     Neck pain     Obesity     Osteoarthritis     Osteoporosis     Peripheral neuropathy     Sleep apnea 2004    Stroke     MRI of brain  revealing Microvascular changes and evidence of 3mm, left basal fanglia lacunar infarct with initiation of Plavix    Syncope     neg Tilt Table 2011    Tendonitis     TIA (transient ischemic attack)     Ureteral stone     Vaginal infection      Social History     Socioeconomic History    Marital status:    Tobacco Use    Smoking status: Former     Current packs/day: 0.00     Average packs/day: 1 pack/day for 20.0 years (20.0 ttl pk-yrs)     Types: Cigarettes     Start date: 1973     Quit date: 1995     Years since quittin.2     Passive exposure: Current (friend: once monthly)    Smokeless tobacco: Never   Vaping Use    Vaping status: Never Used   Substance and Sexual Activity    Alcohol use: No    Drug use: No    Sexual activity: Yes     Partners: Male     Birth control/protection: Hysterectomy, Surgical     Problem list reviewed by Phil Esposito, William on 3/11/2024 at  2:12 PM    Allergies  Known allergies and reactions were discussed with the patient. The patient's chart has been reviewed for  allergy information and updated as necessary.   Allergies   Allergen Reactions    Propoxyphene Anaphylaxis    Atorvastatin Myalgia     Causes muscle problems    Estrogens Other (See Comments)     Elevates triglycerides    Diovan [Valsartan] Rash     Allergies reviewed by Phil Esposito, William on 3/11/2024 at  2:12 PM    Relevant Laboratory Values      Lab Assessment  N/A.    Current Medication List  This medication list has been reviewed with the patient and evaluated for any interactions or necessary modifications/recommendations, and updated to include all prescription medications, OTC medications, and supplements the patient is currently taking.  This list reflects what is contained in the patient's profile, which has also  been marked as reviewed to communicate to other providers it is the most up to date version of the patient's current medication therapy.     Current Outpatient Medications:     albuterol sulfate  (90 Base) MCG/ACT inhaler, ProAir  (90 Base) MCG/ACT Inhalation Aerosol Solution; Patient Sig: ProAir  (90 Base) MCG/ACT Inhalation Aerosol Solution ; 8; 0; 20-Feb-2014; Active, Disp: , Rfl:     ARIPiprazole (ABILIFY) 10 MG tablet, Take 1 tablet by mouth Daily., Disp: , Rfl: 2    CloNIDine (CATAPRES) 0.1 MG tablet, Take 1 tablet by mouth Daily., Disp: , Rfl: 2    clopidogrel (PLAVIX) 75 MG tablet, Take 1 tablet by mouth Daily., Disp: , Rfl:     cyanocobalamin 1000 MCG/ML injection, Every 30 (Thirty) Days., Disp: , Rfl:     Dapagliflozin Propanediol 10 MG tablet, Take 10 mg by mouth Daily., Disp: , Rfl:     desloratadine (CLARINEX) 5 MG tablet, Take 1 tablet by mouth Daily., Disp: , Rfl:     DULoxetine (CYMBALTA) 30 MG capsule, Take 1 capsule by mouth Daily., Disp: , Rfl: 1    DULoxetine (CYMBALTA) 60 MG capsule, Take 1 capsule by mouth Daily., Disp: , Rfl:     ezetimibe (ZETIA) 10 MG tablet, Take 1 tablet by mouth Daily., Disp: , Rfl:     gabapentin (NEURONTIN) 600 MG tablet, Take 1 tablet by mouth 3 (Three) Times a Day. 2 tabs three times a day, Disp: , Rfl:     HYDROcodone-acetaminophen (NORCO)  MG per tablet, Take 1 tablet by mouth As Needed., Disp: , Rfl: 0    lisinopril (PRINIVIL,ZESTRIL) 10 MG tablet, Take 1 tablet by mouth Daily., Disp: , Rfl:     metFORMIN (GLUCOPHAGE) 1000 MG tablet, Take 1 tablet by mouth 2 (Two) Times a Day With Meals., Disp: , Rfl:     metoprolol tartrate (LOPRESSOR) 25 MG tablet, Take 1 tablet by mouth Daily. 1/2 tablet daily, Disp: , Rfl:     montelukast (SINGULAIR) 10 MG tablet, Take 1 tablet by mouth Every Night., Disp: , Rfl:     pantoprazole (PROTONIX) 40 MG EC tablet, Take 1 tablet by mouth Daily., Disp: , Rfl:     rOPINIRole (REQUIP) 0.5 MG tablet, Take 1  tablet by mouth every night at bedtime., Disp: , Rfl:     rosuvastatin (CRESTOR) 10 MG tablet, Take 1 tablet by mouth Daily., Disp: , Rfl:     ubrogepant (Ubrelvy) 100 MG tablet, Take 1 tablet by mouth 1 (One) Time As Needed (Migraine). Take at onset of headache - if symptoms persist or return, may repeat dose in 2 hours. Maximum: 200 mg per 24 hours, Disp: 16 tablet, Rfl: 11    Victoza 18 MG/3ML solution pen-injector injection, Inject 1.2 mg under the skin into the appropriate area as directed Daily., Disp: , Rfl:     Medicines reviewed by Phil Esposito, PharmD on 3/11/2024 at  2:12 PM    Drug Interactions  None     Initial Education Provided for Specialty Medication  The patient has been provided with the following education and any applicable administration techniques (i.e. self-injection) have been demonstrated for the therapies indicated. All questions and concerns have been addressed prior to the patient receiving the medication, and the patient has verbalized understanding of the education and any materials provided.  Additional patient education shall be provided and documented upon request by the patient, provider or payer.      Ubrelvy (Ubrogepant)  Medication Expectations   Why am I taking this medication? You are taking this medication to treat acute migraines.   What should I expect while on this medication? You should expect to see a decrease in the frequency and severity of your migraines.   How does the medication work? Ubrelvy is a monoclonal antibody that binds to calcitonin gene-related peptide (CGRP) and blocks its binding to the receptor decreasing the severity of migraines.   How long will I be on this medication for? The amount of time you will be on this medication will be determined by your doctor and your response to the medication.    How do I take this medication? Take as directed on your prescription label.  Reviewed plan for Ubrelvy 100mg (1 tablet) PO daily prn; may repeat x 1 in 2  hours, if needed. Max dosage = 200mg/24 hours.    What are some possible side effects? Potential side effects including, but not limited to nausea and somnolence. Pt verbalized understanding.   What happens if I miss a dose? This is taken as needed for migraines.     Medication Safety   What are things I should warn my doctor immediately about? Allergic reaction: Itching or hives, swelling in your face or hands, swelling or tingling in your mouth or throat, chest tightness, trouble breathing     What are things that I should be cautious of? Tell your doctor if you are pregnant or breastfeeding, or if you have kidney disease or liver disease.   What are some medications that can interact with this one? Concomitant use of strong CY inhibitors, check with your pharmacist or provider before starting any new medications, avoid grapefruit juice.     Medication Storage/Handling   How should I handle this medication? Keep this medication out of reach of pets/children.   How does this medication need to be stored? Store at a controlled room temperature between 20 and 25 degrees C (68 and 77 degrees F), with excursions permitted between 15 and 30 degrees C (59 and 86 degrees F) away from direct sunlight and moisture.   How should I dispose of this medication? There should not be a need to dispose of this medication unless your provider decides to change the dose or therapy. If that is the case, take to your local police station for proper disposal. Some pharmacies also have take-back bins for medication drop-off.      Resources/Support   How can I remind myself to take this medication? This is taken as needed for migraines.   Is financial support available?  Yes, [employer] can provide co-pay cards if you have commercial insurance or patient assistance if you have Medicare or no insurance.    Which vaccines are recommended for me? Talk to your doctor about these vaccines: Flu, Coronavirus (COVID-19),  Pneumococcal (pneumonia), Tdap, Hepatitis B, Zoster (shingles)       Enter Specific Medication SmartPhrase Here    Adherence and Self-Administration  Adherence related to the patient's specialty therapy was discussed with the patient. The Adherence segment of this outreach has been reviewed and updated.   Is there a concern with patient's ability to self administer the medication correctly and without issue?: No  Were any potential barriers to adherence identified during the initial assessment or patient education?: No  Are there any concerns regarding the patient's understanding of the importance of medication adherence?: No  Methods for Supporting Patient Adherence and/or Self-Administration: Not required.    Goals of Therapy  Goals related to the patient's specialty therapy were discussed with the patient. The Patient Goals segment of this outreach has been reviewed and updated.   Goals Addressed Today        Specialty Pharmacy General Goal      Decrease frequency, severity and duration of migraine attacks.                Reassessment Plan & Follow-Up  Medication Therapy Changes: Ubrelvy 100mg- take 1 tab po prn onset of headache (may repeat dose x1 in 2 hours if needed, max 2 tabs/day)  Related Plans, Therapy Recommendations, or Therapy Problems to Be Addressed: Originally, provider wanted pt to start Nurtec but this was not preferred on the patient's insurance plan. Provider then okay'ed Ubrelvy instead. I advised the patient to take as needed at the onset of a headache and to repeat in 2 hours if needed. Ubrelvy may cause some upset stomach, nausea, and potentially some drowsiness. Lastly, we discussed the mail out process using [employer] overnight and we'll ship from [facility] on 3/12/24, deliver 3/13/24. [facility] on 3/11/24 [facility] ERX down. Confirmed expected copay of: $0 based on most recent unaffected dispense history 3/11/24. Pt acknowledged and she'll call with any  questions or concerns.   Pharmacist to perform regular reassessments no more than (6) months from the previous assessment.  Care Coordinator to set up future refill outreaches, coordinate prescription delivery, and escalate clinical questions to pharmacist.   Welcome information and patient satisfaction survey to be sent by specialty pharmacy team with patient's initial fill.    Attestation  Therapeutic appropriateness: Appropriate   I attest the patient was actively involved in and has agreed to the above plan of care. If the prescribed therapy is at any point deemed not appropriate based on the current or future assessments, a consultation will be initiated with the patient's specialty care provider to determine the best course of action. The revised plan of therapy will be documented along with any additional patient education provided. Discussed aforementioned material with patient in person.    Phil Esposito, PharmD  Clinic Specialty Pharmacist, Neurology  3/11/2024  14:14 EDT

## 2024-08-13 ENCOUNTER — SPECIALTY PHARMACY (OUTPATIENT)
Dept: NEUROLOGY | Facility: CLINIC | Age: 64
End: 2024-08-13
Payer: COMMERCIAL

## 2024-08-13 NOTE — PROGRESS NOTES
Specialty Pharmacy Refill Coordination Note     Katelyn is a 64 y.o. female contacted today regarding refills of  UBRELVY specialty medication(s).    Reviewed and verified with patient: YES  Specialty medication(s) and dose(s) confirmed: yes    Refill Questions      Flowsheet Row Most Recent Value   Changes to allergies? No   Changes to medications? No   New conditions or infections since last clinic visit No   Unplanned office visit, urgent care, ED, or hospital admission in the last 4 weeks  No   How does patient/caregiver feel medication is working? Very good   Financial problems or insurance changes  No   Since the previous refill, were any specialty medication doses or scheduled injections missed or delayed?  No   Does this patient require a clinical escalation to a pharmacist? No            Delivery Questions      Flowsheet Row Most Recent Value   Delivery method [employer]   Delivery address verified with patient/caregiver? Yes   Delivery address Home   Number of medications in delivery 1   Medication(s) being filled and delivered Ubrogepant   Doses left of specialty medications 4   Copay verified? Yes   Copay amount 0   Copay form of payment No copayment ($0)   Ship Date 8/14   Delivery Date 8/15   Signature Required No                   Follow-up: 30 day(s)     Federica Ro, Pharmacy Technician  Specialty Pharmacy Technician

## 2024-08-15 ENCOUNTER — SPECIALTY PHARMACY (OUTPATIENT)
Dept: GENERAL RADIOLOGY | Facility: HOSPITAL | Age: 64
End: 2024-08-15
Payer: COMMERCIAL

## 2024-08-15 NOTE — PROGRESS NOTES
Specialty Pharmacy Patient Management Program  Neurology Reassessment     Katelyn Wilson is a 64 y.o. female with Chronic Migraine seen by a Neurology provider and enrolled in the Neurology Patient Management program offered by [facility].  A follow-up outreach was conducted, including assessment of continued therapy appropriateness, medication adherence, and side effect incidence and management for Ubrelvy.     Changes to Insurance Coverage or Financial Support  No changes, pt still has [employer] + Ubrelvy copay card     Relevant Past Medical History and Comorbidities  Relevant medical history and concomitant health conditions were discussed with the patient. The patient's chart has been reviewed for relevant past medical history and comorbid health conditions and updated as necessary.   Past Medical History:   Diagnosis Date    Arthritis     Asthma     Ataxia     Bladder infection     Bronchitis     Calculus of distal right ureter     Cardiac murmur 06/26/2019    Chest pain     [facility] feb 2003-near normal coronaries, Cardiolite 2007-Exercised for 7 minutes and 22 seconds-no evidence of ischemia, LVEF 84%, ER presentation on 3/17/16 Dr. 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     Hyperlipidemia     Hypertension     Kidney stone     Low back pain     Memory loss 2011    Migraine 1975    Muscle weakness (generalized)     Neck pain     Obesity     Osteoarthritis     Osteoporosis     Peripheral neuropathy     Sleep apnea 2004    Stroke     MRI of brain 2001 revealing Microvascular changes and evidence of 3mm, left basal fanglia lacunar infarct with initiation of Plavix     Syncope     neg Tilt Table 2011    Tendonitis     TIA (transient ischemic attack)     Ureteral stone     Vaginal infection      Social History     Socioeconomic History    Marital status:    Tobacco Use    Smoking status: Former     Current packs/day: 0.00     Average packs/day: 1 pack/day for 20.0 years (20.0 ttl pk-yrs)     Types: Cigarettes     Start date: 1973     Quit date: 1995     Years since quittin.6     Passive exposure: Current (friend: once monthly)    Smokeless tobacco: Never   Vaping Use    Vaping status: Never Used   Substance and Sexual Activity    Alcohol use: No    Drug use: No    Sexual activity: Yes     Partners: Male     Birth control/protection: Hysterectomy, Surgical     Problem list reviewed by Phil Esposito, William on 8/15/2024 at  5:56 PM    Hospitalizations and Urgent Care Since Last Assessment  ED Visits, Admissions, or Hospitalizations: None   Urgent Office Visits: None     Allergies  Known allergies and reactions were discussed with the patient. The patient's chart has been reviewed for allergy information and updated as necessary.   Allergies   Allergen Reactions    Propoxyphene Anaphylaxis    Atorvastatin Myalgia     Causes muscle problems    Estrogens Other (See Comments)     Elevates triglycerides    Diovan [Valsartan] Rash     Allergies reviewed by Phil Esposito, William on 8/15/2024 at  5:56 PM    Relevant Laboratory Values      Lab Assessment  N/A.   Current Medication List  This medication list has been reviewed with the patient and evaluated for any interactions or necessary modifications/recommendations, and updated to include all prescription medications, OTC medications, and supplements the patient is currently taking.  This list reflects what is contained in the patient's profile, which has also been marked as reviewed to communicate to other providers it is the most up to date version of the patient's current medication therapy.      Current Outpatient Medications:     albuterol sulfate  (90 Base) MCG/ACT inhaler, ProAir  (90 Base) MCG/ACT Inhalation Aerosol Solution; Patient Sig: ProAir  (90 Base) MCG/ACT Inhalation Aerosol Solution ; 8; 0; 20-Feb-2014; Active, Disp: , Rfl:     ARIPiprazole (ABILIFY) 10 MG tablet, Take 1 tablet by mouth Daily., Disp: , Rfl: 2    CloNIDine (CATAPRES) 0.1 MG tablet, Take 1 tablet by mouth Daily., Disp: , Rfl: 2    clopidogrel (PLAVIX) 75 MG tablet, Take 1 tablet by mouth Daily., Disp: , Rfl:     cyanocobalamin 1000 MCG/ML injection, Every 30 (Thirty) Days., Disp: , Rfl:     Dapagliflozin Propanediol 10 MG tablet, Take 10 mg by mouth Daily., Disp: , Rfl:     desloratadine (CLARINEX) 5 MG tablet, Take 1 tablet by mouth Daily., Disp: , Rfl:     DULoxetine (CYMBALTA) 30 MG capsule, Take 1 capsule by mouth Daily., Disp: , Rfl: 1    DULoxetine (CYMBALTA) 60 MG capsule, Take 1 capsule by mouth Daily., Disp: , Rfl:     ezetimibe (ZETIA) 10 MG tablet, Take 1 tablet by mouth Daily., Disp: , Rfl:     gabapentin (NEURONTIN) 600 MG tablet, Take 1 tablet by mouth 3 (Three) Times a Day. 2 tabs three times a day, Disp: , Rfl:     HYDROcodone-acetaminophen (NORCO)  MG per tablet, Take 1 tablet by mouth As Needed., Disp: , Rfl: 0    lisinopril (PRINIVIL,ZESTRIL) 10 MG tablet, Take 1 tablet by mouth Daily., Disp: , Rfl:     metFORMIN (GLUCOPHAGE) 1000 MG tablet, Take 1 tablet by mouth 2 (Two) Times a Day With Meals., Disp: , Rfl:     metoprolol tartrate (LOPRESSOR) 25 MG tablet, Take 1 tablet by mouth Daily. 1/2 tablet daily, Disp: , Rfl:     montelukast (SINGULAIR) 10 MG tablet, Take 1 tablet by mouth Every Night., Disp: , Rfl:     pantoprazole (PROTONIX) 40 MG EC tablet, Take 1 tablet by mouth Daily., Disp: , Rfl:     rOPINIRole (REQUIP) 0.5 MG tablet, Take 1 tablet by mouth every night at bedtime., Disp: , Rfl:     rosuvastatin (CRESTOR) 10 MG tablet, Take 1 tablet by mouth Daily., Disp: , Rfl:      ubrogepant (Ubrelvy) 100 MG tablet, Take 1 tablet by mouth 1 (One) Time As Needed (Migraine). Take at onset of headache - if symptoms persist or return, may repeat dose in 2 hours. Maximum: 200 mg per 24 hours, Disp: 16 tablet, Rfl: 11    Victoza 18 MG/3ML solution pen-injector injection, Inject 1.2 mg under the skin into the appropriate area as directed Daily., Disp: , Rfl:     Medicines reviewed by Phil Esposito, PharmD on 8/15/2024 at  5:56 PM    Drug Interactions  None     Adverse Drug Reactions  Medication tolerability: Tolerating with no to minimal ADRs          Medication plan: Continue therapy with normal follow-up  Plan for ADR Management: Not required.       Adherence, Self-Administration, and Current Therapy Problems  Adherence related patient's specialty therapy was discussed with the patient. The Adherence segment of this outreach has been reviewed and updated.   Adherence Questions  Linked Medication(s) Assessed: Ubrogepant  On average, how many doses/injections does the patient miss per month?: 0  What are the identified reasons for non-adherence or missed doses? : no problems identified  What is the estimated medication adherence level?: %  Based on the patient/caregiver response and refill history, does this patient require an MTP to track adherence improvements?: no    Additional Barriers to Patient Self-Administration: None  Methods for Supporting Patient Self-Administration: Not required.    Recently Close Medication Therapy Problems  No medication therapy recommendations to display  Open Medication Therapy Problems  No medication therapy recommendations to display     Goals of Therapy  Goals related to the patient's specialty therapy was discussed with the patient. The Patient Goals segment of this outreach has been reviewed and updated.    Goals Addressed Today        Specialty Pharmacy General Goal      Decrease frequency, severity and duration of migraine attacks.                 Quality of Life Assessment   Quality of Life related to the patient's enrollment in the patient management program and services provided was discussed with the patient. The QOL segment of this outreach has been reviewed and updated.   Quality of Life Improvement Scale: 8-Moderately better    Reassessment Plan & Follow-Up  Medication Therapy Changes: Continue Ubrelvy 100mg- take 1 tab po prn onset of migraine (may repeat dose x1 in 2 hours if needed, max 2 tabs/day)  Related Plans, Therapy Recommendations, or Issues to Be Addressed: Pt is doing very well on Ubrelvy currently. She takes this sparingly and is very happy with the medication. She is experiencing about 1 or so migraines per month currently and Ubrelvy does help to treat. She did not have any questions or concerns at this time. We will continue to fill through [facility] and ship via [employer] overnight in the future. Pt will call with any questions or concerns.   Pharmacist to perform regular reassessments no more than (6) months from the previous assessment.  Care Coordinator to set up future refill outreaches, coordinate prescription delivery, and escalate clinical questions to pharmacist.     Attestation  Therapeutic appropriateness: Appropriate  I attest the patient was actively involved in and has agreed to the above plan of care. If the prescribed therapy is at any point deemed not appropriate based on the current or future assessments, a consultation will be initiated with the patient's specialty care provider to determine the best course of action. The revised plan of therapy will be documented along with any additional patient education provided. Discussed aforementioned material with patient by phone.    Phil Esposito, William  Clinic Specialty Pharmacist, Neurology  8/15/2024  17:58 EDT

## 2024-11-21 ENCOUNTER — OFFICE VISIT (OUTPATIENT)
Dept: CARDIOLOGY | Facility: CLINIC | Age: 64
End: 2024-11-21
Payer: COMMERCIAL

## 2024-11-21 VITALS
HEIGHT: 70 IN | WEIGHT: 216.4 LBS | HEART RATE: 95 BPM | OXYGEN SATURATION: 98 % | DIASTOLIC BLOOD PRESSURE: 62 MMHG | SYSTOLIC BLOOD PRESSURE: 110 MMHG | BODY MASS INDEX: 30.98 KG/M2

## 2024-11-21 DIAGNOSIS — I38 VHD (VALVULAR HEART DISEASE): ICD-10-CM

## 2024-11-21 DIAGNOSIS — E78.5 HYPERLIPIDEMIA LDL GOAL <100: ICD-10-CM

## 2024-11-21 DIAGNOSIS — I77.810 AORTIC ECTASIA, THORACIC: Primary | ICD-10-CM

## 2024-11-21 DIAGNOSIS — I10 ESSENTIAL HYPERTENSION: ICD-10-CM

## 2024-11-21 PROCEDURE — 99214 OFFICE O/P EST MOD 30 MIN: CPT | Performed by: PHYSICIAN ASSISTANT

## 2024-11-21 NOTE — PROGRESS NOTES
[facility] Cardiology    Date: 2024    Patient ID: Katelyn Wilson is a 64 y.o. female   : 1960   Contact: [phone]         Chief Complaint:    Chief Complaint   Patient presents with    Aortic ectasia, thoracic       Problem List:  Chest pain with mixed features of angina pectoris  [facility], [place], 2003:  Revealing near normal coronaries  Cardiolite GXT, 07:  Exercise for 7 minutes and 22 seconds reaching target heart rate with no chest pain or severe dyspnea and no evidence of ischemia.  Peak blood pressure 160/90 and EF of 84%  Normal echocardiogram 2010  Normal dobutamine stress echo, 2010  [facility], also, 3/17/16: Near normal coronary arteries, no evidence of hemodynamically significant coronary artery disease, left ejection fraction 65%, incidental aneurysm noted with CT follow-up recommended  Echo 19: EF 60%, sclerotic aortic valve without stenosis, trace AI, trace MR, trace to mild TR.   Echo 10/30/2023: EF 55-60%, borderline LVH, normal aortic root  Thoracic aortic aneurysm  CT angiogram of chest 3/17/16: Mild ectasia of the ascending aorta measuring 3.4 x 3.8 cm, no acute chest pathology  CT of chest, 18: Ascending aorta is upper limits of normal diameter, 4 cm   Syncope  Normal EEG, 11  Normal Holter, 11  Negative tilt table, 11  Carotid stenosis   Carotid duplex, 2021: Bilateral stenosis of 0-49%. Minimal carotid atherosclerosis bilaterally.  Duplex evaluation for stroke evaluation.  Has been on Plavix since.  Hypertension  Dyslipidemia  Obesity  Fibromyalgia  Osteoarthritis  Borderline diabetes mellitus      Allergies   Allergen Reactions    Propoxyphene Anaphylaxis    Atorvastatin Myalgia     Causes muscle problems    Estrogens Other (See Comments)     Elevates triglycerides    Diovan [Valsartan] Rash         Current Outpatient Medications:     albuterol sulfate  (90 Base) MCG/ACT inhaler, ProAir HFA  108 (90 Base) MCG/ACT Inhalation Aerosol Solution; Patient Sig: ProAir  (90 Base) MCG/ACT Inhalation Aerosol Solution ; 8; 0; 20-Feb-2014; Active, Disp: , Rfl:     ARIPiprazole (ABILIFY) 10 MG tablet, Take 1 tablet by mouth Daily., Disp: , Rfl: 2    clopidogrel (PLAVIX) 75 MG tablet, Take 1 tablet by mouth Daily., Disp: , Rfl:     cyanocobalamin 1000 MCG/ML injection, Every 30 (Thirty) Days., Disp: , Rfl:     Dapagliflozin Propanediol 10 MG tablet, Take 10 mg by mouth Daily., Disp: , Rfl:     desloratadine (CLARINEX) 5 MG tablet, Take 1 tablet by mouth Daily., Disp: , Rfl:     DULoxetine (CYMBALTA) 60 MG capsule, Take 1 capsule by mouth Daily., Disp: , Rfl:     ezetimibe (ZETIA) 10 MG tablet, Take 1 tablet by mouth Daily., Disp: , Rfl:     gabapentin (NEURONTIN) 600 MG tablet, Take 1 tablet by mouth 3 (Three) Times a Day. 2 tabs three times a day, Disp: , Rfl:     HYDROcodone-acetaminophen (NORCO)  MG per tablet, Take 1 tablet by mouth As Needed., Disp: , Rfl: 0    lisinopril (PRINIVIL,ZESTRIL) 10 MG tablet, Take 1 tablet by mouth Daily., Disp: , Rfl:     metFORMIN (GLUCOPHAGE) 1000 MG tablet, Take 1 tablet by mouth 2 (Two) Times a Day With Meals., Disp: , Rfl:     metoprolol tartrate (LOPRESSOR) 12.5 MG half tablet, Take 2 half tablet by mouth 2 (Two) Times a Day., Disp: , Rfl:     montelukast (SINGULAIR) 10 MG tablet, Take 1 tablet by mouth Every Night., Disp: , Rfl:     pantoprazole (PROTONIX) 40 MG EC tablet, Take 1 tablet by mouth Daily., Disp: , Rfl:     rOPINIRole (REQUIP) 0.5 MG tablet, Take 1 tablet by mouth every night at bedtime., Disp: , Rfl:     rosuvastatin (CRESTOR) 10 MG tablet, Take 1 tablet by mouth Daily., Disp: , Rfl:     ubrogepant (Ubrelvy) 100 MG tablet, Take 1 tablet by mouth 1 (One) Time As Needed (Migraine). Take at onset of headache - if symptoms persist or return, may repeat dose in 2 hours. Maximum: 200 mg per 24 hours, Disp: 16 tablet, Rfl: 11     History of Present  "Illness: Katelyn Wilson is a 64 y.o. female who is here today for annual evaluation for aortic aneurysm, mild valvular disease, hypertension and hyperlipidemia.  Patient did have a echocardiogram done after last visit which showed no progression of valvular heart disease.  It showed a normal aortic root but patient has not had a CT scan of her chest since 2022.  Blood pressure has been well-controlled.  Patient remains on Plavix per neurology after having a TIA.  Patient states that she is supposed to have follow-up with her PCP with blood work done soon.      The following portions of the patient's history were reviewed and updated as appropriate: allergies, current medications and problem list.     Pertinent positives as listed in the HPI.  All other systems reviewed are negative.            Vitals:    11/21/24 1301   BP: 110/62   BP Location: Right arm   Patient Position: Sitting   Pulse: 95   SpO2: 98%   Weight: 98.2 kg (216 lb 6.4 oz)   Height: 177.8 cm (70\")     Body mass index is 31.05 kg/m².    Physical Exam:  General: Alert and oriented.  Neck: Jugular venous pressure is within normal limits. Carotids have normal upstrokes without bruits.   Cardiovascular: Regular rate and rhythm  With 1/6 systolic ejection murmur.  Lungs: Clear, no rales or wheezes. Equal expansion is noted.   Extremities: No peripheral edema  Skin: Warm and dry.  Neurologic: Nonfocal.     Diagnostic data (reviewed with patient):  No recent blood work for evaluation                Assessment:  1. Aortic ectasia, thoracic    2. VHD (valvular heart disease)    3. Essential hypertension    4. Hyperlipidemia LDL goal <100             Plan:  Patient needs follow-up with her thoracic aneurysm.  Will put in a CTA of the chest.  This can be done at [facility] as that is most convenient for the patient.  Patient can remain on Plavix per neurology after having a TIA.  Patient is not on Plavix for any cardiac reason and if neurology " thought it was necessary patient could go down to an aspirin.  Remain on lisinopril 10 mg daily and metoprolol to tartrate 25 mg twice daily for hypertension and rate control.  Continue Zetia 10 mg daily and Crestor 10 mg daily.  She is post to get blood work with her PCP to check her cholesterol  Continue all other current medications.  F/up in 12 months, sooner if needed.      Aileen Mccracken PA-C

## 2024-12-09 ENCOUNTER — HOSPITAL ENCOUNTER (OUTPATIENT)
Dept: CT IMAGING | Facility: HOSPITAL | Age: 64
Discharge: HOME | End: 2024-12-09
Admitting: PHYSICIAN ASSISTANT
Payer: COMMERCIAL

## 2024-12-09 LAB — CREAT BLDA-MCNC: 0.7 MG/DL (ref 0.6–1.3)

## 2024-12-09 PROCEDURE — 25510000001 IOPAMIDOL 61 % SOLUTION: Performed by: PHYSICIAN ASSISTANT

## 2024-12-09 PROCEDURE — 71275 CT ANGIOGRAPHY CHEST: CPT

## 2024-12-09 PROCEDURE — 71275 CT ANGIOGRAPHY CHEST: CPT | Performed by: RADIOLOGY

## 2024-12-09 PROCEDURE — 82565 ASSAY OF CREATININE: CPT

## 2024-12-09 RX ORDER — IOPAMIDOL 612 MG/ML
100 INJECTION, SOLUTION INTRAVASCULAR
Status: COMPLETED | OUTPATIENT
Start: 2024-12-09 | End: 2024-12-09

## 2024-12-09 RX ADMIN — IOPAMIDOL 80 ML: 612 INJECTION, SOLUTION INTRAVENOUS at 13:37

## 2025-01-24 ENCOUNTER — SPECIALTY PHARMACY (OUTPATIENT)
Dept: GENERAL RADIOLOGY | Facility: HOSPITAL | Age: 65
End: 2025-01-24
Payer: COMMERCIAL

## 2025-01-24 RX ORDER — FOLIC ACID 1 MG/1
1 TABLET ORAL DAILY
COMMUNITY

## 2025-01-24 RX ORDER — HYDROXYCHLOROQUINE SULFATE 200 MG/1
400 TABLET, FILM COATED ORAL DAILY
COMMUNITY

## 2025-01-24 RX ORDER — ERGOCALCIFEROL 1.25 MG/1
50000 CAPSULE, LIQUID FILLED ORAL WEEKLY
COMMUNITY

## 2025-01-24 RX ORDER — METHOTREXATE 2.5 MG/1
10 TABLET ORAL WEEKLY
COMMUNITY

## 2025-01-24 NOTE — PROGRESS NOTES
Specialty Pharmacy Patient Management Program  Neurology Reassessment     Katelyn Wilson is a 64 y.o. female with Chronic Migraine seen by a Neurology provider and enrolled in the Neurology Patient Management program offered by [facility].  A follow-up outreach was conducted, including assessment of continued therapy appropriateness, medication adherence, and side effect incidence and management for Ubrelvy.     Changes to Insurance Coverage or Financial Support  No changes, pt still has [employer] + Ubrelvy copay card     Relevant Past Medical History and Comorbidities  Relevant medical history and concomitant health conditions were discussed with the patient. The patient's chart has been reviewed for relevant past medical history and comorbid health conditions and updated as necessary.   Past Medical History:   Diagnosis Date    Arthritis     Asthma     Ataxia     Bladder infection     Bronchitis     Calculus of distal right ureter     Cardiac murmur 06/26/2019    Chest pain     [facility] feb 2003-near normal coronaries, Cardiolite 2007-Exercised for 7 minutes and 22 seconds-no evidence of ischemia, LVEF 84%, ER presentation on 3/17/16 Dr. 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     Hyperlipidemia     Hypertension     Kidney stone     Low back pain     Memory loss 2011    Migraine 1975    Muscle weakness (generalized)     Neck pain     Obesity     Osteoarthritis     Osteoporosis     Peripheral neuropathy     Sleep apnea 2004    Stroke     MRI of brain 2001 revealing Microvascular changes and evidence of 3mm, left basal fanglia lacunar infarct with  initiation of Plavix    Syncope     neg Tilt Table 2011    Tendonitis     TIA (transient ischemic attack)     Ureteral stone     Vaginal infection      Social History     Socioeconomic History    Marital status:    Tobacco Use    Smoking status: Former     Current packs/day: 0.00     Average packs/day: 1.1 packs/day for 26.7 years (30.0 ttl pk-yrs)     Types: Cigarettes     Start date: 1973     Quit date: 1995     Years since quittin.0     Passive exposure: Current (friend: once monthly)    Smokeless tobacco: Never   Vaping Use    Vaping status: Never Used   Substance and Sexual Activity    Alcohol use: No    Drug use: No    Sexual activity: Yes     Partners: Male     Birth control/protection: Hysterectomy     Problem list reviewed by Phil Esposito, PharmD on 2025 at  9:08 AM    Hospitalizations and Urgent Care Since Last Assessment  ED Visits, Admissions, or Hospitalizations: None   Urgent Office Visits: None     Allergies  Known allergies and reactions were discussed with the patient. The patient's chart has been reviewed for allergy information and updated as necessary.   Allergies   Allergen Reactions    Propoxyphene Anaphylaxis    Atorvastatin Myalgia     Causes muscle problems    Estrogens Other (See Comments)     Elevates triglycerides    Diovan [Valsartan] Rash     Allergies reviewed by Phil Esposito, PharmD on 2025 at  8:57 AM    Relevant Laboratory Values  Common labs          2024    13:13   Common Labs   Creatinine 0.70        Lab Assessment  N/A.     Current Medication List  This medication list has been reviewed with the patient and evaluated for any interactions or necessary modifications/recommendations, and updated to include all prescription medications, OTC medications, and supplements the patient is currently taking.  This list reflects what is contained in the patient's profile, which has also been marked as reviewed to communicate to other  providers it is the most up to date version of the patient's current medication therapy.     Current Outpatient Medications:     ubrogepant (Ubrelvy) 100 MG tablet, Take 1 tablet by mouth 1 Time As Needed  at onset of headache - if symptoms return, repeat dose in 2 hours. Max: 2 tabs/24 hr, Disp: 16 tablet, Rfl: 11    albuterol sulfate  (90 Base) MCG/ACT inhaler, ProAir  (90 Base) MCG/ACT Inhalation Aerosol Solution; Patient Sig: ProAir  (90 Base) MCG/ACT Inhalation Aerosol Solution ; 8; 0; 20-Feb-2014; Active, Disp: , Rfl:     ARIPiprazole (ABILIFY) 10 MG tablet, Take 1 tablet by mouth Daily., Disp: , Rfl: 2    clopidogrel (PLAVIX) 75 MG tablet, Take 1 tablet by mouth Daily., Disp: , Rfl:     cyanocobalamin 1000 MCG/ML injection, Every 30 (Thirty) Days., Disp: , Rfl:     Dapagliflozin Propanediol 10 MG tablet, Take 10 mg by mouth Daily., Disp: , Rfl:     desloratadine (CLARINEX) 5 MG tablet, Take 1 tablet by mouth Daily., Disp: , Rfl:     DULoxetine (CYMBALTA) 60 MG capsule, Take 1 capsule by mouth Daily., Disp: , Rfl:     ezetimibe (ZETIA) 10 MG tablet, Take 1 tablet by mouth Daily., Disp: , Rfl:     folic acid (FOLVITE) 1 MG tablet, Take 1 tablet by mouth Daily., Disp: , Rfl:     gabapentin (NEURONTIN) 600 MG tablet, Take 1 tablet by mouth 3 (Three) Times a Day. 2 tabs three times a day, Disp: , Rfl:     HYDROcodone-acetaminophen (NORCO)  MG per tablet, Take 1 tablet by mouth As Needed., Disp: , Rfl: 0    hydroxychloroquine (PLAQUENIL) 200 MG tablet, Take 2 tablets by mouth Daily., Disp: , Rfl:     lisinopril (PRINIVIL,ZESTRIL) 10 MG tablet, Take 1 tablet by mouth Daily., Disp: , Rfl:     metFORMIN (GLUCOPHAGE) 1000 MG tablet, Take 1 tablet by mouth 2 (Two) Times a Day With Meals., Disp: , Rfl:     methotrexate 2.5 MG tablet, Take 4 tablets by mouth 1 (One) Time Per Week., Disp: , Rfl:     metoprolol tartrate (LOPRESSOR) 12.5 MG half tablet, Take 2 half tablet by mouth 2 (Two) Times a  Day., Disp: , Rfl:     montelukast (SINGULAIR) 10 MG tablet, Take 1 tablet by mouth Every Night., Disp: , Rfl:     pantoprazole (PROTONIX) 40 MG EC tablet, Take 1 tablet by mouth Daily., Disp: , Rfl:     rOPINIRole (REQUIP) 0.5 MG tablet, Take 1 tablet by mouth every night at bedtime., Disp: , Rfl:     rosuvastatin (CRESTOR) 10 MG tablet, Take 1 tablet by mouth Daily., Disp: , Rfl:     vitamin D (ERGOCALCIFEROL) 1.25 MG (99746 UT) capsule capsule, Take 1 capsule by mouth 1 (One) Time Per Week., Disp: , Rfl:     Medicines reviewed by Phil Esposito, PharmD on 1/24/2025 at  9:08 AM  Medicines reviewed by Phil Esposito, PharmD on 1/24/2025 at  9:08 AM    Drug Interactions  None     Adverse Drug Reactions  Medication tolerability: Tolerating with no to minimal ADRs          Medication plan: Continue therapy with normal follow-up  Plan for ADR Management: Not required      Adherence, Self-Administration, and Current Therapy Problems  Adherence related patient's specialty therapy was discussed with the patient. The Adherence segment of this outreach has been reviewed and updated.   Adherence Questions  Linked Medication(s) Assessed: Ubrogepant (UBRELVY)  On average, how many doses/injections does the patient miss per month?: 0  What are the identified reasons for non-adherence or missed doses? : no problems identified  What is the estimated medication adherence level?: % (Ubrelvy-PRN)  Based on the patient/caregiver response and refill history, does this patient require an MTP to track adherence improvements?: no    Additional Barriers to Patient Self-Administration: None  Methods for Supporting Patient Self-Administration: Not required.    Recently Close Medication Therapy Problems  No medication therapy recommendations to display  Open Medication Therapy Problems  No medication therapy recommendations to display     Goals of Therapy  Goals related to the patient's specialty therapy was discussed with the  patient. The Patient Goals segment of this outreach has been reviewed and updated.    Goals Addressed Today        Specialty Pharmacy General Goal      Decrease frequency, severity and duration of migraine attacks.     On Average, Reduce:   Frequency of migraines to 7 per month.   Symptom severity by 50% within 1 hour of taking acute therapy.   Duration of migraines to several hours    Baseline Values/Notes on Enrollment  Frequency: 15/30 headache days/month  Symptom Severity: Moderate to severe  Duration: Several hours    Date of Reassessment Notes on Progress Toward Above Goals   1/24/25 Overall, she is doing well on Ubrelvy as needed for treatment of headaches. Pt denies any side effects. She experienced about 1-3 migraine days over the last two months, which is an improvement for her. She did not have any questions or concerns with Ubrelvy at this time and I recommend she continue on the current drug regimen. I advised her to call the office if her headaches increase in frequency or severity. Pt acknowledged. On the quality of life improvement scale, patient appreciates our pharmacy services and feels our help has improved their quality of life. The patient gave a score of 8. On track.                                                         Quality of Life Assessment   Quality of Life related to the patient's enrollment in the patient management program and services provided was discussed with the patient. The QOL segment of this outreach has been reviewed and updated.   Quality of Life Improvement Scale: 8-Moderately better    Reassessment Plan & Follow-Up  Medication Therapy Changes: Continue Ubrelvy 100mg- take 1 tab po prn onset of migraine (may repeat dose x1 in 2 hours if needed, max 2 tabs/day)  Related Plans, Therapy Recommendations, or Issues to Be Addressed: Overall, she is doing well on Ubrelvy as needed for treatment of headaches. Pt denies any side effects. She experienced about 1-3 migraine days  over the last two months, which is an improvement for her. She did not have any questions or concerns with Ubrelvy at this time and I recommend she continue on the current drug regimen. I advised her to call the office if her headaches increase in frequency or severity. Pt acknowledged. On the quality of life improvement scale, patient appreciates our pharmacy services and feels our help has improved their quality of life. The patient gave a score of 8. On track. We will continue to fill through [facility] [place] and ship via [employer] next day air in the future. Pt acknowledged.   Pharmacist to perform regular reassessments no more than (6) months from the previous assessment.  Care Coordinator to set up future refill outreaches, coordinate prescription delivery, and escalate clinical questions to pharmacist.     Attestation  Therapeutic appropriateness: Appropriate  I attest the patient was actively involved in and has agreed to the above plan of care. If the prescribed therapy is at any point deemed not appropriate based on the current or future assessments, a consultation will be initiated with the patient's specialty care provider to determine the best course of action. The revised plan of therapy will be documented along with any additional patient education provided. Discussed aforementioned material with patient via telemedicine.    Phil Esposito, PharmD  Clinic Specialty Pharmacist, Neurology  1/24/2025  09:13 EST

## 2025-03-11 ENCOUNTER — SPECIALTY PHARMACY (OUTPATIENT)
Dept: NEUROLOGY | Facility: CLINIC | Age: 65
End: 2025-03-11
Payer: COMMERCIAL

## 2025-05-12 ENCOUNTER — SPECIALTY PHARMACY (OUTPATIENT)
Dept: NEUROLOGY | Facility: CLINIC | Age: 65
End: 2025-05-12
Payer: COMMERCIAL

## 2025-06-16 ENCOUNTER — SPECIALTY PHARMACY (OUTPATIENT)
Dept: GENERAL RADIOLOGY | Facility: HOSPITAL | Age: 65
End: 2025-06-16
Payer: COMMERCIAL

## 2025-06-16 NOTE — PROGRESS NOTES
Specialty Pharmacy Patient Management Program  Neurology Reassessment     Katelyn Wilson is a 64 y.o. female with Chronic Migraine seen by a Neurology provider and enrolled in the Neurology Patient Management program offered by [facility].  A follow-up outreach was conducted, including assessment of continued therapy appropriateness, medication adherence, and side effect incidence and management for Ubrelvy.     Changes to Insurance Coverage or Financial Support  No changes, pt still has [employer] + Ubrelvy copay card     Relevant Past Medical History and Comorbidities  Relevant medical history and concomitant health conditions were discussed with the patient. The patient's chart has been reviewed for relevant past medical history and comorbid health conditions and updated as necessary.   Past Medical History:   Diagnosis Date    Arthritis     Asthma     Ataxia     Bladder infection     Bronchitis     Calculus of distal right ureter     Cardiac murmur 06/26/2019    Chest pain     [facility] feb 2003-near normal coronaries, Cardiolite 2007-Exercised for 7 minutes and 22 seconds-no evidence of ischemia, LVEF 84%, ER presentation on 3/17/16 Dr. 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     Hyperlipidemia     Hypertension     Kidney stone     Low back pain     Memory loss 2011    Migraine 1975    Muscle weakness (generalized)     Neck pain     Obesity     Osteoarthritis     Osteoporosis     Peripheral neuropathy     Sleep apnea 2004    Stroke     MRI of brain 2001 revealing Microvascular changes and evidence of 3mm, left basal fanglia lacunar infarct with  initiation of Plavix    Syncope     neg Tilt Table 2011    Tendonitis     TIA (transient ischemic attack)     Ureteral stone     Vaginal infection      Social History     Socioeconomic History    Marital status:    Tobacco Use    Smoking status: Former     Current packs/day: 0.00     Average packs/day: 1.1 packs/day for 26.7 years (30.0 ttl pk-yrs)     Types: Cigarettes     Start date: 1973     Quit date: 1995     Years since quittin.4     Passive exposure: Current (friend: once monthly)    Smokeless tobacco: Never   Vaping Use    Vaping status: Never Used   Substance and Sexual Activity    Alcohol use: No    Drug use: No    Sexual activity: Yes     Partners: Male     Birth control/protection: Hysterectomy     Problem list reviewed by Phil Esposito, William on 2025 at  2:13 PM    Hospitalizations and Urgent Care Since Last Assessment  ED Visits, Admissions, or Hospitalizations: None   Urgent Office Visits: None     Allergies  Known allergies and reactions were discussed with the patient. The patient's chart has been reviewed for allergy information and updated as necessary.   Allergies   Allergen Reactions    Propoxyphene Anaphylaxis    Atorvastatin Myalgia     Causes muscle problems    Estrogens Other (See Comments)     Elevates triglycerides    Diovan [Valsartan] Rash     Allergies reviewed by Phil Esposito, PharmD on 2025 at  2:12 PM    Relevant Laboratory Values  Common labs          2024    13:13   Common Labs   Creatinine 0.70        Lab Assessment  The above labs have been reviewed. No dose adjustments are needed for the specialty medication(s) based on the labs.     Current Medication List  This medication list has been reviewed with the patient and evaluated for any interactions or necessary modifications/recommendations, and updated to include all prescription medications, OTC medications, and supplements the patient is currently taking.  This list  reflects what is contained in the patient's profile, which has also been marked as reviewed to communicate to other providers it is the most up to date version of the patient's current medication therapy.     Current Outpatient Medications:     albuterol sulfate  (90 Base) MCG/ACT inhaler, ProAir  (90 Base) MCG/ACT Inhalation Aerosol Solution; Patient Sig: ProAir  (90 Base) MCG/ACT Inhalation Aerosol Solution ; 8; 0; 20-Feb-2014; Active, Disp: , Rfl:     ARIPiprazole (ABILIFY) 10 MG tablet, Take 1 tablet by mouth Daily., Disp: , Rfl: 2    clopidogrel (PLAVIX) 75 MG tablet, Take 1 tablet by mouth Daily., Disp: , Rfl:     cyanocobalamin 1000 MCG/ML injection, Every 30 (Thirty) Days., Disp: , Rfl:     Dapagliflozin Propanediol 10 MG tablet, Take 10 mg by mouth Daily., Disp: , Rfl:     desloratadine (CLARINEX) 5 MG tablet, Take 1 tablet by mouth Daily., Disp: , Rfl:     DULoxetine (CYMBALTA) 60 MG capsule, Take 1 capsule by mouth Daily., Disp: , Rfl:     ezetimibe (ZETIA) 10 MG tablet, Take 1 tablet by mouth Daily., Disp: , Rfl:     folic acid (FOLVITE) 1 MG tablet, Take 1 tablet by mouth Daily., Disp: , Rfl:     gabapentin (NEURONTIN) 600 MG tablet, Take 1 tablet by mouth 3 (Three) Times a Day. 2 tabs three times a day, Disp: , Rfl:     HYDROcodone-acetaminophen (NORCO)  MG per tablet, Take 1 tablet by mouth As Needed., Disp: , Rfl: 0    hydroxychloroquine (PLAQUENIL) 200 MG tablet, Take 2 tablets by mouth Daily., Disp: , Rfl:     lisinopril (PRINIVIL,ZESTRIL) 10 MG tablet, Take 1 tablet by mouth Daily., Disp: , Rfl:     metFORMIN (GLUCOPHAGE) 1000 MG tablet, Take 1 tablet by mouth 2 (Two) Times a Day With Meals., Disp: , Rfl:     methotrexate 2.5 MG tablet, Take 4 tablets by mouth 1 (One) Time Per Week., Disp: , Rfl:     metoprolol tartrate (LOPRESSOR) 12.5 MG half tablet, Take 2 half tablet by mouth 2 (Two) Times a Day., Disp: , Rfl:     montelukast (SINGULAIR) 10 MG tablet, Take 1 tablet  by mouth Every Night., Disp: , Rfl:     pantoprazole (PROTONIX) 40 MG EC tablet, Take 1 tablet by mouth Daily., Disp: , Rfl:     rOPINIRole (REQUIP) 0.5 MG tablet, Take 1 tablet by mouth every night at bedtime., Disp: , Rfl:     rosuvastatin (CRESTOR) 10 MG tablet, Take 1 tablet by mouth Daily., Disp: , Rfl:     ubrogepant (Ubrelvy) 100 MG tablet, Take 1 tablet by mouth 1 Time As Needed  at onset of headache - if symptoms return, repeat dose in 2 hours. Max: 2 tabs/24 hr, Disp: 16 tablet, Rfl: 11    vitamin D (ERGOCALCIFEROL) 1.25 MG (04379 UT) capsule capsule, Take 1 capsule by mouth 1 (One) Time Per Week., Disp: , Rfl:     Medicines reviewed by Phil Esposito, PharmD on 6/16/2025 at  2:13 PM    Drug Interactions  None     Adverse Drug Reactions  Medication tolerability: Tolerating with no to minimal ADRs          Medication plan: Continue therapy with normal follow-up  Plan for ADR Management: Call 911 or go to ED      Adherence, Self-Administration, and Current Therapy Problems  Adherence related patient's specialty therapy was discussed with the patient. The Adherence segment of this outreach has been reviewed and updated.   Adherence Questions  Linked Medication(s) Assessed: Ubrogepant (UBRELVY)  On average, how many doses/injections does the patient miss per month?: 0  What are the identified reasons for non-adherence or missed doses? : no problems identified  What is the estimated medication adherence level?: % (Ubrelvy- PRN)  Based on the patient/caregiver response and refill history, does this patient require an MTP to track adherence improvements?: no    Additional Barriers to Patient Self-Administration: None  Methods for Supporting Patient Self-Administration: Not required.    Recently Close Medication Therapy Problems  No medication therapy recommendations to display  Open Medication Therapy Problems  No medication therapy recommendations to display     Goals of Therapy  Goals related to the  patient's specialty therapy was discussed with the patient. The Patient Goals segment of this outreach has been reviewed and updated.    Goals Addressed Today        Specialty Pharmacy General Goal      Decrease frequency, severity and duration of migraine attacks.     On Average, Reduce:   Frequency of migraines to 7 per month.   Symptom severity by 50% within 1 hour of taking acute therapy.   Duration of migraines to several hours    Baseline Values/Notes on Enrollment  Frequency: 15/30 headache days/month  Symptom Severity: Moderate to severe  Duration: Several hours    Date of Reassessment Notes on Progress Toward Above Goals   1/24/25 Overall, she is doing well on Ubrelvy as needed for treatment of headaches. Pt denies any side effects. She experienced about 1-3 migraine days over the last two months, which is an improvement for her. She did not have any questions or concerns with Ubrelvy at this time and I recommend she continue on the current drug regimen. I advised her to call the office if her headaches increase in frequency or severity. Pt acknowledged. On the quality of life improvement scale, patient appreciates our pharmacy services and feels our help has improved their quality of life. The patient gave a score of 8. On track.    6/16/25 Overall, she is doing well on Ubrelvy as needed for treatment of headaches. Pt denies any side effects. She experienced about 1-3 migraine days over the last several months, which is an improvement for her. The patient stated that her other health conditions are under better control and she believes this has helped decrease the overall amount of migraines days. She did not have any questions or concerns with Ubrelvy at this time and I recommend she continue on the current drug regimen. I advised her to call the office if her headaches increase in frequency or severity. Pt acknowledged. On the quality of life improvement scale, patient appreciates our pharmacy services  and feels our help has improved their quality of life. The patient gave a score of 9. On track.                                                     Quality of Life Assessment   Quality of Life related to the patient's enrollment in the patient management program and services provided was discussed with the patient. The QOL segment of this outreach has been reviewed and updated.   Quality of Life Improvement Scale: 9-A good deal better    Reassessment Plan & Follow-Up  Medication Therapy Changes: Ubrelvy 100mg- take 1 tab po prn onset of migraine (may repeat dose x1 in 2 hours if needed, max 2 tabs/day)  Related Plans, Therapy Recommendations, or Issues to Be Addressed: Overall, she is doing well on Ubrelvy as needed for treatment of headaches. Pt denies any side effects. She experienced about 1-3 migraine days over the last several months, which is an improvement for her. The patient stated that her other health conditions are under better control and she believes this has helped decrease the overall amount of migraines days. She did not have any questions or concerns with Ubrelvy at this time and I recommend she continue on the current drug regimen. I advised her to call the office if her headaches increase in frequency or severity. Pt acknowledged. On the quality of life improvement scale, patient appreciates our pharmacy services and feels our help has improved their quality of life. The patient gave a score of 9. On track. We will continue to fill Ubrelvy through [facility] and ship via [employer] next day air in the future.   Pharmacist to perform regular reassessments no more than (6) months from the previous assessment.  Care Coordinator to set up future refill outreaches, coordinate prescription delivery, and escalate clinical questions to pharmacist.     Attestation  Therapeutic appropriateness: Appropriate  I attest the patient was actively involved in and has agreed to the above plan of care. If the prescribed  therapy is at any point deemed not appropriate based on the current or future assessments, a consultation will be initiated with the patient's specialty care provider to determine the best course of action. The revised plan of therapy will be documented along with any additional patient education provided. Discussed aforementioned material with patient by phone.    Phil Esposito, PharmD  Clinic Specialty Pharmacist, Neurology  6/16/2025  14:14 EDT

## 2025-08-19 ENCOUNTER — TELEPHONE (OUTPATIENT)
Dept: SURGERY | Facility: CLINIC | Age: 65
End: 2025-08-19
Payer: MEDICARE

## 2025-08-20 DIAGNOSIS — Z80.0 FAMILY HISTORY OF COLON CANCER: Primary | ICD-10-CM

## 2025-08-20 RX ORDER — POLYETHYLENE GLYCOL 3350 17 G/17G
POWDER, FOR SOLUTION ORAL
Qty: 238 G | Refills: 0 | Status: SHIPPED | OUTPATIENT
Start: 2025-08-20

## 2025-08-20 RX ORDER — MAGNESIUM CARB/ALUMINUM HYDROX 105-160MG
296 TABLET,CHEWABLE ORAL ONCE
Qty: 296 ML | Refills: 0 | Status: SHIPPED | OUTPATIENT
Start: 2025-08-20 | End: 2025-08-20

## 2025-08-20 RX ORDER — BISACODYL 5 MG
TABLET, DELAYED RELEASE (ENTERIC COATED) ORAL
Qty: 4 TABLET | Refills: 0 | Status: SHIPPED | OUTPATIENT
Start: 2025-08-20

## 2025-08-27 ENCOUNTER — PREP FOR SURGERY (OUTPATIENT)
Dept: OTHER | Facility: HOSPITAL | Age: 65
End: 2025-08-27
Payer: MEDICARE

## 2025-08-27 DIAGNOSIS — Z12.11 SCREENING FOR COLON CANCER: Primary | ICD-10-CM

## (undated) DEVICE — FORCEP BX 2.2 MMX240 CM CHANNEL SERRATED RADIAL JAW 4